# Patient Record
Sex: FEMALE | Race: WHITE | Employment: OTHER | URBAN - NONMETROPOLITAN AREA
[De-identification: names, ages, dates, MRNs, and addresses within clinical notes are randomized per-mention and may not be internally consistent; named-entity substitution may affect disease eponyms.]

---

## 2021-07-23 ENCOUNTER — HOSPITAL ENCOUNTER (INPATIENT)
Age: 71
LOS: 20 days | Discharge: NURSING, REHAB OR LONG-TERM CARE | DRG: 057 | End: 2021-08-12
Attending: PSYCHIATRY & NEUROLOGY | Admitting: PSYCHIATRY & NEUROLOGY
Payer: MEDICARE

## 2021-07-23 DIAGNOSIS — G24.01 TARDIVE DYSKINESIA: ICD-10-CM

## 2021-07-23 DIAGNOSIS — F41.1 GAD (GENERALIZED ANXIETY DISORDER): Primary | ICD-10-CM

## 2021-07-23 PROBLEM — G30.9 SEVERE MAJOR NEUROCOGNITIVE DISORDER DUE TO ALZHEIMER'S DISEASE WITH BEHAVIORAL DISTURBANCE (HCC): Status: ACTIVE | Noted: 2021-07-23

## 2021-07-23 PROBLEM — N32.81 OVERACTIVE BLADDER: Chronic | Status: ACTIVE | Noted: 2021-07-23

## 2021-07-23 PROBLEM — E87.6 HYPOKALEMIA: Status: ACTIVE | Noted: 2021-07-23

## 2021-07-23 PROBLEM — I10 ESSENTIAL HYPERTENSION: Chronic | Status: ACTIVE | Noted: 2021-07-23

## 2021-07-23 PROBLEM — E78.49 OTHER HYPERLIPIDEMIA: Chronic | Status: ACTIVE | Noted: 2021-07-23

## 2021-07-23 PROBLEM — F02.C18 SEVERE MAJOR NEUROCOGNITIVE DISORDER DUE TO ALZHEIMER'S DISEASE WITH BEHAVIORAL DISTURBANCE (HCC): Status: ACTIVE | Noted: 2021-07-23

## 2021-07-23 PROBLEM — E03.4 HYPOTHYROIDISM DUE TO ACQUIRED ATROPHY OF THYROID: Chronic | Status: ACTIVE | Noted: 2021-07-23

## 2021-07-23 PROCEDURE — 86592 SYPHILIS TEST NON-TREP QUAL: CPT

## 2021-07-23 PROCEDURE — 6370000000 HC RX 637 (ALT 250 FOR IP): Performed by: PHYSICIAN ASSISTANT

## 2021-07-23 PROCEDURE — 1240000000 HC EMOTIONAL WELLNESS R&B

## 2021-07-23 PROCEDURE — 84439 ASSAY OF FREE THYROXINE: CPT

## 2021-07-23 PROCEDURE — 6370000000 HC RX 637 (ALT 250 FOR IP): Performed by: PSYCHIATRY & NEUROLOGY

## 2021-07-23 PROCEDURE — 84443 ASSAY THYROID STIM HORMONE: CPT

## 2021-07-23 PROCEDURE — 36415 COLL VENOUS BLD VENIPUNCTURE: CPT

## 2021-07-23 RX ORDER — LORAZEPAM 2 MG/ML
1 INJECTION INTRAMUSCULAR
Status: ACTIVE | OUTPATIENT
Start: 2021-07-23 | End: 2021-07-23

## 2021-07-23 RX ORDER — OXYBUTYNIN CHLORIDE 5 MG/1
5 TABLET ORAL DAILY
Status: DISCONTINUED | OUTPATIENT
Start: 2021-07-24 | End: 2021-07-26 | Stop reason: ALTCHOICE

## 2021-07-23 RX ORDER — LEVOTHYROXINE SODIUM 0.03 MG/1
25 TABLET ORAL DAILY
Status: DISCONTINUED | OUTPATIENT
Start: 2021-07-24 | End: 2021-08-12 | Stop reason: HOSPADM

## 2021-07-23 RX ORDER — ACETAMINOPHEN 325 MG/1
650 TABLET ORAL EVERY 4 HOURS PRN
Status: DISCONTINUED | OUTPATIENT
Start: 2021-07-23 | End: 2021-08-12 | Stop reason: HOSPADM

## 2021-07-23 RX ORDER — AMLODIPINE BESYLATE 5 MG/1
5 TABLET ORAL DAILY
COMMUNITY
Start: 2021-07-23

## 2021-07-23 RX ORDER — SIMVASTATIN 40 MG
40 TABLET ORAL DAILY
COMMUNITY
Start: 2021-07-23

## 2021-07-23 RX ORDER — AMLODIPINE BESYLATE 5 MG/1
5 TABLET ORAL DAILY
Status: DISCONTINUED | OUTPATIENT
Start: 2021-07-24 | End: 2021-08-12 | Stop reason: HOSPADM

## 2021-07-23 RX ORDER — POTASSIUM CHLORIDE 750 MG/1
10 TABLET, FILM COATED, EXTENDED RELEASE ORAL DAILY
COMMUNITY
Start: 2021-07-23

## 2021-07-23 RX ORDER — LOSARTAN POTASSIUM 100 MG/1
100 TABLET ORAL ONCE
Status: COMPLETED | OUTPATIENT
Start: 2021-07-23 | End: 2021-07-23

## 2021-07-23 RX ORDER — ATORVASTATIN CALCIUM 20 MG/1
20 TABLET, FILM COATED ORAL DAILY
Status: DISCONTINUED | OUTPATIENT
Start: 2021-07-24 | End: 2021-08-12 | Stop reason: HOSPADM

## 2021-07-23 RX ORDER — VENLAFAXINE HYDROCHLORIDE 75 MG/1
75 CAPSULE, EXTENDED RELEASE ORAL DAILY
Status: ON HOLD | COMMUNITY
Start: 2021-07-23 | End: 2021-08-11 | Stop reason: HOSPADM

## 2021-07-23 RX ORDER — LOSARTAN POTASSIUM 100 MG/1
100 TABLET ORAL DAILY
Status: DISCONTINUED | OUTPATIENT
Start: 2021-07-24 | End: 2021-08-12 | Stop reason: HOSPADM

## 2021-07-23 RX ORDER — AMLODIPINE BESYLATE 5 MG/1
5 TABLET ORAL ONCE
Status: COMPLETED | OUTPATIENT
Start: 2021-07-23 | End: 2021-07-23

## 2021-07-23 RX ORDER — TRAZODONE HYDROCHLORIDE 50 MG/1
50 TABLET ORAL NIGHTLY PRN
Status: DISCONTINUED | OUTPATIENT
Start: 2021-07-23 | End: 2021-08-12 | Stop reason: HOSPADM

## 2021-07-23 RX ORDER — FENOFIBRATE 160 MG/1
160 TABLET ORAL DAILY
Status: ON HOLD | COMMUNITY
Start: 2021-07-23 | End: 2021-08-11 | Stop reason: HOSPADM

## 2021-07-23 RX ORDER — LORAZEPAM 1 MG/1
1 TABLET ORAL ONCE
Status: COMPLETED | OUTPATIENT
Start: 2021-07-23 | End: 2021-07-23

## 2021-07-23 RX ORDER — BENZTROPINE MESYLATE 1 MG/ML
2 INJECTION INTRAMUSCULAR; INTRAVENOUS 2 TIMES DAILY PRN
Status: DISCONTINUED | OUTPATIENT
Start: 2021-07-23 | End: 2021-08-12 | Stop reason: HOSPADM

## 2021-07-23 RX ORDER — HALOPERIDOL 5 MG/ML
5 INJECTION INTRAMUSCULAR EVERY 4 HOURS PRN
Status: DISCONTINUED | OUTPATIENT
Start: 2021-07-23 | End: 2021-08-12 | Stop reason: HOSPADM

## 2021-07-23 RX ORDER — OXYBUTYNIN CHLORIDE 5 MG/1
5 TABLET ORAL DAILY
COMMUNITY
Start: 2021-07-23

## 2021-07-23 RX ORDER — ACETAMINOPHEN 500 MG
500 TABLET ORAL EVERY 4 HOURS PRN
Status: DISCONTINUED | OUTPATIENT
Start: 2021-07-23 | End: 2021-08-12 | Stop reason: HOSPADM

## 2021-07-23 RX ORDER — HALOPERIDOL 5 MG
5 TABLET ORAL EVERY 4 HOURS PRN
Status: DISCONTINUED | OUTPATIENT
Start: 2021-07-23 | End: 2021-08-12 | Stop reason: HOSPADM

## 2021-07-23 RX ORDER — LEVOTHYROXINE SODIUM 0.03 MG/1
25 TABLET ORAL DAILY
COMMUNITY
Start: 2021-07-23

## 2021-07-23 RX ORDER — LORAZEPAM 2 MG/ML
1 INJECTION INTRAMUSCULAR EVERY 4 HOURS PRN
Status: DISCONTINUED | OUTPATIENT
Start: 2021-07-23 | End: 2021-08-12 | Stop reason: HOSPADM

## 2021-07-23 RX ORDER — CLONAZEPAM 1 MG/1
1 TABLET ORAL ONCE
Status: COMPLETED | OUTPATIENT
Start: 2021-07-23 | End: 2021-07-23

## 2021-07-23 RX ORDER — POTASSIUM CHLORIDE 750 MG/1
10 TABLET, EXTENDED RELEASE ORAL DAILY
Status: DISCONTINUED | OUTPATIENT
Start: 2021-07-24 | End: 2021-08-12 | Stop reason: HOSPADM

## 2021-07-23 RX ORDER — LOSARTAN POTASSIUM 100 MG/1
100 TABLET ORAL DAILY
COMMUNITY
Start: 2021-07-23

## 2021-07-23 RX ORDER — LORAZEPAM 1 MG/1
1 TABLET ORAL EVERY 4 HOURS PRN
Status: DISCONTINUED | OUTPATIENT
Start: 2021-07-23 | End: 2021-08-12 | Stop reason: HOSPADM

## 2021-07-23 RX ORDER — POLYETHYLENE GLYCOL 3350 17 G/17G
17 POWDER, FOR SOLUTION ORAL DAILY PRN
Status: DISCONTINUED | OUTPATIENT
Start: 2021-07-23 | End: 2021-08-12 | Stop reason: HOSPADM

## 2021-07-23 RX ADMIN — LORAZEPAM 1 MG: 1 TABLET ORAL at 22:14

## 2021-07-23 RX ADMIN — CLONAZEPAM 1 MG: 1 TABLET ORAL at 22:14

## 2021-07-23 RX ADMIN — LOSARTAN POTASSIUM 100 MG: 100 TABLET, FILM COATED ORAL at 22:14

## 2021-07-23 RX ADMIN — AMLODIPINE BESYLATE 5 MG: 5 TABLET ORAL at 22:14

## 2021-07-24 LAB
ALBUMIN SERPL-MCNC: 3.3 GM/DL (ref 3.4–5)
ALP BLD-CCNC: 65 IU/L (ref 40–129)
ALT SERPL-CCNC: 11 U/L (ref 10–40)
ANION GAP SERPL CALCULATED.3IONS-SCNC: 8 MMOL/L (ref 4–16)
AST SERPL-CCNC: 27 IU/L (ref 15–37)
BASOPHILS ABSOLUTE: 0 K/CU MM
BASOPHILS RELATIVE PERCENT: 0.3 % (ref 0–1)
BILIRUB SERPL-MCNC: 0.8 MG/DL (ref 0–1)
BUN BLDV-MCNC: 15 MG/DL (ref 6–23)
CALCIUM SERPL-MCNC: 9.6 MG/DL (ref 8.3–10.6)
CHLORIDE BLD-SCNC: 101 MMOL/L (ref 99–110)
CO2: 30 MMOL/L (ref 21–32)
CREAT SERPL-MCNC: 0.8 MG/DL (ref 0.6–1.1)
DIFFERENTIAL TYPE: ABNORMAL
EOSINOPHILS ABSOLUTE: 0.1 K/CU MM
EOSINOPHILS RELATIVE PERCENT: 1.6 % (ref 0–3)
ESTIMATED AVERAGE GLUCOSE: 103 MG/DL
GFR AFRICAN AMERICAN: >60 ML/MIN/1.73M2
GFR NON-AFRICAN AMERICAN: >60 ML/MIN/1.73M2
GLUCOSE BLD-MCNC: 79 MG/DL (ref 70–99)
HBA1C MFR BLD: 5.2 % (ref 4.2–6.3)
HCT VFR BLD CALC: 45.6 % (ref 37–47)
HEMOGLOBIN: 14.2 GM/DL (ref 12.5–16)
IMMATURE NEUTROPHIL %: 0.2 % (ref 0–0.43)
LYMPHOCYTES ABSOLUTE: 1.2 K/CU MM
LYMPHOCYTES RELATIVE PERCENT: 19.5 % (ref 24–44)
MCH RBC QN AUTO: 29 PG (ref 27–31)
MCHC RBC AUTO-ENTMCNC: 31.1 % (ref 32–36)
MCV RBC AUTO: 93.3 FL (ref 78–100)
MONOCYTES ABSOLUTE: 0.6 K/CU MM
MONOCYTES RELATIVE PERCENT: 8.9 % (ref 0–4)
PDW BLD-RTO: 13.1 % (ref 11.7–14.9)
PLATELET # BLD: 160 K/CU MM (ref 140–440)
PMV BLD AUTO: 12.6 FL (ref 7.5–11.1)
POTASSIUM SERPL-SCNC: 3.9 MMOL/L (ref 3.5–5.1)
RBC # BLD: 4.89 M/CU MM (ref 4.2–5.4)
SEGMENTED NEUTROPHILS ABSOLUTE COUNT: 4.4 K/CU MM
SEGMENTED NEUTROPHILS RELATIVE PERCENT: 69.5 % (ref 36–66)
SODIUM BLD-SCNC: 139 MMOL/L (ref 135–145)
T4 FREE: 1.2 NG/DL (ref 0.9–1.8)
TOTAL IMMATURE NEUTOROPHIL: 0.01 K/CU MM
TOTAL PROTEIN: 6.6 GM/DL (ref 6.4–8.2)
TSH HIGH SENSITIVITY: 0.91 UIU/ML (ref 0.27–4.2)
WBC # BLD: 6.4 K/CU MM (ref 4–10.5)

## 2021-07-24 PROCEDURE — 80053 COMPREHEN METABOLIC PANEL: CPT

## 2021-07-24 PROCEDURE — 83036 HEMOGLOBIN GLYCOSYLATED A1C: CPT

## 2021-07-24 PROCEDURE — 6370000000 HC RX 637 (ALT 250 FOR IP): Performed by: PHYSICIAN ASSISTANT

## 2021-07-24 PROCEDURE — 85025 COMPLETE CBC W/AUTO DIFF WBC: CPT

## 2021-07-24 PROCEDURE — 6370000000 HC RX 637 (ALT 250 FOR IP): Performed by: PSYCHIATRY & NEUROLOGY

## 2021-07-24 PROCEDURE — 99232 SBSQ HOSP IP/OBS MODERATE 35: CPT | Performed by: PSYCHIATRY & NEUROLOGY

## 2021-07-24 PROCEDURE — 6360000002 HC RX W HCPCS: Performed by: PSYCHIATRY & NEUROLOGY

## 2021-07-24 PROCEDURE — 93005 ELECTROCARDIOGRAM TRACING: CPT | Performed by: PSYCHIATRY & NEUROLOGY

## 2021-07-24 PROCEDURE — 36415 COLL VENOUS BLD VENIPUNCTURE: CPT

## 2021-07-24 PROCEDURE — 1240000000 HC EMOTIONAL WELLNESS R&B

## 2021-07-24 RX ORDER — DIVALPROEX SODIUM 500 MG/1
500 TABLET, EXTENDED RELEASE ORAL DAILY
Status: DISCONTINUED | OUTPATIENT
Start: 2021-07-24 | End: 2021-07-24

## 2021-07-24 RX ORDER — DIVALPROEX SODIUM 125 MG/1
500 CAPSULE, COATED PELLETS ORAL DAILY
Status: DISCONTINUED | OUTPATIENT
Start: 2021-07-25 | End: 2021-08-08

## 2021-07-24 RX ORDER — BENZTROPINE MESYLATE 1 MG/1
1 TABLET ORAL 2 TIMES DAILY
Status: DISCONTINUED | OUTPATIENT
Start: 2021-07-24 | End: 2021-08-12 | Stop reason: HOSPADM

## 2021-07-24 RX ADMIN — LOSARTAN POTASSIUM 100 MG: 100 TABLET, FILM COATED ORAL at 10:44

## 2021-07-24 RX ADMIN — TRAZODONE HYDROCHLORIDE 50 MG: 50 TABLET ORAL at 20:52

## 2021-07-24 RX ADMIN — CHOLECALCIFEROL TAB 125 MCG (5000 UNIT) 5000 UNITS: 125 TAB at 10:44

## 2021-07-24 RX ADMIN — ATORVASTATIN CALCIUM 20 MG: 20 TABLET, FILM COATED ORAL at 10:44

## 2021-07-24 RX ADMIN — DIVALPROEX SODIUM 500 MG: 500 TABLET, EXTENDED RELEASE ORAL at 16:07

## 2021-07-24 RX ADMIN — BENZTROPINE MESYLATE 1 MG: 1 TABLET ORAL at 20:52

## 2021-07-24 RX ADMIN — HALOPERIDOL 5 MG: 5 TABLET ORAL at 21:56

## 2021-07-24 RX ADMIN — OXYBUTYNIN CHLORIDE 5 MG: 5 TABLET ORAL at 10:44

## 2021-07-24 RX ADMIN — LEVOTHYROXINE SODIUM 25 MCG: 25 TABLET ORAL at 10:43

## 2021-07-24 RX ADMIN — AMLODIPINE BESYLATE 5 MG: 5 TABLET ORAL at 10:44

## 2021-07-24 RX ADMIN — BENZTROPINE MESYLATE 1 MG: 1 TABLET ORAL at 16:09

## 2021-07-24 RX ADMIN — POTASSIUM CHLORIDE 10 MEQ: 10 TABLET, EXTENDED RELEASE ORAL at 10:41

## 2021-07-24 NOTE — GROUP NOTE
Group Therapy Note    Date: 7/24/2021    Group Start Time: 3486  Group End Time: 1578    Number of Participants: 3/4    Type: Exercise/Recreation Group    Group Topic/Objective: Chair Exercises    Notes: Pt declined participation despite encouragement from nurse.     Discipline Responsible: Music Therapist-Board Certified    Electronically signed by PRETTY Delgado on 7/24/2021 at 4:06 PM

## 2021-07-24 NOTE — CONSULTS
Hospitalist Consult Note      Name:  Rufino Batista /Age/Sex: 1950  (80 y.o. female)   MRN & CSN:  [id number] & [id number] Admission Date/Time: 2021  6:11 PM   Location:  1048/1048-01 PCP: [facility] [place], operated by [facility] Day: 1    Assessment and Plan:   Rufino Batista is a 80 y.o.  female  who presents with hallucinations and aggressive behavior  Hospitalist Team consulted for: Medical Management of the patient's    Assessment  Principal Problem:    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 ([facility] [address]  Active Problems:    Hypokalemia    Hypothyroidism due to acquired atrophy of thyroid    Essential hypertension    Overactive bladder    Other hyperlipidemia  Resolved Problems:    * No resolved hospital problems. *        Plan:  1. Neurocognitive disorder/Alzheimer's disease: Managed by the psychiatrist  2. Hypokalemia: Given oral replacement in the hospital prior to transfer, will get periodic BMP and continue with oral replacement of 10 mEq daily  3. Hypothyroidism: Continue with medication  4. Essential hypertension: Patient had mild elevated hypertension upon admission, she was given her daily medication immediately, and we will continue with oral daily medication  5. Overactive bladder: Continue with daily medication  6. Hyperlipidemia: Continue with daily medication        Diet ADULT DIET;  Regular   DVT Prophylaxis [] Lovenox, []  Heparin, [] SCDs, [x] Ambulation   GI Prophylaxis [] PPI,  [] H2 Blocker,  [] Carafate,  [] Diet/Tube Feeds   Code Status Full Code   Disposition Patient requires continued admission due to hallucinations, aggressive behavior and neurocognitive decline/disturbance     History of Present Illness: 4 elements, Location/Symptom, Timing/Onset, Context/Setting, Quality, Duration, Modifying Factors, Severity     Chief Complaint: 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 [facility])  Maddie Macias is a 80 y.o.  female  who presents with acute behavior changes. The patient lives with a sister, they usually take care of her, according to the nurse who took report from the hospital for which she was transferred she has not taken any of her medicines for the past 2 weeks, and she has been hallucinating. She was admitted to [facility] as an altered mental status, she was aggressive at that time, she was eventually cleared and sent to our senior behavioral unit where she resides at this time. Review of Systems: (10 or more )   Review of Systems   Unable to perform ROS: Psychiatric disorder       Patient has altered mental status and psychiatric illness and there is no ability to obtain a review of systems or confirmed family history    No family history on file. Social History     Tobacco Use    Smoking status: Not on file   Substance Use Topics    Alcohol use: Not on file    Drug use: Not on file       (2-9 systems for level 4, 10 or more for level 5)  Objective:   No intake or output data in the 24 hours ending 07/23/21 2253     Vitals: There were no vitals filed for this visit. Physical Exam:    Physical Exam  Vitals and nursing note reviewed. Constitutional:       Appearance: Normal appearance. She is well-developed. She is not ill-appearing or diaphoretic. HENT:      Head: Normocephalic and atraumatic. Right Ear: External ear normal.      Left Ear: External ear normal.      Nose: Nose normal.   Eyes:      General:         Right eye: No discharge. Left eye: No discharge. Cardiovascular:      Rate and Rhythm: Normal rate and regular rhythm. Heart sounds: Normal heart sounds. No murmur heard. No friction rub. No gallop. Pulmonary:      Effort: Pulmonary effort is normal. No respiratory distress. Breath sounds: Normal breath sounds. No stridor. No wheezing or rales. Chest:      Chest wall: No tenderness.    Musculoskeletal: General: Normal range of motion. Cervical back: Normal range of motion and neck supple. Skin:     General: Skin is warm and dry. Coloration: Skin is not pale. Neurological:      General: No focal deficit present. Mental Status: She is alert and oriented to person, place, and time. GCS: GCS eye subscore is 4. GCS verbal subscore is 5. GCS motor subscore is 6. Motor: No weakness. Comments: Patient has tongue protrusions, and jerking movements consistent with tardive dyskinesia   Psychiatric:         Speech: She is noncommunicative. Behavior: Behavior is cooperative. Cognition and Memory: Cognition is impaired.       Comments: Patient responds yes or no to questions, occasionally says hello yes or hell no, is unable to provide any meaningful history             Medications:   Medications:    [START ON 7/24/2021] amLODIPine  5 mg Oral Daily    [START ON 7/24/2021] levothyroxine  25 mcg Oral Daily    [START ON 7/24/2021] losartan  100 mg Oral Daily    [START ON 7/24/2021] oxybutynin  5 mg Oral Daily    [START ON 7/24/2021] potassium chloride  10 mEq Oral Daily    [START ON 7/24/2021] vitamin D3  5,000 Units Oral Daily    [START ON 7/24/2021] atorvastatin  20 mg Oral Daily      Infusions:   PRN Meds: LORazepam, 1 mg, Once PRN  acetaminophen, 650 mg, Q4H PRN  acetaminophen, 500 mg, Q4H PRN  acetaminophen, 650 mg, Q4H PRN  polyethylene glycol, 17 g, Daily PRN  benztropine mesylate, 2 mg, BID PRN  LORazepam, 1 mg, Q4H PRN   Or  LORazepam, 1 mg, Q4H PRN  haloperidol, 5 mg, Q4H PRN   Or  haloperidol lactate, 5 mg, Q4H PRN  traZODone, 50 mg, Nightly PRN          Electronically signed by Tonia Odonnell PA-C on 7/23/2021 at 10:53 PM

## 2021-07-24 NOTE — H&P
Initial Psychiatric History and Physical    [facility]  [id number]  7/23/2021 07/24/21    ID: Patient is a 80 yrs y.o. female    CC: I am depressed      HPI: Pt is a 81 yo  female who presents for exacerbation of Neurocognitive D/O alzehimers type with behavioral disturbance. Pt noted recent exacarbation of mood and confusion with agitationf. Pt noted she currently feels safe and comfortable on the unit. Pt was in agreement with treatment team.  Pt was polite and cordial during the interview process. Pt noted she is doing [place] today. \"  Pt noted she is sleeping \"okay. [place] last night. \"  Pt noted her apptetite is reduced. Pt rated her depresssion a \"5,\" on a scale of zero to ten with ten being the worst and zero being none. Pt rated her anxiety a \"5,\" on the same scale. Pt noted passive thoughts to harm herself at this time. Pt denied any thoughts to harm anyone else. Pt denied any auditory or visiual hallucintations. 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      Alcohol: denies any current  Street drugs: denies any current  Tobacco: denies any current  Caffeine: 2-3 per day      Past Psychiatric History:   See note above    Family Psychiatric History:   See note above  Family Psychiatric History:   No family history on file. Allergies:   Allergies   Allergen Reactions    Penicillins Hives    Sulfa Antibiotics Other (See Comments)     Reaction unknown        OBJECTIVE  Vital Signs:  Vitals:    07/24/21 0727   BP: 123/79   Pulse: 76   Resp: 15   Temp: 98.1 °F (36.7 °C)   SpO2: 91%       Labs:  Recent Results (from the past 48 hour(s))   TSH without Reflex    Collection Time: 07/23/21 10:30 PM   Result Value Ref Range    TSH, High Sensitivity 0.910 0.270 - 4.20 uIu/ml   T4, free    Collection Time: 07/23/21 10:30 PM   Result Value Ref Range    T4 Free 1.20 0.9 - 1.8 NG/DL   EKG 12 lead    Collection Time: 07/24/21  5:14 AM   Result Value Ref Range    Ventricular Rate 65 BPM    Atrial Rate 65 BPM    P-R Interval 178 ms    QRS Duration 84 ms    Q-T Interval 452 ms    QTc Calculation (Bazett) 470 ms    P Axis 68 degrees    R Axis 23 degrees    T Axis 79 degrees    Diagnosis       Normal sinus rhythm  Normal ECG  No previous ECGs available     Comprehensive Metabolic Panel w/ Reflex to MG    Collection Time: 07/24/21  5:50 AM   Result Value Ref Range    Sodium 139 135 - 145 MMOL/L    Potassium 3.9 3.5 - 5.1 MMOL/L    Chloride 101 99 - 110 mMol/L    CO2 30 21 - 32 MMOL/L    BUN 15 6 - 23 MG/DL    CREATININE 0.8 0.6 - 1.1 MG/DL    Glucose 79 70 - 99 MG/DL    Calcium 9.6 8.3 - 10.6 MG/DL    Albumin 3.3 (L) 3.4 - 5.0 GM/DL    Total Protein 6.6 6.4 - 8.2 GM/DL    Total Bilirubin 0.8 0.0 - 1.0 MG/DL    ALT 11 10 - 40 U/L    AST 27 15 - 37 IU/L    Alkaline Phosphatase 65 40 - 129 IU/L    GFR Non-African American >60 >60 mL/min/1.73m2    GFR African American >60 >60 mL/min/1.73m2    Anion Gap 8 4 - 16   CBC auto differential    Collection Time: 07/24/21  5:50 AM   Result Value Ref Range    WBC 6.4 4.0 - 10.5 K/CU MM    RBC 4.89 4.2 - 5.4 M/CU MM    Hemoglobin 14.2 12.5 - 16.0 GM/DL    Hematocrit 45.6 37 - 47 %    MCV 93.3 78 - 100 FL    MCH 29.0 27 - 31 PG    MCHC 31.1 (L) 32.0 - 36.0 %    RDW 13.1 11.7 - 14.9 %    Platelets 120 800 - 439 K/CU MM    MPV 12.6 (H) 7.5 - 11.1 FL    Differential Type AUTOMATED DIFFERENTIAL     Segs Relative 69.5 (H) 36 - 66 %    Lymphocytes % 19.5 (L) 24 - 44 %    Monocytes % 8.9 (H) 0 - 4 %    Eosinophils % 1.6 0 - 3 %    Basophils % 0.3 0 - 1 %    Segs Absolute 4.4 K/CU MM    Lymphocytes Absolute 1.2 K/CU MM    Monocytes Absolute 0.6 K/CU MM    Eosinophils Absolute 0.1 K/CU MM    Basophils Absolute 0.0 K/CU MM    Immature Neutrophil % 0.2 0 - 0.43 %    Total Immature Neutrophil 0.01 K/CU MM   Hemoglobin A1c    Collection Time: 07/24/21  5:50 AM   Result Value Ref Range    Hemoglobin A1C 5.2 4.2 - 6.3 %    eAG 103 mg/dL       Review of Systems:  Reports of no current cardiovascular, respiratory, gastrointestinal, genitourinary, integumentary, neurological, muscuoskeletal, or immunological symptoms today. PSYCHIATRIC: See HPI above. Neurologic examination:  Mental status: The patient is alert, attentive, and oriented. Speech is clear and fluent with good repetition, comprehension, and naming. She recalls 3/3 objects at 5 minutes. ranial nerves:  CN II: Visual fields are full to confrontation. Fundoscopic exam is normal with sharp discs and no vascular changes. Venous pulsations are present bilaterally. Pupils are 4 mm and briskly reactive to light. Visual acuity is 20/20 bilaterally. CN III, IV, VI: At primary gaze, there is no eye deviation. CN V: Facial sensation is intact to pinprick in all 3 divisions bilaterally. Corneal responses are intact. CN VII: Face is symmetric with normal eye closure and smile. CN VII: Hearing is normal to rubbing fingers    CN IX, X: Palate elevates symmetrically. Phonation is normal.    CN XI: Head turning and shoulder shrug are intact    CN XII: Tongue is midline at times with TD based movements    Motor:  Chorea form type movements    Reflexes:  Reflexes are 2+ and symmetric at the biceps, triceps, knees, and ankles. Plantar responses are flexor. Sensory:  Light touch, pinprick, position sense, and vibration sense are intact in fingers and toes. Coordination:  Rapid alternating movements and fine finger movements are intact. There is no dysmetria on finger-to-nose and heel-knee-shin. There are no abnormal or extraneous movements.  Romberg is absent. Gait/Stance:  Posture is normal. Gait is steady with normal steps, base, arm swing, and turning. Heel and toe walking are normal. Tandem gait is normal when the patient closes one of her eyes. PSYCHIATRIC EXAMINATION / MENTAL STATUS EXAM    CONSTITUTIONAL:    Vitals:   Vitals:    07/24/21 0727   BP: 123/79   Pulse: 76   Resp: 15   Temp: 98.1 °F (36.7 °C)   SpO2: 91%      General appearance: [x] appears age, []  appears older than stated age,               [x]  adequately dressed and groomed, [] disheveled,               [x]  in no acute distress, [] appears mildly distressed, [] other           MUSCULOSKELETAL:   Gait:   [] normal, [] antalgic, [] unsteady, [] gait not evaluated   Station:             [] erect, [] sitting, [] recumbent, [] other        Strength/tone:  [x] muscle strength and tone appear consistent with age and                                        condition     [] atrophy      [] abnormal movements  PSYCHIATRIC:    Appearance: appears stated age. alert and oriented to person only. no acute distress. Adequate grooming and hygeine. Good eye contact. No prominent physical abnormalities. Attitude: Manner is cooperative and pleasant  Motor: Noted psychomotor agitation with chorea form movements and TD  Speech:limited due to TD; normal rate, volume, tone & amount. Language: intact understanding and production  Mood:nervious  Affect: mood congruent  Thought Production: Spontaneous. Thought Form: Noted tangentiality and circumstantiality with flight of ideas and loosening of associations. Thought Content/Perceptions: No NETTIE, no AVH, no delusion  Insight:questionable  Judgment: questonable  Memory: Immediate, recent, and remote appear impaired, though not formally tested.   Attention: maintained throughout interview  Fund of knowledge: Average  Gait/Balance: WNL/WNL           Impression:   Neurocognitive D/O Alzheimer type with behavioral disturbance  TD      Problem List:   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 ([facility] [address]   Tardive Dyskineasa   Jasper movements    Plan:  1. Admit to [facility]  2. Consult hospitalist to evaluate and treat medical conditions  3. Adjust psychotropic medications to target symptoms  4. Occupational Therapy, Physical Therapy, Group Psychotherapy as tolerated  5. Reviewed treatment plan with patient including medication risks, benefits, side effects. Obtained informed consent for treatment. 6. Anticipated length of stay 10-12 days  7. I certify that inpatient psychiatric hospital admission is medically necessary for treatment, which can be expected to improve the patient's condition, and/or for diagnostic study. 8. Psychiatric management:medication initiation and titration, group and individual therapy, safe and theraputic environment. 9. Status of problem/condition: ?Improving  10. Medical co-morbidities: [facility], appreciate assistance  11. Legal Status:Pending  12. The treatment team reviewed with the patient the diagnosis and treatment recommendations to include the risks, benefits, and side effects of chosen medications. 13. The patient verbalized understanding and agreed with the treatment regimen as outlined above. 14. The patient was encouraged to participate in groups. 15. Medical records, Labs, Diagnotic tests reviewed  16. q15 min safety checks for safety  17. Interval History. 18. Review current labs  19. Continue current medications- Start ingrezza for sever TD, [place] pharmacy \"refuse\" to help will coordinate when pharmacy opens tomorrow  20. Supportive Therapy Provided  21. Pt had an opportunity to ask questions and address concerns  22. Pt encouraged to continue therapy group or individual.  23. Pt was in agreement with treatment plan.   24. The risks benefits and side effects of medications were discussed with the patient, including alternatives and no treatment.           Electronically signed by Pranay Bob DO on 7/24/2021 at 1:49 PM

## 2021-07-24 NOTE — PROGRESS NOTES
Pt's sisters here to visit. Per sisters,  pt has never been , no children. Sisters would like for her to return home if possible. If not, [facility] in [facility] would be where they would like her placed. Pt's physician rounds there and she is comfortable with him.

## 2021-07-24 NOTE — PROGRESS NOTES
Pt's sisters came to visit today. Pt's sisters state Pt began crying intermittently in 1984, began tongue thrusting in January 2019, and began having hand movements within the last \"few months\". Pt's sisters state family practitioner was informed, but not concerned. Dr. Evan Fleming informed.

## 2021-07-24 NOTE — PROGRESS NOTES
Furman Schlatter arrived on the unit by stretcher at 2210 7/23/2021. She was making involuntary movements with her arms, face and tongue. She is not speaking very much but will answer questions with \"hell no\" or just nod her head. Her speech is hard to understand and she seems to be doing sign language with her hands. She has been refusing her medications at home so this RN crushed her pills and put them in pudding. Furman Schlatter did not want to eat the pudding but I told her it was the doctors orders and if she wanted to go home she needed to do what the doctor said. She ate all the pudding after that. She has been cooperative and not shown any aggressive behavior since she got to the unit. A clean gown and depends were put on her before bed and she slept through the night. Furman Schlatter woke up around 6am and had been incontinent of bowel and urine. She was crying in bed. We showered her, changed bed linens and shampooed her hair. Her hair had mats that could not be brushed out. This RN asked if she could cut them out and Furman Schlatter said \"cut them! \" She seemed to enjoy having her hair brushed and cut. She was extremely cooperative throughout the process and was not crying and upset anymore. Her speech was easier to understand this morning. This RN believes she oriented x1 or 2 is and is trying to communicate.

## 2021-07-24 NOTE — PROGRESS NOTES
Pt refusing to leave room to come down for dinner. Pt states that \"they\" are down there. This RN assured pt that the same pts that have been here all day are the ones eating but pt continues to refuse. Pt sitting in chair in room. Will continue to monitor.

## 2021-07-24 NOTE — PROGRESS NOTES
Unable to complete assessment d/t pt's disease progression. Pt has nonsensical and unintelligible speech.     Electronically signed by PRETTY Vega on 7/24/2021 at 11:32 AM

## 2021-07-24 NOTE — PROGRESS NOTES
Behavioral Services  Medicare Certification Upon Admission    I certify that this patient's inpatient psychiatric hospital admission is medically necessary for:    [x] (1) Treatment which could reasonably be expected to improve this patient's condition,       [x] (2) Or for diagnostic study;     AND     [x](2) The inpatient psychiatric services are provided while the individual is under the care of a physician and are included in the individualized plan of care.     Estimated length of stay/service 7 days    Plan for post-hospital care out pt follow up mental health    Electronically signed by Shantelle Brothers DO on 7/23/2021 at 10:25 PM

## 2021-07-24 NOTE — PROGRESS NOTES
Pt has constant tongue thrusting to the point where most of Pt's speech is unintelligible. This able to definitively deny SI/HI. Pt sometimes answers \"yes\" and shakes head up and down when asked about hallucinations and sometimes answers \"no\" while shaking head from side to side when asked about hallucinations. Pt noted throughout day to be talking loudly while sitting alone, though Pt's speech unintelligible. Pt would progressively get louder and would eventually start banging fists on the table. Pt very responsive to being asked to stop banging fist and would then go back to quietly talking to herself again. Pt often attempts to whisper and mouth words though it is impossible to ascertain what Pt is trying to convey - Pt continues to do so despite being asked to speak up as Pt cannot be understood. Pt ate 100% of breakfast and lunch. Pt refusing to eat dinner. Pt in room at times talking to self and yelling to self. Pt states, \"There are different people out there than before, \" and refuses to come out for dinner. Despite reassurance and encouragement, Pt remains in room. Pt threw empty cup across room one of the times Pt's room was visited encouraging Pt to come to dinner. Pt given space to self-calm. Pt currently in room talking quietly to self. Pt safety maintained. Continue to monitor.

## 2021-07-25 LAB
EKG ATRIAL RATE: 65 BPM
EKG DIAGNOSIS: NORMAL
EKG P AXIS: 68 DEGREES
EKG P-R INTERVAL: 178 MS
EKG Q-T INTERVAL: 452 MS
EKG QRS DURATION: 84 MS
EKG QTC CALCULATION (BAZETT): 470 MS
EKG R AXIS: 23 DEGREES
EKG T AXIS: 79 DEGREES
EKG VENTRICULAR RATE: 65 BPM

## 2021-07-25 PROCEDURE — 99232 SBSQ HOSP IP/OBS MODERATE 35: CPT | Performed by: PSYCHIATRY & NEUROLOGY

## 2021-07-25 PROCEDURE — 36415 COLL VENOUS BLD VENIPUNCTURE: CPT

## 2021-07-25 PROCEDURE — 6370000000 HC RX 637 (ALT 250 FOR IP): Performed by: PHYSICIAN ASSISTANT

## 2021-07-25 PROCEDURE — 86592 SYPHILIS TEST NON-TREP QUAL: CPT

## 2021-07-25 PROCEDURE — 6370000000 HC RX 637 (ALT 250 FOR IP): Performed by: PSYCHIATRY & NEUROLOGY

## 2021-07-25 PROCEDURE — 1240000000 HC EMOTIONAL WELLNESS R&B

## 2021-07-25 PROCEDURE — 93010 ELECTROCARDIOGRAM REPORT: CPT | Performed by: INTERNAL MEDICINE

## 2021-07-25 RX ORDER — OLANZAPINE 5 MG/1
5 TABLET ORAL NIGHTLY
Status: DISCONTINUED | OUTPATIENT
Start: 2021-07-25 | End: 2021-08-04

## 2021-07-25 RX ADMIN — LOSARTAN POTASSIUM 100 MG: 100 TABLET, FILM COATED ORAL at 07:47

## 2021-07-25 RX ADMIN — TRAZODONE HYDROCHLORIDE 50 MG: 50 TABLET ORAL at 20:43

## 2021-07-25 RX ADMIN — CHOLECALCIFEROL TAB 125 MCG (5000 UNIT) 5000 UNITS: 125 TAB at 07:45

## 2021-07-25 RX ADMIN — AMLODIPINE BESYLATE 5 MG: 5 TABLET ORAL at 07:47

## 2021-07-25 RX ADMIN — POTASSIUM CHLORIDE 10 MEQ: 10 TABLET, EXTENDED RELEASE ORAL at 07:45

## 2021-07-25 RX ADMIN — OXYBUTYNIN CHLORIDE 5 MG: 5 TABLET ORAL at 07:45

## 2021-07-25 RX ADMIN — BENZTROPINE MESYLATE 1 MG: 1 TABLET ORAL at 07:45

## 2021-07-25 RX ADMIN — LEVOTHYROXINE SODIUM 25 MCG: 25 TABLET ORAL at 07:44

## 2021-07-25 RX ADMIN — OLANZAPINE 5 MG: 5 TABLET, FILM COATED ORAL at 20:43

## 2021-07-25 RX ADMIN — ATORVASTATIN CALCIUM 20 MG: 20 TABLET, FILM COATED ORAL at 07:44

## 2021-07-25 RX ADMIN — DIVALPROEX SODIUM 500 MG: 125 CAPSULE ORAL at 07:44

## 2021-07-25 RX ADMIN — BENZTROPINE MESYLATE 1 MG: 1 TABLET ORAL at 20:42

## 2021-07-25 ASSESSMENT — PAIN SCALES - GENERAL
PAINLEVEL_OUTOF10: 0

## 2021-07-25 NOTE — GROUP NOTE
Group Therapy Note    Date: 7/25/2021    Group Start Time: 0830  Group End Time: 0900    Number of Participants: 3/5    Type: Morning Goals Group/ Community Meeting    Group Topic/Objective: Set Goal For The Day and to review Unit Rules and Regulations. Patient's Goal: Unintelligible    Notes:  Pt's vocalizations were largely unintelligible. Pt responded vocally to every question asked, however, only one vocalization was understood. When asked if pt was feeling depressed, pt vocalized \"oh hell no. \" Pt was unable to rate on a scale.      Depression (0-10): See above    Anxiety (0-10): Unintelligible     Irritability/Aggitation (0-10): Unintelligible     Status After Intervention:  Unchanged    Participation Level: Pt vocalized in response to questions    Participation Quality: Unintelligible    Speech:  Unintelligible     Thought Process/Content: Unable to report d/t unintelligible speech    Affective Functioning: Flat    Mood: Flat    Level of consciousness:  Alert    Response to Learning: Pt did respond vocally to all questions asked    Endings: None Reported    Modes of Intervention: Education, Support and Socialization    Discipline Responsible: Music Therapist-Board Certified    Electronically signed by PRETTY Lara on 7/25/2021 at 9:02 AM

## 2021-07-25 NOTE — PROGRESS NOTES
Pt appears calmer this shift as compared to yesterday's day shift. Pt noted to be intermittently talking quietly to self sitting in DR. Pt remains difficult to understand. Occasionally Pt will speak clearly. Pt eating 100% of meals. Pt denies SI/HI/AH/CH/VH. Pt receptive to encouragement to stay in milieu and participate. Pt had one brief episode of yelling out and and crying. Pt stated, \"Someone is trying to kill me. \" When asked who Pt believes is trying to kill Pt, Pt responded, \"My dad  on . \" Pt then began giving staff the middle finger. Pt currently chatting quietly to self in DR. Pt safety maintained. Continue to monitor.

## 2021-07-25 NOTE — PLAN OF CARE
Problem: Skin Integrity:  Goal: Will show no infection signs and symptoms  Description: Will show no infection signs and symptoms  7/24/2021 2212 by Meliza Aburto RN  Outcome: Ongoing  7/24/2021 1739 by Taisha Collazo RN  Outcome: Ongoing  Goal: Absence of new skin breakdown  Description: Absence of new skin breakdown  7/24/2021 2212 by Meliza Aburto RN  Outcome: Ongoing  7/24/2021 1739 by Taisha Collazo RN  Outcome: Ongoing     Problem: Altered Mood, Depressive Behavior:  Goal: Absence of self-harm  Description: Absence of self-harm  7/24/2021 2212 by Meliza Aburto RN  Outcome: Ongoing  7/24/2021 1739 by Taisha Collazo RN  Outcome: Ongoing  Goal: Patient specific goal  Description: Patient specific goal  7/24/2021 2212 by Meliza Aburto RN  Outcome: Ongoing  7/24/2021 1739 by Taisha Collazo RN  Outcome: Ongoing     Problem: Altered Mood, Deterioration in Function:  Goal: Ability to perform activities of daily living will improve  Description: Ability to perform activities of daily living will improve  7/24/2021 2212 by Meliza Aburto RN  Outcome: Ongoing  7/24/2021 1739 by Taisha Collazo RN  Outcome: Ongoing  Goal: Able to verbalize reality based thinking  Description: Able to verbalize reality based thinking  7/24/2021 2212 by Meliza Aburto RN  Outcome: Ongoing  7/24/2021 1739 by Taisha Collazo RN  Outcome: Ongoing  Goal: Skin appearance normal  Description: Skin appearance normal  7/24/2021 2212 by Meliza Aburto RN  Outcome: Ongoing  7/24/2021 1739 by Taisha Collaoz RN  Outcome: Ongoing  Goal: Maintenance of adequate nutrition will improve  Description: Maintenance of adequate nutrition will improve  7/24/2021 2212 by Meliza Aburto RN  Outcome: Ongoing  7/24/2021 1739 by Taisha Collazo RN  Outcome: Ongoing  Goal: Ability to tolerate increased activity will improve  Description: Ability to tolerate increased activity will improve  7/24/2021 2212 by Meliza Aburto RN  Outcome: Ongoing  7/24/2021 1739 by Antonio Méndez RN  Outcome: Ongoing  Goal: Participates in care planning  Description: Participates in care planning  7/24/2021 2212 by Soledad Eisenberg RN  Outcome: Ongoing  7/24/2021 1739 by Antonio Méndez RN  Outcome: Ongoing  Goal: Patient specific goal  Description: Patient specific goal  7/24/2021 2212 by Soledad Eisenberg RN  Outcome: Ongoing  7/24/2021 1739 by Antonio Méndez RN  Outcome: Ongoing     Problem: Falls - Risk of:  Goal: Will remain free from falls  Description: Will remain free from falls  7/24/2021 2212 by Soledad Eisenberg RN  Outcome: Ongoing  7/24/2021 1739 by Antonio Méndez RN  Outcome: Ongoing  Goal: Absence of physical injury  Description: Absence of physical injury  7/24/2021 2212 by Soledad Eisenberg RN  Outcome: Ongoing  7/24/2021 1739 by Antonio Méndez RN  Outcome: Ongoing     Problem: Confusion - Acute:  Goal: Absence of continued neurological deterioration signs and symptoms  Description: Absence of continued neurological deterioration signs and symptoms  7/24/2021 2212 by Soledad Eisenberg RN  Outcome: Ongoing  7/24/2021 1739 by Antonio Méndez RN  Outcome: Ongoing  Goal: Mental status will be restored to baseline  Description: Mental status will be restored to baseline  7/24/2021 2212 by Soledad Eisenberg RN  Outcome: Ongoing  7/24/2021 1739 by Antonio Méndez RN  Outcome: Ongoing     Problem: Discharge Planning:  Goal: Ability to perform activities of daily living will improve  Description: Ability to perform activities of daily living will improve  7/24/2021 2212 by Soledad Eisenberg RN  Outcome: Ongoing  7/24/2021 1739 by Antonio Méndez RN  Outcome: Ongoing  Goal: Participates in care planning  Description: Participates in care planning  7/24/2021 2212 by Soledad Eisenberg RN  Outcome: Ongoing  7/24/2021 1739 by Antonio Méndez RN  Outcome: Ongoing     Problem: Injury - Risk of, Physical Injury:  Goal: Will remain free from falls  Description: Will remain free from falls  7/24/2021 2212 by Soledad Eisenberg RN  Outcome: Ongoing  7/24/2021 1739 by Taisha Collazo RN  Outcome: Ongoing  Goal: Absence of physical injury  Description: Absence of physical injury  7/24/2021 2212 by Meliza Aburto RN  Outcome: Ongoing  7/24/2021 1739 by Taisha Collazo RN  Outcome: Ongoing     Problem: Mood - Altered:  Goal: Mood stable  Description: Mood stable  7/24/2021 2212 by Meliza Aburto RN  Outcome: Ongoing  7/24/2021 1739 by Taisha Collazo RN  Outcome: Ongoing  Goal: Absence of abusive behavior  Description: Absence of abusive behavior  7/24/2021 2212 by Meliza Aburto RN  Outcome: Ongoing  7/24/2021 1739 by Taisha Collazo RN  Outcome: Ongoing  Goal: Verbalizations of feeling emotionally comfortable while being cared for will increase  Description: Verbalizations of feeling emotionally comfortable while being cared for will increase  7/24/2021 2212 by Meliza Aburto RN  Outcome: Ongoing  7/24/2021 1739 by Taisha Collazo RN  Outcome: Ongoing     Problem: Psychomotor Activity - Altered:  Goal: Absence of psychomotor disturbance signs and symptoms  Description: Absence of psychomotor disturbance signs and symptoms  7/24/2021 2212 by Meliza Aburto RN  Outcome: Ongoing  7/24/2021 1739 by Taisha Collazo RN  Outcome: Ongoing     Problem: Sensory Perception - Impaired:  Goal: Demonstrations of improved sensory functioning will increase  Description: Demonstrations of improved sensory functioning will increase  7/24/2021 2212 by Meliza Aburto RN  Outcome: Ongoing  7/24/2021 1739 by Taisha Collazo RN  Outcome: Ongoing  Goal: Decrease in sensory misperception frequency  Description: Decrease in sensory misperception frequency  7/24/2021 2212 by Meliza Aburto RN  Outcome: Ongoing  7/24/2021 1739 by Taisha Collazo RN  Outcome: Ongoing  Goal: Able to refrain from responding to false sensory perceptions  Description: Able to refrain from responding to false sensory perceptions  7/24/2021 2212 by Meliza Aburto RN  Outcome: Ongoing  7/24/2021 1739

## 2021-07-25 NOTE — GROUP NOTE
Group Therapy Note    Date: 2021    Group Start Time:   Group End Time:     Number of Participants:     Notes: Pt was present during group. When pt was asked a question, pt vocalized unintelligibly. Pt ceased vocalizations during ~50% of live music provided by the [facility]. In between songs, pt began to shout. MT-BC asked her what was wrong. Pt vocalized \"someone is trying to kill me,\" and pt became tearful. MT-BC asked her who was trying to hurt her, and pt shouted \"my dad  on !!!\" and stuck her middle finger on both hands up and waved her arms around for ~30 seconds. Then pt was quiet in response to next song. Music Genre(s) Used: [place] [employer]    Status After Intervention:  Unchanged    Participation Level:  Active Listener    Participation Quality: Vocalizations were all unintelligible     Speech:  Unintelligible    Thought Process/Content: Pt's vocalizations were all unintelligible     Affective Functioning: Labile    Mood: Labile    Level of consciousness:  Alert    Response to Learning: unintelligible vocalizations    Endings: None Reported    Modes of Intervention: Support, Socialization and Music    Discipline Responsible: Music Therapist-Board Certified    Electronically signed by PRETTY Valero on 2021 at 3:53 PM

## 2021-07-25 NOTE — PROGRESS NOTES
Psychiatric Progress Note    Osman Mena  [id number]  07/25/21    CHIEF COMPLAINT: I get upset    HPI: Osman Mena is a 81 yo  female who presents for exacerbation of Neurocognitive D/O alzehimers type with behavioral disturbance. Pt noted recent exacarbation of mood and confusion with agitationf. Pt noted she currently feels safe and comfortable on the unit. Pt was in agreement with treatment team.  Pt was polite and cordial during the interview process. 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 \"  Pt noted she is sleeping \"okay. Fawn Krystyna last night. \"  Pt noted her apptetite is reduced. Pt rated her depresssion a \"5,\" on a scale of zero to ten with ten being the worst and zero being none. Pt rated her anxiety a \"5,\" on the same scale. Pt noted passive thoughts to harm herself at this time. Pt denied any thoughts to harm anyone else.   Pt denied any auditory or visiual hallucintations.           Allergies   Allergen Reactions    Penicillins Hives    Sulfa Antibiotics Other (See Comments)     Reaction unknown       Medications Prior to Admission: amLODIPine (NORVASC) 5 MG tablet, Take 5 mg by mouth daily  vitamin D3 (CHOLECALCIFEROL) 125 MCG (5000 UT) TABS tablet, Take 50,000 Units by mouth daily  fenofibrate (TRIGLIDE) 160 MG tablet, Take 160 mg by mouth daily  levothyroxine (SYNTHROID) 25 MCG tablet, Take 25 mcg by mouth daily  losartan (COZAAR) 100 MG tablet, Take 100 mg by mouth daily  oxybutynin (DITROPAN) 5 MG tablet, Take 5 mg by mouth daily  potassium chloride (KLOR-CON) 10 MEQ extended release tablet, Take 10 mEq by mouth daily  simvastatin (ZOCOR) 40 MG tablet, Take 40 mg by mouth daily  venlafaxine (EFFEXOR XR) 75 MG extended release capsule, Take 75 mg by mouth daily    Past Medical History:   Diagnosis Date    Essential hypertension     Hyperlipidemia         Patient Active Problem List Diagnosis    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 ([address]    Hypothyroidism due to acquired atrophy of thyroid    Essential hypertension    Overactive bladder    Hypokalemia    Other hyperlipidemia       Review of Systems    OBJECTIVE  Vital Signs:  Vitals:    07/25/21 0740   BP: 130/78   Pulse: 60   Resp: 20   Temp: 97.7 °F (36.5 °C)   SpO2: 98%       Labs:  Recent Results (from the past 48 hour(s))   TSH without Reflex    Collection Time: 07/23/21 10:30 PM   Result Value Ref Range    TSH, High Sensitivity 0.910 0.270 - 4.20 uIu/ml   T4, free    Collection Time: 07/23/21 10:30 PM   Result Value Ref Range    T4 Free 1.20 0.9 - 1.8 NG/DL   EKG 12 lead    Collection Time: 07/24/21  5:14 AM   Result Value Ref Range    Ventricular Rate 65 BPM    Atrial Rate 65 BPM    P-R Interval 178 ms    QRS Duration 84 ms    Q-T Interval 452 ms    QTc Calculation (Bazett) 470 ms    P Axis 68 degrees    R Axis 23 degrees    T Axis 79 degrees    Diagnosis       Normal sinus rhythm  Normal ECG  No previous ECGs available     Comprehensive Metabolic Panel w/ Reflex to MG    Collection Time: 07/24/21  5:50 AM   Result Value Ref Range    Sodium 139 135 - 145 MMOL/L    Potassium 3.9 3.5 - 5.1 MMOL/L    Chloride 101 99 - 110 mMol/L    CO2 30 21 - 32 MMOL/L    BUN 15 6 - 23 MG/DL    CREATININE 0.8 0.6 - 1.1 MG/DL    Glucose 79 70 - 99 MG/DL    Calcium 9.6 8.3 - 10.6 MG/DL    Albumin 3.3 (L) 3.4 - 5.0 GM/DL    Total Protein 6.6 6.4 - 8.2 GM/DL    Total Bilirubin 0.8 0.0 - 1.0 MG/DL    ALT 11 10 - 40 U/L    AST 27 15 - 37 IU/L    Alkaline Phosphatase 65 40 - 129 IU/L    GFR Non-African American >60 >60 mL/min/1.73m2    GFR African American >60 >60 mL/min/1.73m2    Anion Gap 8 4 - 16   CBC auto differential    Collection Time: 07/24/21  5:50 AM   Result Value Ref Range    WBC 6.4 4.0 - 10.5 K/CU MM    RBC 4.89 4.2 - 5.4 M/CU MM    Hemoglobin 14.2 12.5 - 16.0 GM/DL    Hematocrit 45.6 37 - 47 %    MCV 93.3 78 - 100 FL    MCH 29.0 27 - 31 PG    MCHC 31.1 (L) 32.0 - 36.0 %    RDW 13.1 11.7 - 14.9 %    Platelets 572 390 - 746 K/CU MM    MPV 12.6 (H) 7.5 - 11.1 FL    Differential Type AUTOMATED DIFFERENTIAL     Segs Relative 69.5 (H) 36 - 66 %    Lymphocytes % 19.5 (L) 24 - 44 %    Monocytes % 8.9 (H) 0 - 4 %    Eosinophils % 1.6 0 - 3 %    Basophils % 0.3 0 - 1 %    Segs Absolute 4.4 K/CU MM    Lymphocytes Absolute 1.2 K/CU MM    Monocytes Absolute 0.6 K/CU MM    Eosinophils Absolute 0.1 K/CU MM    Basophils Absolute 0.0 K/CU MM    Immature Neutrophil % 0.2 0 - 0.43 %    Total Immature Neutrophil 0.01 K/CU MM   Hemoglobin A1c    Collection Time: 07/24/21  5:50 AM   Result Value Ref Range    Hemoglobin A1C 5.2 4.2 - 6.3 %    eAG 103 mg/dL       PSYCHIATRIC ASSESSMENT / MENTAL STATUS EXAM:   Vitals: Blood pressure 130/78, pulse 60, temperature 97.7 °F (36.5 °C), temperature source Temporal, resp. rate 20, height 5' 2\" (1.575 m), weight 142 lb (64.4 kg), SpO2 98 %. CONSTITUTIONAL:    Appearance: appears stated age. alert and oriented to person, place. no acute distress. Adequate grooming and hygeine. Good eye contact. No prominent physical abnormalities. Attitude: Manner is cooperative and pleasant  Motor: No psychomotor agitation, retardation or abnormal movements noted  Speech: Clearly articulated; normal rate, volume, tone & amount. Language: intact understanding and production  Mood: depressed  Affect: euthymic, full range, non-labile, congruent with mood and content of speech  Thought Production: Spontaneous. Thought Form: linear at times yet Noted tangentiality and circumstantiality with loosening of associations. Thought Content/Perceptions: No NETTIE, noted both AVH, noted delusion  Insight: poor  Judgment: poor  Memory: Immediate, recent, and remote appear impaired, though not formally tested.   Attention: maintained throughout interview  Fund of knowledge: Average  Gait/Balance: WNL/WNL

## 2021-07-25 NOTE — PROGRESS NOTES
Obdulio Sol isolated to her room all evening. She denied hallucinations but seemed to be having a conversation in her room by herself. She is constantly talking but is very hard to understand, however once in a while she says something very clearly. She is steadier on her feet this evening than she was yesterday. She gets up to go to the bathroom by herself, but is incontinent during the night. She got trazadone and haldol PRN and slept most of the night. She was cooperative and took her medications before bed.

## 2021-07-26 LAB — RPR: NON REACTIVE

## 2021-07-26 PROCEDURE — 1240000000 HC EMOTIONAL WELLNESS R&B

## 2021-07-26 PROCEDURE — 6370000000 HC RX 637 (ALT 250 FOR IP): Performed by: PHYSICIAN ASSISTANT

## 2021-07-26 PROCEDURE — 99232 SBSQ HOSP IP/OBS MODERATE 35: CPT | Performed by: NURSE PRACTITIONER

## 2021-07-26 PROCEDURE — 6370000000 HC RX 637 (ALT 250 FOR IP): Performed by: PSYCHIATRY & NEUROLOGY

## 2021-07-26 RX ORDER — OXYBUTYNIN CHLORIDE 5 MG/1
5 TABLET, EXTENDED RELEASE ORAL DAILY
Status: DISCONTINUED | OUTPATIENT
Start: 2021-07-26 | End: 2021-08-12 | Stop reason: HOSPADM

## 2021-07-26 RX ADMIN — OXYBUTYNIN CHLORIDE 5 MG: 5 TABLET, EXTENDED RELEASE ORAL at 07:49

## 2021-07-26 RX ADMIN — DIVALPROEX SODIUM 500 MG: 125 CAPSULE ORAL at 07:48

## 2021-07-26 RX ADMIN — LEVOTHYROXINE SODIUM 25 MCG: 25 TABLET ORAL at 06:28

## 2021-07-26 RX ADMIN — AMLODIPINE BESYLATE 5 MG: 5 TABLET ORAL at 08:50

## 2021-07-26 RX ADMIN — BENZTROPINE MESYLATE 1 MG: 1 TABLET ORAL at 07:49

## 2021-07-26 RX ADMIN — BENZTROPINE MESYLATE 1 MG: 1 TABLET ORAL at 20:59

## 2021-07-26 RX ADMIN — ATORVASTATIN CALCIUM 20 MG: 20 TABLET, FILM COATED ORAL at 07:49

## 2021-07-26 RX ADMIN — CHOLECALCIFEROL TAB 125 MCG (5000 UNIT) 5000 UNITS: 125 TAB at 07:49

## 2021-07-26 RX ADMIN — TRAZODONE HYDROCHLORIDE 50 MG: 50 TABLET ORAL at 20:59

## 2021-07-26 RX ADMIN — POTASSIUM CHLORIDE 10 MEQ: 10 TABLET, EXTENDED RELEASE ORAL at 07:49

## 2021-07-26 RX ADMIN — OLANZAPINE 5 MG: 5 TABLET, FILM COATED ORAL at 20:59

## 2021-07-26 RX ADMIN — LOSARTAN POTASSIUM 100 MG: 100 TABLET, FILM COATED ORAL at 07:48

## 2021-07-26 ASSESSMENT — PAIN SCALES - GENERAL: PAINLEVEL_OUTOF10: 0

## 2021-07-26 NOTE — PROGRESS NOTES
Psychiatric Progress Note    Sheridan Currie  [id number]  07/26/21    CHIEF COMPLAINT: I get upset    HPI: Sheridan Currie is a 81 yo  female who presents for exacerbation of Neurocognitive D/O alzehimers type with behavioral disturbance. Pt noted recent exacarbation of mood and confusion with agitationf. Pt noted she currently feels safe and comfortable on the unit. Pt was in agreement with treatment team.  Pt was polite and cordial during the interview process. 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 \"  Pt noted she is sleeping \"okay. Benedetta Ou \" Per staff she slept 9.5 hours. Pt noted her apptetite is reduced but she was enthusiastically eating today enjoying the meal.  Pt rated her depresssion a \"5,\" on a scale of zero to ten with ten being the worst and zero being none. Pt rated her anxiety a \"5,\" on the same scale. Pt noted passive thoughts to harm herself at this time. Pt denied any thoughts to harm anyone else. Pt denied any auditory or visiual hallucintations.   She was more communicative today but it is hard to understand due to forceful and numerous tongue thrusting from TD.         Allergies   Allergen Reactions    Penicillins Hives    Sulfa Antibiotics Other (See Comments)     Reaction unknown       Medications Prior to Admission: amLODIPine (NORVASC) 5 MG tablet, Take 5 mg by mouth daily  vitamin D3 (CHOLECALCIFEROL) 125 MCG (5000 UT) TABS tablet, Take 50,000 Units by mouth daily  fenofibrate (TRIGLIDE) 160 MG tablet, Take 160 mg by mouth daily  levothyroxine (SYNTHROID) 25 MCG tablet, Take 25 mcg by mouth daily  losartan (COZAAR) 100 MG tablet, Take 100 mg by mouth daily  oxybutynin (DITROPAN) 5 MG tablet, Take 5 mg by mouth daily  potassium chloride (KLOR-CON) 10 MEQ extended release tablet, Take 10 mEq by mouth daily  simvastatin (ZOCOR) 40 MG tablet, Take 40 mg by mouth daily  venlafaxine (EFFEXOR XR) 75 MG extended release capsule, Take 75 mg by mouth daily    Past Medical History:   Diagnosis Date    Essential hypertension     Hyperlipidemia         Patient Active Problem List   Diagnosis    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 PSYCHIATRIC ASSESSMENT / MENTAL STATUS EXAM:   Vitals: Blood pressure (!) 142/67, pulse 77, temperature 98.7 °F (37.1 °C), temperature source Temporal, resp. rate 20, height 5' 2\" (1.575 m), weight 142 lb (64.4 kg), SpO2 99 %. CONSTITUTIONAL:    Appearance: appears stated age. alert and oriented to person, place. no acute distress. Adequate grooming and hygeine. Good eye contact. No prominent physical abnormalities. Attitude: Manner is cooperative and pleasant  Motor: No psychomotor agitation, retardation or abnormal movements noted  Speech: Clearly articulated; normal rate, volume, tone & amount. Language: intact understanding and production  Mood: depressed  Affect: euthymic, full range, non-labile, congruent with mood and content of speech  Thought Production: Spontaneous. Thought Form: linear at times yet Noted tangentiality and circumstantiality with loosening of associations. Thought Content/Perceptions: No NETTIE, noted both AVH, noted delusion  Insight: poor  Judgment: poor  Memory: Immediate, recent, and remote appear impaired, though not formally tested.   Attention: maintained throughout interview  Fund of knowledge: Average  Gait/Balance: WNL/WNL         IMPRESSION:   Neurocognitive D/O Alzheimer type with behavioral disturba    [place]

## 2021-07-26 NOTE — PROGRESS NOTES
Pt composed and socially withdrawn with labile affect. Pt receptive to sitting in milieu. Pt does not socialize with peers. When Pt asked if wants to participate in activites, Pt often answers loudly and clearly, \"Hell no! \" Pt remains difficult to understand. Pt able to enunciate some words clearly. Pt denies SI/HI/VH/thoughts of self-harm in conversation with this RN. Pt alternately endorses and then denies hallucinations in conversation with this RN. Pt sitting in milieu talking to self all day. Pt noted to start to yell and bang on table while talking to self. This RN asked Pt to stop and Pt stopped immediately. Pt one episode of tearfulness when assisted with washing hands. Pt refuses to go to BR and doesn't get up to go to BR independently. When Pt told, instead of asked to go to the BR, Pt got up and went to BR and urinated. No episodes of incontinence. Pt currently appears to be on phone with family. Pt was handed phone when family called to talk to Pt. Pt safety maintained. Continue to monitor.

## 2021-07-26 NOTE — PLAN OF CARE
[id number] Brooke Foreman  Initial Interdisciplinary Treatment Plan NOTE    Review Date & Time: 0900 7/26/2021    Admission Type:   Admission Type:  Involuntary    Reason for admission:  Reason for Admission: altered mental status    Patient Admission Diagnosis: Alzheimer's disease      PATIENT STRENGTHS:  Patient Strengths Strengths:  (DURAN)  Patient Strengths and Limitations:Limitations:  (DURAN)  Addictive Behavior:Addictive Behavior  In the past 3 months, have you felt or has someone told you that you have a problem with:  :  (DURAN)  Medical Problems:  Past Medical History:   Diagnosis Date    Essential hypertension     Hyperlipidemia        EDUCATION:   Learner Progress Toward Treatment Goals: Reviewed group plan and strategies and Reviewed goals and plan of care    Method: Individual    Outcome: Demonstrated Understanding    PATIENT GOALS: Discharge    PLAN/TREATMENT RECOMMENDATIONS UPDATE: Medication Adjustment    Estimated Length of Stay Update:  7 days  Estimated Discharge Date Update: 7/30/2021  Discharge Criteria: Meds Asjustment    SHORT-TERM GOALS UPDATE:   Time frame for Short-Term Goals: 7 day    LONG-TERM GOALS UPDATE:   Time frame for Long-Term Goals: 7 days  Members Present in Team Meeting: See Signature Sheet      RUBIN Joyner

## 2021-07-26 NOTE — PLAN OF CARE
Problem: Skin Integrity:  Goal: Will show no infection signs and symptoms  Description: Will show no infection signs and symptoms  7/25/2021 2232 by Ely Restrepo LPN  Outcome: Ongoing  7/25/2021 1536 by Bernardo Broderick RN  Outcome: Ongoing  7/25/2021 0939 by Tyshawn Baca RN  Outcome: Ongoing  Goal: Absence of new skin breakdown  Description: Absence of new skin breakdown  7/25/2021 2232 by Ely Restrepo LPN  Outcome: Ongoing  7/25/2021 1536 by Bernardo Broderick RN  Outcome: Ongoing  7/25/2021 0939 by Tyshawn Baca RN  Outcome: Ongoing     Problem: Altered Mood, Depressive Behavior:  Goal: Absence of self-harm  Description: Absence of self-harm  7/25/2021 2232 by Ely Restrepo LPN  Outcome: Ongoing  7/25/2021 1536 by Bernardo Broderick RN  Outcome: Ongoing  7/25/2021 0939 by Tyshawn Baca RN  Outcome: Ongoing  Goal: Patient specific goal  Description: Patient specific goal  7/25/2021 2232 by Ely Restrepo LPN  Outcome: Ongoing  7/25/2021 1536 by Bernardo Broderick RN  Outcome: Ongoing  7/25/2021 0939 by Tyshawn Baca RN  Outcome: Ongoing     Problem: Altered Mood, Deterioration in Function:  Goal: Ability to perform activities of daily living will improve  Description: Ability to perform activities of daily living will improve  7/25/2021 2232 by Ely Restrepo LPN  Outcome: Ongoing  7/25/2021 1536 by Bernardo Broderick RN  Outcome: Ongoing  7/25/2021 0939 by Tyshawn Baca RN  Outcome: Ongoing  Goal: Able to verbalize reality based thinking  Description: Able to verbalize reality based thinking  7/25/2021 2232 by Ely Restrepo LPN  Outcome: Ongoing  7/25/2021 1536 by Bernardo Broderick RN  Outcome: Ongoing  7/25/2021 0939 by Tyshawn Baca RN  Outcome: Ongoing  Goal: Skin appearance normal  Description: Skin appearance normal  7/25/2021 2232 by Ely Restrepo LPN  Outcome: Ongoing  7/25/2021 1536 by Bernardo Broderick RN  Outcome: Ongoing  7/25/2021 0939 by Tyshawn Baca RN  Outcome: Ongoing  Goal: Maintenance of adequate nutrition will improve  Description: Maintenance of adequate nutrition will improve  7/25/2021 2232 by Zenaida Jade LPN  Outcome: Ongoing  7/25/2021 1536 by Yasmin Rowe RN  Outcome: Ongoing  7/25/2021 0939 by Noah Eli RN  Outcome: Ongoing  Goal: Ability to tolerate increased activity will improve  Description: Ability to tolerate increased activity will improve  7/25/2021 2232 by Zenaida Jade LPN  Outcome: Ongoing  7/25/2021 1536 by Yasmin Rowe RN  Outcome: Ongoing  7/25/2021 0939 by Noah Eli RN  Outcome: Ongoing  Goal: Participates in care planning  Description: Participates in care planning  7/25/2021 2232 by Zenaida Jade LPN  Outcome: Ongoing  7/25/2021 1536 by Yasmin Rowe RN  Outcome: Ongoing  7/25/2021 0939 by Noah Eli RN  Outcome: Ongoing  Goal: Patient specific goal  Description: Patient specific goal  7/25/2021 2232 by Zenaida Jade LPN  Outcome: Ongoing  7/25/2021 1536 by Yasmin Rowe RN  Outcome: Ongoing  7/25/2021 0939 by Noah Eli RN  Outcome: Ongoing     Problem: Falls - Risk of:  Goal: Will remain free from falls  Description: Will remain free from falls  7/25/2021 1536 by Yasmin Rowe RN  Outcome: Ongoing  7/25/2021 0939 by Noah Eli RN  Outcome: Ongoing  Goal: Absence of physical injury  Description: Absence of physical injury  7/25/2021 1536 by Yasmin Rowe RN  Outcome: Ongoing  7/25/2021 0939 by Noah Eli RN  Outcome: Ongoing     Problem: Confusion - Acute:  Goal: Absence of continued neurological deterioration signs and symptoms  Description: Absence of continued neurological deterioration signs and symptoms  7/25/2021 1536 by Yasmin Rowe RN  Outcome: Ongoing  7/25/2021 0939 by Noah Eli RN  Outcome: Ongoing  Goal: Mental status will be restored to baseline  Description: Mental status will be restored to baseline  7/25/2021 1536 by Yasmin Rowe RN  Outcome: Ongoing  7/25/2021 0939 by Brunilda Stevenson RN  Outcome: Ongoing     Problem: Discharge Planning:  Goal: Ability to perform activities of daily living will improve  Description: Ability to perform activities of daily living will improve  7/25/2021 2232 by Kyra Carballo LPN  Outcome: Ongoing  7/25/2021 1536 by Antonio Méndez RN  Outcome: Ongoing  7/25/2021 0939 by Brunilda Stevenson RN  Outcome: Ongoing  Goal: Participates in care planning  Description: Participates in care planning  7/25/2021 1536 by Antonio Méndez RN  Outcome: Ongoing  7/25/2021 0939 by Brunilda Stevenson RN  Outcome: Ongoing     Problem: Injury - Risk of, Physical Injury:  Goal: Will remain free from falls  Description: Will remain free from falls  7/25/2021 1536 by Antonio Méndez RN  Outcome: Ongoing  7/25/2021 0939 by Brunilda Stevenson RN  Outcome: Ongoing  Goal: Absence of physical injury  Description: Absence of physical injury  7/25/2021 1536 by Antonio Méndez RN  Outcome: Ongoing  7/25/2021 0939 by Brunilda Stevenson RN  Outcome: Ongoing     Problem: Mood - Altered:  Goal: Mood stable  Description: Mood stable  7/25/2021 1536 by Antonio Méndez RN  Outcome: Ongoing  7/25/2021 0939 by Brunilda Stevenson RN  Outcome: Ongoing  Goal: Absence of abusive behavior  Description: Absence of abusive behavior  7/25/2021 1536 by Antonio Méndez RN  Outcome: Ongoing  7/25/2021 0939 by Brunilda Stevenson RN  Outcome: Ongoing  Goal: Verbalizations of feeling emotionally comfortable while being cared for will increase  Description: Verbalizations of feeling emotionally comfortable while being cared for will increase  7/25/2021 1536 by Antonio Méndez RN  Outcome: Ongoing  7/25/2021 0939 by Brunilda Stevenson RN  Outcome: Ongoing     Problem: Psychomotor Activity - Altered:  Goal: Absence of psychomotor disturbance signs and symptoms  Description: Absence of psychomotor disturbance signs and symptoms  7/25/2021 1536 by Antonio Méndez RN  Outcome: Ongoing  7/25/2021 0939 by Alejandra Rios RN  Outcome: Ongoing     Problem: Sensory Perception - Impaired:  Goal: Demonstrations of improved sensory functioning will increase  Description: Demonstrations of improved sensory functioning will increase  7/25/2021 1536 by Rodríguez Macdonald RN  Outcome: Ongoing  7/25/2021 0939 by Alejandra Rios RN  Outcome: Ongoing  Goal: Decrease in sensory misperception frequency  Description: Decrease in sensory misperception frequency  7/25/2021 1536 by Rodríguez Macdonald RN  Outcome: Ongoing  7/25/2021 0939 by Alejandra Rios RN  Outcome: Ongoing  Goal: Able to refrain from responding to false sensory perceptions  Description: Able to refrain from responding to false sensory perceptions  7/25/2021 1536 by Rodríguez Macdonald RN  Outcome: Ongoing  7/25/2021 0939 by Alejandra Rios RN  Outcome: Ongoing  Goal: Demonstrates accurate environmental perceptions  Description: Demonstrates accurate environmental perceptions  7/25/2021 1536 by Rodríguez Macdonald RN  Outcome: Ongoing  7/25/2021 0939 by Alejandra Rios RN  Outcome: Ongoing  Goal: Able to distinguish between reality-based and nonreality-based thinking  Description: Able to distinguish between reality-based and nonreality-based thinking  7/25/2021 1536 by Rodríguez Macdonald RN  Outcome: Ongoing  7/25/2021 0939 by Alejandra Rios RN  Outcome: Ongoing  Goal: Able to interrupt nonreality-based thinking  Description: Able to interrupt nonreality-based thinking  7/25/2021 1536 by Rodríguez Macdonald RN  Outcome: Ongoing  7/25/2021 0939 by Alejandra Rios RN  Outcome: Ongoing     Problem: Sleep Pattern Disturbance:  Goal: Appears well-rested  Description: Appears well-rested  7/25/2021 1536 by Rodríguez Macdonald RN  Outcome: Ongoing  7/25/2021 0939 by Alejandra Rios RN  Outcome: Ongoing

## 2021-07-26 NOTE — PLAN OF CARE
[address] [place] [address]  Day 3 Interdisciplinary Treatment Plan NOTE    Review Date & Time: 0900 7/26/2021    Admission Type:   Admission Type: Involuntary    Reason for admission:  Reason for Admission: altered mental status    Patient Diagnosis:Alzheimer's disease    PATIENT STRENGTHS:  Patient Strengths Strengths:  (DURAN)  Patient Strengths and Limitations:Limitations:  (DURAN)  Addictive Behavior:Addictive Behavior  In the past 3 months, have you felt or has someone told you that you have a problem with:  :  (DURAN)  Medical Problems:  Past Medical History:   Diagnosis Date    Essential hypertension     Hyperlipidemia        Risk:  Fall Risk Total: 76  Ermias Scale Ermias Scale Score: 20  BVC Total: 1      Status EXAM:   Status and Exam  Normal: No  Facial Expression: Sad  Affect: Incongruent  Level of Consciousness: Alert  Mood:Normal: No  Mood: Sad, Suspicious  Motor Activity:Normal: No  Motor Activity: Tics, Repetitive Acts  Interview Behavior: Cooperative  Preception: [place] to Person  Attention:Normal: No  Attention: Distractible  Thought Processes: Circumstantial  Thought Content:Normal: No  Thought Content: Poverty of Content  Hallucinations: None  Delusions: No  Delusions:  (DURAN)  Memory:Normal: No  Memory:  (DURAN)  Insight and Judgment: No  Insight and Judgment: Poor Judgment, Poor Insight  Present Suicidal Ideation: No  Present Homicidal Ideation: No    Daily Assessment Last Entry:   Daily Sleep (WDL): Exceptions to WDL  Patient Currently in Pain: No  Daily Nutrition (WDL): Within Defined Limits    Patient Monitoring:  Frequency of Checks: 4 times per hour, close    Psychiatric Symptoms:   Depression Symptoms  Depression Symptoms: Isolative  Anxiety Symptoms  Anxiety Symptoms: Generalized  Jane Symptoms  Jane Symptoms: Poor judgment     Psychosis Symptoms  Delusion Type: No problems reported or observed.     Suicide Risk CSSR-S:  1) Within the past month, have you wished you were dead or wished you Problem: Occupational Therapy Goal  Goal: Occupational Therapy Goal  Goals to be met by 4/30/19    Mod A UBD' (MET 5/6/19)  Instruct in sling wear (MET 5/6/19)  Instruct in RUE exercises (MET 5/6/19)  Min A LBD (MET 5/6/19)  Assess toilet use (MET 5/6/19)  Outcome: Outcome(s) achieved Date Met: 04/06/19  The patient reviewed RUE exercises and completed bed mobility SBA, sit><stand, ambulation without AD, and toilet transfer Supervision assist, UBD Mod A, doff/don sling Max A, LBD Min A, toilet hygiene SBA, with home discharge recommendations addressed. CAROLINA Damon 4/6/2019

## 2021-07-26 NOTE — GROUP NOTE
Group Therapy Note    Date: 7/26/2021    Group Start Time: 1030  Group End Time: 2166     Number of Participants: 3/5    Type: Exercise/Recreation Group    Group Topic/Objective: Chair Exercises    Notes:  Pt was present in group but did not participate. Pt was eating at the time. Pt did intermittently make eye contact with PRETTY. Status After Intervention:  Unchanged    Participation Level: None    Participation Quality: Pt did not participate in chair exercises    Speech:  Pt was feeding self and did not vocalize    Thought Process/Content: Unable to report; pt did not vocalize    Affective Functioning: Flat    Mood: Flat    Level of consciousness:  Alert    Response to Learning: Unable to report; pt was present but did not participate.     Endings: None Reported    Modes of Intervention: Support, Socialization and Movement    Discipline Responsible: Music Therapist-Board Certified    Electronically signed by PRETTY Lemons on 7/26/2021 at 10:53 AM

## 2021-07-26 NOTE — GROUP NOTE
Group Therapy Note    Date: 7/26/2021    Group Start Time: 8244  Group End Time: 1600  Group Topic: [address]    PRETTY Hernandez      Group Therapy Note    Attendees: 3/5    Notes: Pt was encouraged to engage in painting to promote leisure activity and socialization. Pt was present in day room and when asked to attend group, pt vocalized \"hell no! \"    Discipline Responsible: Music Therapist-Board Certified    Signature: Electronically signed by PRETTY Hernandez on 7/26/2021 at 4:08 PM

## 2021-07-26 NOTE — GROUP NOTE
Group Therapy Note    Date: 7/26/2021    Group Start Time: 0830  Group End Time: 0900    Number of Participants: 3/5    Type: Morning Goals Group/ Community Meeting    Group Topic/Objective: Set Goal For The Day and to review Unit Rules and Regulations. Notes: Pt was receiving pt care at time of group and did not participate.     Discipline Responsible: Music Therapist-Board Certified    Electronically signed by PRETTY Kaur on 7/26/2021 at 10:05 AM

## 2021-07-27 PROCEDURE — 6370000000 HC RX 637 (ALT 250 FOR IP): Performed by: PSYCHIATRY & NEUROLOGY

## 2021-07-27 PROCEDURE — 99232 SBSQ HOSP IP/OBS MODERATE 35: CPT | Performed by: PSYCHIATRY & NEUROLOGY

## 2021-07-27 PROCEDURE — 1240000000 HC EMOTIONAL WELLNESS R&B

## 2021-07-27 PROCEDURE — 6370000000 HC RX 637 (ALT 250 FOR IP): Performed by: PHYSICIAN ASSISTANT

## 2021-07-27 RX ADMIN — OLANZAPINE 5 MG: 5 TABLET, FILM COATED ORAL at 20:58

## 2021-07-27 RX ADMIN — AMLODIPINE BESYLATE 5 MG: 5 TABLET ORAL at 08:03

## 2021-07-27 RX ADMIN — BENZTROPINE MESYLATE 1 MG: 1 TABLET ORAL at 20:58

## 2021-07-27 RX ADMIN — LOSARTAN POTASSIUM 100 MG: 100 TABLET, FILM COATED ORAL at 08:02

## 2021-07-27 RX ADMIN — OXYBUTYNIN CHLORIDE 5 MG: 5 TABLET, EXTENDED RELEASE ORAL at 08:02

## 2021-07-27 RX ADMIN — DIVALPROEX SODIUM 500 MG: 125 CAPSULE ORAL at 08:04

## 2021-07-27 RX ADMIN — TRAZODONE HYDROCHLORIDE 50 MG: 50 TABLET ORAL at 20:58

## 2021-07-27 RX ADMIN — POTASSIUM CHLORIDE 10 MEQ: 10 TABLET, EXTENDED RELEASE ORAL at 08:03

## 2021-07-27 RX ADMIN — LEVOTHYROXINE SODIUM 25 MCG: 25 TABLET ORAL at 05:44

## 2021-07-27 RX ADMIN — BENZTROPINE MESYLATE 1 MG: 1 TABLET ORAL at 08:04

## 2021-07-27 RX ADMIN — ATORVASTATIN CALCIUM 20 MG: 20 TABLET, FILM COATED ORAL at 08:04

## 2021-07-27 RX ADMIN — CHOLECALCIFEROL TAB 125 MCG (5000 UNIT) 5000 UNITS: 125 TAB at 08:03

## 2021-07-27 ASSESSMENT — PAIN SCALES - GENERAL: PAINLEVEL_OUTOF10: 0

## 2021-07-27 NOTE — PLAN OF CARE
Problem: Skin Integrity:  Goal: Will show no infection signs and symptoms  Description: Will show no infection signs and symptoms  7/26/2021 2307 by Cristy Bland LPN  Outcome: Ongoing  7/26/2021 1631 by Peggi Heimlich, RN  Outcome: Ongoing  Goal: Absence of new skin breakdown  Description: Absence of new skin breakdown  7/26/2021 2307 by Cristy Bland LPN  Outcome: Ongoing  7/26/2021 1631 by Peggi Heimlich, RN  Outcome: Ongoing     Problem: Altered Mood, Depressive Behavior:  Goal: Absence of self-harm  Description: Absence of self-harm  7/26/2021 2307 by Cristy Bland LPN  Outcome: Ongoing  7/26/2021 1631 by Peggi Heimlich, RN  Outcome: Ongoing  Goal: Patient specific goal  Description: Patient specific goal  7/26/2021 2307 by Cristy Bland LPN  Outcome: Ongoing  7/26/2021 1631 by Peggi Heimlich, RN  Outcome: Ongoing     Problem: Altered Mood, Deterioration in Function:  Goal: Ability to perform activities of daily living will improve  Description: Ability to perform activities of daily living will improve  7/26/2021 2307 by Cristy Bland LPN  Outcome: Ongoing  7/26/2021 1631 by Peggi Heimlich, RN  Outcome: Ongoing  Goal: Able to verbalize reality based thinking  Description: Able to verbalize reality based thinking  7/26/2021 2307 by Cristy Bland LPN  Outcome: Ongoing  7/26/2021 1631 by Peggi Heimlich, RN  Outcome: Ongoing  Goal: Skin appearance normal  Description: Skin appearance normal  7/26/2021 2307 by Cristy Bland LPN  Outcome: Ongoing  7/26/2021 1631 by Peggi Heimlich, RN  Outcome: Ongoing  Goal: Maintenance of adequate nutrition will improve  Description: Maintenance of adequate nutrition will improve  7/26/2021 2307 by Cristy Bland LPN  Outcome: Ongoing  7/26/2021 1631 by Peggi Heimlich, RN  Outcome: Ongoing  Goal: Ability to tolerate increased activity will improve  Description: Ability to tolerate increased activity will improve  7/26/2021 2307 by Dulce Apodaca LPN  Outcome: Ongoing  7/26/2021 1631 by Taisha Collazo RN  Outcome: Ongoing  Goal: Participates in care planning  Description: Participates in care planning  7/26/2021 2307 by Dulce Apodaca LPN  Outcome: Ongoing  7/26/2021 1631 by Taisha Collazo RN  Outcome: Ongoing  Goal: Patient specific goal  Description: Patient specific goal  7/26/2021 2307 by Dulce Apodaca LPN  Outcome: Ongoing  7/26/2021 1631 by Taisha Collazo RN  Outcome: Ongoing     Problem: Falls - Risk of:  Goal: Will remain free from falls  Description: Will remain free from falls  7/26/2021 2307 by Dulce Apodaca LPN  Outcome: Ongoing  7/26/2021 1631 by Taisha Collazo RN  Outcome: Ongoing  Goal: Absence of physical injury  Description: Absence of physical injury  7/26/2021 2307 by Dulce Apodaca LPN  Outcome: Ongoing  7/26/2021 1631 by Taisha Collazo RN  Outcome: Ongoing     Problem: Confusion - Acute:  Goal: Absence of continued neurological deterioration signs and symptoms  Description: Absence of continued neurological deterioration signs and symptoms  7/26/2021 2307 by Dulce Apodaca LPN  Outcome: Ongoing  7/26/2021 1631 by Taisha Collazo RN  Outcome: Ongoing  Goal: Mental status will be restored to baseline  Description: Mental status will be restored to baseline  7/26/2021 2307 by Dulce Apodaca LPN  Outcome: Ongoing  7/26/2021 1631 by Taisha Collazo RN  Outcome: Ongoing     Problem: Discharge Planning:  Goal: Ability to perform activities of daily living will improve  Description: Ability to perform activities of daily living will improve  7/26/2021 2307 by Dulce Apodaca LPN  Outcome: Ongoing  7/26/2021 1631 by Taisha Collazo RN  Outcome: Ongoing  Goal: Participates in care planning  Description: Participates in care planning  7/26/2021 2307 by Dulce Apodaca LPN  Outcome: Ongoing  7/26/2021 1631 by Taisha Collazo RN  Outcome: Ongoing     Problem: Injury - Risk of, Physical Injury:  Goal: Will remain free from falls  Description: Will remain free from falls  7/26/2021 2307 by Norma Lomax LPN  Outcome: Ongoing  7/26/2021 1631 by Reid Bob RN  Outcome: Ongoing  Goal: Absence of physical injury  Description: Absence of physical injury  7/26/2021 2307 by Norma Lomax LPN  Outcome: Ongoing  7/26/2021 1631 by Reid Bob RN  Outcome: Ongoing     Problem: Mood - Altered:  Goal: Mood stable  Description: Mood stable  7/26/2021 2307 by Norma Lomax LPN  Outcome: Ongoing  7/26/2021 1631 by Reid Bob RN  Outcome: Ongoing  Goal: Absence of abusive behavior  Description: Absence of abusive behavior  7/26/2021 2307 by Norma Lomax LPN  Outcome: Ongoing  7/26/2021 1631 by Reid Bob RN  Outcome: Ongoing  Goal: Verbalizations of feeling emotionally comfortable while being cared for will increase  Description: Verbalizations of feeling emotionally comfortable while being cared for will increase  7/26/2021 2307 by Norma Lomax LPN  Outcome: Ongoing  7/26/2021 1631 by Reid Bob RN  Outcome: Ongoing     Problem: Psychomotor Activity - Altered:  Goal: Absence of psychomotor disturbance signs and symptoms  Description: Absence of psychomotor disturbance signs and symptoms  7/26/2021 2307 by Norma Lomax LPN  Outcome: Ongoing  7/26/2021 1631 by Reid Bob RN  Outcome: Ongoing     Problem: Sensory Perception - Impaired:  Goal: Demonstrations of improved sensory functioning will increase  Description: Demonstrations of improved sensory functioning will increase  7/26/2021 2307 by Norma Lomax LPN  Outcome: Ongoing  7/26/2021 1631 by Reid Bob RN  Outcome: Ongoing  Goal: Decrease in sensory misperception frequency  Description: Decrease in sensory misperception frequency  7/26/2021 2307 by Norma Lomax LPN  Outcome: Ongoing  7/26/2021 1631 by Reid Bob RN  Outcome: Ongoing  Goal: Able to refrain from responding to false sensory perceptions  Description: Able to refrain from responding to false sensory perceptions  7/26/2021 2307 by Milla Chandra LPN  Outcome: Ongoing  7/26/2021 1631 by Tahmina Dalton RN  Outcome: Ongoing  Goal: Demonstrates accurate environmental perceptions  Description: Demonstrates accurate environmental perceptions  7/26/2021 2307 by Milla Chandra LPN  Outcome: Ongoing  7/26/2021 1631 by Tahmina Dalton RN  Outcome: Ongoing  Goal: Able to distinguish between reality-based and nonreality-based thinking  Description: Able to distinguish between reality-based and nonreality-based thinking  7/26/2021 2307 by Milla Chandra LPN  Outcome: Ongoing  7/26/2021 1631 by Tahmina Dalton RN  Outcome: Ongoing  Goal: Able to interrupt nonreality-based thinking  Description: Able to interrupt nonreality-based thinking  7/26/2021 2307 by Milla Chandra LPN  Outcome: Ongoing  7/26/2021 1631 by Tahmina Dalton RN  Outcome: Ongoing     Problem: Sleep Pattern Disturbance:  Goal: Appears well-rested  Description: Appears well-rested  7/26/2021 2307 by Milla Chandra LPN  Outcome: Ongoing  7/26/2021 1631 by Tahmina Dalton RN  Outcome: Ongoing
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
## 2021-07-27 NOTE — PROGRESS NOTES
Psychiatric Progress Note    Rufino Batista  [id number]  07/27/21    CHIEF COMPLAINT: I get upset    HPI: Rufino Batista is a 79 yo  female who presents for exacerbation of Neurocognitive D/O alzehimers type with behavioral disturbance. Pt noted recent exacarbation of mood and confusion with agitationf. Pt noted she currently feels safe and comfortable on the unit. Pt was in agreement with treatment team.  Pt was polite and cordial during the interview process. 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 \"  Pt noted she is sleeping \"okay. Bobby Hilt last night. \"  Pt noted her apptetite is reduced. Pt rated her depresssion a \"5,\" on a scale of zero to ten with ten being the worst and zero being none. Pt rated her anxiety a \"5,\" on the same scale. Pt noted passive thoughts to harm herself at this time. Pt denied any thoughts to harm anyone else.   Pt denied any auditory or visiual hallucintations.           Allergies   Allergen Reactions    Penicillins Hives    Sulfa Antibiotics Other (See Comments)     Reaction unknown       Medications Prior to Admission: amLODIPine (NORVASC) 5 MG tablet, Take 5 mg by mouth daily  vitamin D3 (CHOLECALCIFEROL) 125 MCG (5000 UT) TABS tablet, Take 50,000 Units by mouth daily  fenofibrate (TRIGLIDE) 160 MG tablet, Take 160 mg by mouth daily  levothyroxine (SYNTHROID) 25 MCG tablet, Take 25 mcg by mouth daily  losartan (COZAAR) 100 MG tablet, Take 100 mg by mouth daily  oxybutynin (DITROPAN) 5 MG tablet, Take 5 mg by mouth daily  potassium chloride (KLOR-CON) 10 MEQ extended release tablet, Take 10 mEq by mouth daily  simvastatin (ZOCOR) 40 MG tablet, Take 40 mg by mouth daily  venlafaxine (EFFEXOR XR) 75 MG extended release capsule, Take 75 mg by mouth daily    Past Medical History:   Diagnosis Date    Essential hypertension     Hyperlipidemia         Patient Active Problem List Diagnosis    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 PSYCHIATRIC ASSESSMENT / MENTAL STATUS EXAM:   Vitals: Blood pressure (!) 111/55, pulse 71, temperature 98.3 °F (36.8 °C), temperature source Temporal, resp. rate 14, height 5' 2\" (1.575 m), weight 142 lb (64.4 kg), SpO2 93 %. CONSTITUTIONAL:    Appearance: appears stated age. alert and oriented to person, place. no acute distress. Adequate grooming and hygeine. Good eye contact. No prominent physical abnormalities. Attitude: Manner is cooperative and pleasant  Motor: No psychomotor agitation, retardation or abnormal movements noted  Speech: Clearly articulated; normal rate, volume, tone & amount. Language: intact understanding and production  Mood: depressed  Affect: euthymic, full range, non-labile, congruent with mood and content of speech  Thought Production: Spontaneous. Thought Form: linear at times yet Noted tangentiality and circumstantiality with loosening of associations. Thought Content/Perceptions: No NETTIE, noted both AVH, noted delusion  Insight: poor  Judgment: poor  Memory: Immediate, recent, and remote appear impaired, though not formally tested.   Attention: maintained throughout interview  Fund of knowledge: Average  Gait/Balance: WNL/WNL         IMPRESSION:   Neurocognitive D/O Alzheimer type with behavioral disturba    Tardive Dyskineasa   [place]ry movements    PLAN:  Psychiatric management:medication initiation and titration, group and individual therapy, safe and theraputic environment. Status of problem/condition: ?Improving  Medical co-morbidities: Management per [facility] group, appreciate assistance  Legal Status:Pending  Disposition:estimated LOS: Pending. The treatment team reviewed with the patient the diagnosis and treatment recommendations to include the risks, benefits, and side effects of chosen medications. The patient verbalized understanding and agreed with the treatment regimen as outlined above. The patient was encouraged to participate in groups. Medical records, Labs, Diagnotic tests reviewed  q15 min safety checks for safety  Interval History. Review current labs  Continue current medications Pending ingrezza  Supportive Therapy Provided  Pt had an opportunity to ask questions and address concerns  Pt encouraged to continue therapy group or individual.  Pt was in agreement with treatment plan. The risks benefits and side effects of medications were discussed with the patient, including alternatives and no treatment.     Electronically signed by Yasmin Gaspar DO on 7/27/2021 at 10:03 AM

## 2021-07-27 NOTE — GROUP NOTE
Group Therapy Note    Date: 7/27/2021    Group Start Time: 2668  Group End Time: 1600    Number of Participants: 3/6    Type: Exercise/Recreation Group    Group Topic/Objective: Chair Exercises    Notes:  Pt encouraged to attend, pt refused.      Discipline Responsible: Certified Therapeutic Recreation Specialist     Electronically signed by Violet Canes, [place], [address] [place] on 7/27/2021 at 4:01 PM

## 2021-07-27 NOTE — GROUP NOTE
Group Therapy Note    Date: 7/27/2021    Group Start Time: 1030  Group End Time: 1100  Group Topic: Psychoeducation    [address]    SWATI Bonilla, MA        Group Therapy Note    Attendees: 5/6       Notes:  Pts participated in recreational therapy group; Pet Therapy. Volunteer brought pet therapy trained dog on the unit and allowed pts to interact with pts. Purpose was to encourage socialization, improve mood, and introduce coping skill. Pt encouraged to attend, pt refused.        Discipline Responsible: Certified Therapeutic Recreation Specialist       Signature:  Leandro Wray [place], [place]

## 2021-07-27 NOTE — GROUP NOTE
Group Therapy Note    Date: 7/27/2021    Group Start Time: 0830  Group End Time: 0900    Number of Participants: 3/6    Type: Morning Goals Group/ Community Meeting    Group Topic/Objective: Set Goal For The Day and to review Unit Rules and Regulations. Patient's Goal:  Pt states her goal is \"Nothing. \"    Notes:  Pt participated more effectively in group when given questions that only required 1-2 word responses. Pt states she is feeling \"tired\" and is grateful for \"my life. \"  Pt reports restless sleep and asked about how long she slept stated \"none. \"    Depression (0-10): 0    Anxiety (0-10): 0    Irritability/Aggitation (0-10): 0    Status After Intervention:  Improved    Participation Level:  Active Listener and Interactive    Participation Quality: Appropriate, Attentive and Sharing    Speech:  normal    Thought Process/Content: Logical    Affective Functioning: Blunted    Mood: Blunted    Level of consciousness:  Alert and Attentive    Response to Learning: Able to verbalize current knowledge/experience    Endings: None Reported    Modes of Intervention: Education, Support and Socialization    Discipline Responsible: Certified Therapeutic Recreation Specialist     Electronically signed by Chinmay Rodarte MA on 7/27/2021 at 9:46 AM

## 2021-07-27 NOTE — PROGRESS NOTES
Pt talking to self quietly so far this shift. No outbursts noted so far this shift. Pt sits in DR alone chatting to self. Pt became upset during tx dog visit and went back to room. Pt assisted back out to DR for lunch. Pt's speech remains difficult to understand. Pt directable and cooperative. Pt currently showering with assistance. Pt safety maintained. Continue to monitor.

## 2021-07-28 PROCEDURE — 6370000000 HC RX 637 (ALT 250 FOR IP): Performed by: PSYCHIATRY & NEUROLOGY

## 2021-07-28 PROCEDURE — 1240000000 HC EMOTIONAL WELLNESS R&B

## 2021-07-28 PROCEDURE — 6370000000 HC RX 637 (ALT 250 FOR IP): Performed by: PHYSICIAN ASSISTANT

## 2021-07-28 PROCEDURE — 99232 SBSQ HOSP IP/OBS MODERATE 35: CPT | Performed by: PSYCHIATRY & NEUROLOGY

## 2021-07-28 RX ADMIN — ATORVASTATIN CALCIUM 20 MG: 20 TABLET, FILM COATED ORAL at 08:46

## 2021-07-28 RX ADMIN — OXYBUTYNIN CHLORIDE 5 MG: 5 TABLET, EXTENDED RELEASE ORAL at 08:46

## 2021-07-28 RX ADMIN — CHOLECALCIFEROL TAB 125 MCG (5000 UNIT) 5000 UNITS: 125 TAB at 08:46

## 2021-07-28 RX ADMIN — BENZTROPINE MESYLATE 1 MG: 1 TABLET ORAL at 20:28

## 2021-07-28 RX ADMIN — BENZTROPINE MESYLATE 1 MG: 1 TABLET ORAL at 08:46

## 2021-07-28 RX ADMIN — OLANZAPINE 5 MG: 5 TABLET, FILM COATED ORAL at 20:28

## 2021-07-28 RX ADMIN — AMLODIPINE BESYLATE 5 MG: 5 TABLET ORAL at 08:46

## 2021-07-28 RX ADMIN — POTASSIUM CHLORIDE 10 MEQ: 10 TABLET, EXTENDED RELEASE ORAL at 08:46

## 2021-07-28 RX ADMIN — TRAZODONE HYDROCHLORIDE 50 MG: 50 TABLET ORAL at 20:28

## 2021-07-28 RX ADMIN — LEVOTHYROXINE SODIUM 25 MCG: 25 TABLET ORAL at 06:13

## 2021-07-28 RX ADMIN — DIVALPROEX SODIUM 500 MG: 125 CAPSULE ORAL at 08:46

## 2021-07-28 RX ADMIN — LOSARTAN POTASSIUM 100 MG: 100 TABLET, FILM COATED ORAL at 08:46

## 2021-07-28 ASSESSMENT — PAIN SCALES - GENERAL
PAINLEVEL_OUTOF10: 0
PAINLEVEL_OUTOF10: 0

## 2021-07-28 NOTE — PLAN OF CARE
Problem: Skin Integrity:  Goal: Will show no infection signs and symptoms  Description: Will show no infection signs and symptoms  7/27/2021 2225 by Carmen Chapman LPN  Outcome: Ongoing  7/27/2021 1415 by Polina Paz RN  Outcome: Ongoing  Goal: Absence of new skin breakdown  Description: Absence of new skin breakdown  7/27/2021 2225 by Carmen Chapman LPN  Outcome: Ongoing  7/27/2021 1415 by Polina Paz RN  Outcome: Ongoing     Problem: Altered Mood, Depressive Behavior:  Goal: Absence of self-harm  Description: Absence of self-harm  7/27/2021 2225 by Carmen Chapman LPN  Outcome: Ongoing  7/27/2021 1415 by Polina Paz RN  Outcome: Ongoing  Goal: Patient specific goal  Description: Patient specific goal  7/27/2021 2225 by Carmen Chapman LPN  Outcome: Ongoing  7/27/2021 1415 by Polina Paz RN  Outcome: Ongoing     Problem: Altered Mood, Deterioration in Function:  Goal: Ability to perform activities of daily living will improve  Description: Ability to perform activities of daily living will improve  7/27/2021 2225 by Carmen Chapman LPN  Outcome: Ongoing  7/27/2021 1415 by Polina Paz RN  Outcome: Ongoing  Goal: Able to verbalize reality based thinking  Description: Able to verbalize reality based thinking  7/27/2021 2225 by Carmen Chapman LPN  Outcome: Ongoing  7/27/2021 1415 by Polina Paz RN  Outcome: Ongoing  Goal: Skin appearance normal  Description: Skin appearance normal  7/27/2021 2225 by Carmen Chapman LPN  Outcome: Ongoing  7/27/2021 1415 by Polina Paz RN  Outcome: Ongoing  Goal: Maintenance of adequate nutrition will improve  Description: Maintenance of adequate nutrition will improve  7/27/2021 2225 by Carmen Chapman LPN  Outcome: Ongoing  7/27/2021 1415 by Polina Paz RN  Outcome: Ongoing  Goal: Ability to tolerate increased activity will improve  Description: Ability to tolerate increased activity will improve  7/27/2021 2225 by Gracia Tyson LPN  Outcome: Ongoing  7/27/2021 1415 by Herbie Hamlin RN  Outcome: Ongoing  Goal: Participates in care planning  Description: Participates in care planning  7/27/2021 2225 by Gracia Tyson LPN  Outcome: Ongoing  7/27/2021 1415 by Herbie Hamlin RN  Outcome: Ongoing  Goal: Patient specific goal  Description: Patient specific goal  7/27/2021 2225 by Gracia Tyson LPN  Outcome: Ongoing  7/27/2021 1415 by Herbie Hamlin RN  Outcome: Ongoing     Problem: Falls - Risk of:  Goal: Will remain free from falls  Description: Will remain free from falls  7/27/2021 2225 by Gracia Tyson LPN  Outcome: Ongoing  7/27/2021 1415 by Herbie Hamlin RN  Outcome: Ongoing  Goal: Absence of physical injury  Description: Absence of physical injury  7/27/2021 2225 by Gracia Tyson LPN  Outcome: Ongoing  7/27/2021 1415 by Herbie Hamlin RN  Outcome: Ongoing     Problem: Confusion - Acute:  Goal: Absence of continued neurological deterioration signs and symptoms  Description: Absence of continued neurological deterioration signs and symptoms  7/27/2021 2225 by Gracia Tyson LPN  Outcome: Ongoing  7/27/2021 1415 by Herbie Hamlin RN  Outcome: Ongoing  Goal: Mental status will be restored to baseline  Description: Mental status will be restored to baseline  7/27/2021 2225 by Gracia Tyson LPN  Outcome: Ongoing  7/27/2021 1415 by Herbie Hamlin RN  Outcome: Ongoing     Problem: Discharge Planning:  Goal: Ability to perform activities of daily living will improve  Description: Ability to perform activities of daily living will improve  7/27/2021 2225 by Gracia Tyson LPN  Outcome: Ongoing  7/27/2021 1415 by Herbie Hamlin RN  Outcome: Ongoing  Goal: Participates in care planning  Description: Participates in care planning  7/27/2021 2225 by Gracia Tyson LPN  Outcome: Ongoing  7/27/2021 1415 by Herbie Hamlin RN  Outcome: Ongoing     Problem: Injury - Risk of, Physical Injury:  Goal: Will remain free from falls  Description: Will remain free from falls  7/27/2021 2225 by [place] Yue LPN  Outcome: Ongoing  7/27/2021 1415 by Katja Patel RN  Outcome: Ongoing  Goal: Absence of physical injury  Description: Absence of physical injury  7/27/2021 2225 by [place] Patch LPN  Outcome: Ongoing  7/27/2021 1415 by Katja Patel RN  Outcome: Ongoing     Problem: Mood - Altered:  Goal: Mood stable  Description: Mood stable  7/27/2021 2225 by [place] Patch LPN  Outcome: Ongoing  7/27/2021 1415 by Katja Patel RN  Outcome: Ongoing  Goal: Absence of abusive behavior  Description: Absence of abusive behavior  7/27/2021 2225 by Nga Patch LPN  Outcome: Ongoing  7/27/2021 1415 by Katja Patel RN  Outcome: Ongoing  Goal: Verbalizations of feeling emotionally comfortable while being cared for will increase  Description: Verbalizations of feeling emotionally comfortable while being cared for will increase  7/27/2021 2225 by Harbor Isletarik Turner LPN  Outcome: Ongoing  7/27/2021 1415 by Katja Patel RN  Outcome: Ongoing     Problem: Psychomotor Activity - Altered:  Goal: Absence of psychomotor disturbance signs and symptoms  Description: Absence of psychomotor disturbance signs and symptoms  7/27/2021 2225 by Ngatarik Turner LPN  Outcome: Ongoing  7/27/2021 1415 by Katja Patel RN  Outcome: Ongoing     Problem: Sensory Perception - Impaired:  Goal: Demonstrations of improved sensory functioning will increase  Description: Demonstrations of improved sensory functioning will increase  7/27/2021 2225 by Harbor Isletarik Turner LPN  Outcome: Ongoing  7/27/2021 1415 by Katja Patel RN  Outcome: Ongoing  Goal: Decrease in sensory misperception frequency  Description: Decrease in sensory misperception frequency  7/27/2021 2225 by Ngatarik Turner LPN  Outcome: Ongoing  7/27/2021 1415 by Katja Patel RN  Outcome: Ongoing  Goal: Able to refrain from responding to false sensory perceptions  Description: Able to refrain from responding to false sensory perceptions  7/27/2021 2225 by Kaiser Davison LPN  Outcome: Ongoing  7/27/2021 1415 by Mary Paz RN  Outcome: Ongoing  Goal: Demonstrates accurate environmental perceptions  Description: Demonstrates accurate environmental perceptions  7/27/2021 2225 by Kaiser Davison LPN  Outcome: Ongoing  7/27/2021 1415 by Mary Paz RN  Outcome: Ongoing  Goal: Able to distinguish between reality-based and nonreality-based thinking  Description: Able to distinguish between reality-based and nonreality-based thinking  7/27/2021 2225 by Kaiser Davison LPN  Outcome: Ongoing  7/27/2021 1415 by Mary Paz RN  Outcome: Ongoing  Goal: Able to interrupt nonreality-based thinking  Description: Able to interrupt nonreality-based thinking  7/27/2021 2225 by Kaiser Davison LPN  Outcome: Ongoing  7/27/2021 1415 by Mary Paz RN  Outcome: Ongoing     Problem: Sleep Pattern Disturbance:  Goal: Appears well-rested  Description: Appears well-rested  7/27/2021 2225 by Kaiser Davison LPN  Outcome: Ongoing  7/27/2021 1415 by Mary Paz RN  Outcome: Ongoing

## 2021-07-28 NOTE — GROUP NOTE
Group Therapy Note    Date: 7/28/2021    Group Start Time: 0830  Group End Time: 0900    Number of Participants: 5/6    Type: Morning Goals Group/ Community Meeting    Group Topic/Objective: Set Goal For The Day and to review Unit Rules and Regulations. Patient's Goal:  Pt refused to state a goal.     Notes:  Pt states she is feeling \"ok\" and is grateful for \"being alive. \"  Pt unintelligible when asked about sleep. Depression (0-10): Pt refused to state. Anxiety (0-10): Pt refused to state. Irritability/Aggitation (0-10): Pt refused to state.      Status After Intervention:  Unchanged    Participation Level: Minimal    Participation Quality: Resistant    Speech:  pressured    Thought Process/Content: Logical    Affective Functioning: Blunted    Mood: Blunted    Level of consciousness:  Inattentive    Response to Learning: Resistant    Endings: None Reported    Modes of Intervention: Education, Support and Socialization    Discipline Responsible: Certified Therapeutic Recreation Specialist     Electronically signed by SWATI Quiros MA on 7/29/2021 at 10:07 AM

## 2021-07-28 NOTE — PLAN OF CARE
Problem: Skin Integrity:  Goal: Will show no infection signs and symptoms  Description: Will show no infection signs and symptoms  7/28/2021 1049 by Maite Joyce RN  Outcome: Ongoing  7/27/2021 2225 by Burgess Ivis LPN  Outcome: Ongoing  Goal: Absence of new skin breakdown  Description: Absence of new skin breakdown  7/28/2021 1049 by Maite Joyce RN  Outcome: Ongoing  7/27/2021 2225 by Burgess Ivis LPN  Outcome: Ongoing

## 2021-07-28 NOTE — PROGRESS NOTES
Patient alert to self and has awareness of season. Speech difficult to understand. Constant tongue thrusting. Talking to herself when sitting alone. Does not engage with other patients. She is pleasant and cooperative. Fall precautions continued. No outburst of yelling this morning. Labile emotions. Seen crying at table. Attempts made to console patient and determine reason for tears. She pointed to items on the counter and stated \"that's my mess\"  A  short time later, patient sitting at table and laughing to herself.

## 2021-07-28 NOTE — PROGRESS NOTES
Psychiatric Progress Note    Zelpha Habermann  [id number]  07/28/21    CHIEF COMPLAINT: I get upset    HPI: Zelpha Habermann is a 81 yo  female who presents for exacerbation of Neurocognitive D/O alzehimers type with behavioral disturbance. Pt noted recent exacarbation of mood and confusion with agitationf. Pt noted she currently feels safe and comfortable on the unit. Pt was in agreement with treatment team.  Pt was polite and cordial during the interview process. 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 \"  Pt noted she is sleeping \"okay. Becki Graven last night. \"  Pt noted her apptetite is reduced. Pt rated her depresssion a \"2,\" on a scale of zero to ten with ten being the worst and zero being none. Pt rated her anxiety a \"3,\" on the same scale. Pt noted passive thoughts to harm herself at this time. Pt denied any thoughts to harm anyone else.   Pt denied any auditory or visiual hallucintations.           Allergies   Allergen Reactions    Penicillins Hives    Sulfa Antibiotics Other (See Comments)     Reaction unknown       Medications Prior to Admission: amLODIPine (NORVASC) 5 MG tablet, Take 5 mg by mouth daily  vitamin D3 (CHOLECALCIFEROL) 125 MCG (5000 UT) TABS tablet, Take 50,000 Units by mouth daily  fenofibrate (TRIGLIDE) 160 MG tablet, Take 160 mg by mouth daily  levothyroxine (SYNTHROID) 25 MCG tablet, Take 25 mcg by mouth daily  losartan (COZAAR) 100 MG tablet, Take 100 mg by mouth daily  oxybutynin (DITROPAN) 5 MG tablet, Take 5 mg by mouth daily  potassium chloride (KLOR-CON) 10 MEQ extended release tablet, Take 10 mEq by mouth daily  simvastatin (ZOCOR) 40 MG tablet, Take 40 mg by mouth daily  venlafaxine (EFFEXOR XR) 75 MG extended release capsule, Take 75 mg by mouth daily    Past Medical History:   Diagnosis Date    Essential hypertension     Hyperlipidemia         Patient Active Problem List Diagnosis    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 PSYCHIATRIC ASSESSMENT / MENTAL STATUS EXAM:   Vitals: Blood pressure (!) 147/86, pulse 72, temperature 98.1 °F (36.7 °C), temperature source Temporal, resp. rate 16, height 5' 2\" (1.575 m), weight 142 lb (64.4 kg), SpO2 98 %. CONSTITUTIONAL:    Appearance: appears stated age. alert and oriented to person, place. no acute distress. Adequate grooming and hygeine. Good eye contact. No prominent physical abnormalities. Attitude: Manner is cooperative and pleasant  Motor: No psychomotor agitation, retardation or abnormal movements noted  Speech: Clearly articulated; normal rate, volume, tone & amount. Language: intact understanding and production  Mood: depressed  Affect: euthymic, full range, non-labile, congruent with mood and content of speech  Thought Production: Spontaneous. Thought Form: linear at times yet Noted tangentiality and circumstantiality with loosening of associations. Thought Content/Perceptions: No NETTIE, noted both AVH, noted delusion  Insight: poor  Judgment: poor  Memory: Immediate, recent, and remote appear impaired, though not formally tested.   Attention: maintained throughout interview  Fund of knowledge: Average  Gait/Balance: WNL/WNL         IMPRESSION:   Neurocognitive D/O Alzheimer type with behavioral disturba    Tardive Dyskineasa   [place] movements    PLAN:  Psychiatric management:medication initiation and titration, group and individual therapy, safe and theraputic environment. Status of problem/condition: ?Improving  Medical co-morbidities: Management per [facility] group, appreciate assistance  Legal Status:Pending  Disposition:estimated LOS: Pending. The treatment team reviewed with the patient the diagnosis and treatment recommendations to include the risks, benefits, and side effects of chosen medications. The patient verbalized understanding and agreed with the treatment regimen as outlined above. The patient was encouraged to participate in groups. Medical records, Labs, Diagnotic tests reviewed  q15 min safety checks for safety  Interval History. Review current labs  Continue current medications Pending ingrezza  Supportive Therapy Provided  Pt had an opportunity to ask questions and address concerns  Pt encouraged to continue therapy group or individual.  Pt was in agreement with treatment plan. The risks benefits and side effects of medications were discussed with the patient, including alternatives and no treatment.     Electronically signed by Clint Joyce DO on 7/28/2021 at 11:04 AM

## 2021-07-28 NOTE — GROUP NOTE
Group Therapy Note    Date: 7/28/2021    Group Start Time: 9084  Group End Time: 1600  Group Topic: Healthy Living/Wellness    [address]    SWATI Dugan        Group Therapy Note    Attendees: 2/5       Notes:  Pt attended group, but refused to participate. Pt did not respond to encouragement/prompting.     Status After Intervention:  Unchanged    Participation Level: None    Participation Quality: Resistant      Speech:  pressured      Thought Process/Content: Logical      Affective Functioning: Blunted      Mood: Blunted      Level of consciousness:  Inattentive      Response to Learning: Resistant      Endings: None Reported    Modes of Intervention: Activity and Movement      Discipline Responsible: Certified Therapeutic Recreation Specialist       Signature:  Shannan Dugan [place]

## 2021-07-28 NOTE — GROUP NOTE
Group Therapy Note    Date: 7/28/2021    Group Start Time: 1371  Group End Time: 1500  Group Topic: Psychotherapy    [address], [place]        Group Therapy Note    Attendees: 1/5         Notes:  Pt was invited to attend group. Pt was in bed and denied invite.       Signature:  RUBIN Pelaez

## 2021-07-29 PROCEDURE — 6370000000 HC RX 637 (ALT 250 FOR IP): Performed by: PHYSICIAN ASSISTANT

## 2021-07-29 PROCEDURE — 1240000000 HC EMOTIONAL WELLNESS R&B

## 2021-07-29 PROCEDURE — 6370000000 HC RX 637 (ALT 250 FOR IP): Performed by: PSYCHIATRY & NEUROLOGY

## 2021-07-29 PROCEDURE — 99232 SBSQ HOSP IP/OBS MODERATE 35: CPT | Performed by: PSYCHIATRY & NEUROLOGY

## 2021-07-29 RX ADMIN — DEUTETRABENAZINE 6 MG: 6 TABLET, COATED ORAL at 16:50

## 2021-07-29 RX ADMIN — ATORVASTATIN CALCIUM 20 MG: 20 TABLET, FILM COATED ORAL at 08:24

## 2021-07-29 RX ADMIN — DIVALPROEX SODIUM 500 MG: 125 CAPSULE ORAL at 08:23

## 2021-07-29 RX ADMIN — AMLODIPINE BESYLATE 5 MG: 5 TABLET ORAL at 08:23

## 2021-07-29 RX ADMIN — TRAZODONE HYDROCHLORIDE 50 MG: 50 TABLET ORAL at 20:50

## 2021-07-29 RX ADMIN — BENZTROPINE MESYLATE 1 MG: 1 TABLET ORAL at 20:49

## 2021-07-29 RX ADMIN — CHOLECALCIFEROL TAB 125 MCG (5000 UNIT) 5000 UNITS: 125 TAB at 08:23

## 2021-07-29 RX ADMIN — LEVOTHYROXINE SODIUM 25 MCG: 25 TABLET ORAL at 05:53

## 2021-07-29 RX ADMIN — OXYBUTYNIN CHLORIDE 5 MG: 5 TABLET, EXTENDED RELEASE ORAL at 08:24

## 2021-07-29 RX ADMIN — BENZTROPINE MESYLATE 1 MG: 1 TABLET ORAL at 08:24

## 2021-07-29 RX ADMIN — OLANZAPINE 5 MG: 5 TABLET, FILM COATED ORAL at 20:49

## 2021-07-29 RX ADMIN — POTASSIUM CHLORIDE 10 MEQ: 10 TABLET, EXTENDED RELEASE ORAL at 08:23

## 2021-07-29 RX ADMIN — LOSARTAN POTASSIUM 100 MG: 100 TABLET, FILM COATED ORAL at 08:24

## 2021-07-29 ASSESSMENT — PAIN SCALES - GENERAL
PAINLEVEL_OUTOF10: 0
PAINLEVEL_OUTOF10: 0

## 2021-07-29 NOTE — PROGRESS NOTES
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 PSYCHIATRIC ASSESSMENT / MENTAL STATUS EXAM:   Vitals: Blood pressure (!) 146/82, pulse 67, temperature 97.8 °F (36.6 °C), temperature source Temporal, resp. rate 16, height 5' 2\" (1.575 m), weight 142 lb (64.4 kg), SpO2 95 %. CONSTITUTIONAL:    Appearance: appears stated age. alert and oriented to person, place. no acute distress. Adequate grooming and hygeine. Good eye contact. No prominent physical abnormalities. Attitude: Manner is cooperative and pleasant  Motor: No psychomotor agitation, retardation or abnormal movements noted  Speech: Clearly articulated; normal rate, volume, tone & amount. Language: intact understanding and production  Mood: depressed  Affect: euthymic, full range, non-labile, congruent with mood and content of speech  Thought Production: Spontaneous. Thought Form: linear at times yet Noted tangentiality and circumstantiality with loosening of associations. Thought Content/Perceptions: No NETTIE, noted both AVH, noted delusion  Insight: poor  Judgment: poor  Memory: Immediate, recent, and remote appear impaired, though not formally tested.   Attention: maintained throughout interview  Fund of knowledge: Average  Gait/Balance: WNL/WNL         IMPRESSION:   Neurocognitive D/O Alzheimer type with behavioral disturba    Tardive Dyskineasa   Fannin movements    PLAN:  Psychiatric management:medication initiation and titration, group and individual therapy, safe and theraputic environment. Status of problem/condition: ?Improving  Medical co-morbidities: Management per [facility] group, appreciate assistance  Legal Status:Pending  Disposition:estimated LOS: Pending. The treatment team reviewed with the patient the diagnosis and treatment recommendations to include the risks, benefits, and side effects of chosen medications. The patient verbalized understanding and agreed with the treatment regimen as outlined above. The patient was encouraged to participate in groups. Medical records, Labs, Diagnotic tests reviewed  q15 min safety checks for safety  Interval History. Review current labs  Continue current medications Pending ingrezza  Supportive Therapy Provided  Pt had an opportunity to ask questions and address concerns  Pt encouraged to continue therapy group or individual.  Pt was in agreement with treatment plan. The risks benefits and side effects of medications were discussed with the patient, including alternatives and no treatment.     Electronically signed by Denilson Mcgill DO on 7/29/2021 at 12:17 PM

## 2021-07-29 NOTE — GROUP NOTE
Group Therapy Note    Date: 7/29/2021    Group Start Time: 0083  Group End Time: 1600    Number of Participants: 4/5    Type: Exercise/Recreation Group    Group Topic/Objective: Chair Exercises    Notes:  Pt completed ~50% of the exercises offered. Pt noted to laugh spontaneously. Status After Intervention:  Improved    Participation Level:  Active Listener and Interactive    Participation Quality: Appropriate, Attentive and Sharing    Speech:  pressured    Thought Process/Content: Logical    Affective Functioning: Congruent    Mood: Bright    Level of consciousness:  Alert and Attentive    Response to Learning: Able to verbalize current knowledge/experience    Endings: None Reported    Modes of Intervention: Education, Support and Socialization    Discipline Responsible: Certified Therapeutic Recreation Specialist     Electronically signed by Saurav Shahid MA on 7/29/2021 at 4:09 PM

## 2021-07-29 NOTE — GROUP NOTE
Group Therapy Note    Date: 7/29/2021    Group Start Time: 0830  Group End Time: 0900    Number of Participants: 5/5    Type: Morning Goals Group/ Community Meeting    Group Topic/Objective: Set Goal For The Day and to review Unit Rules and Regulations. Patient's Goal:  Pt states \"Drink this shit. \"    Notes:  Pt participated in group with Mod prompting. Pt's speech less intelligible than in previous sessions and required prompting to repeat. Pt states she is feeling \"good\" and refused to state anything she is grateful for  Pt reports restless sleep and stated \"not many. \"    Depression (0-10): Pt refused to state. Anxiety (0-10): Pt refused to state. Irritability/Aggitation (0-10): Pt refused to state.      Status After Intervention:  Improved    Participation Level: Interactive    Participation Quality: Sharing    Speech:  pressured    Thought Process/Content: Logical    Affective Functioning: Blunted    Mood: Blunted    Level of consciousness:  Alert and Attentive    Response to Learning: Able to verbalize current knowledge/experience    Endings: None Reported    Modes of Intervention: Education, Support and Socialization    Discipline Responsible: Certified Therapeutic Recreation Specialist     Electronically signed by SWATI Crowell MA on 7/29/2021 at 9:11 AM

## 2021-07-29 NOTE — BH NOTE
Pt in dayroom watching TV at the beginning of this shift. Medication compliant. Calm, cooperative, and pleasant during assessment. Speech difficult to understand. Constant tongue thrusting. No episodes of crying during this shift. Pt has been resting/sleeping well this shift. Call light within reach.

## 2021-07-29 NOTE — PLAN OF CARE
Problem: Skin Integrity:  Goal: Will show no infection signs and symptoms  Description: Will show no infection signs and symptoms  7/28/2021 2016 by Helena Yap RN  Outcome: Ongoing  7/28/2021 1049 by Camie Cooks, RN  Outcome: Ongoing  Goal: Absence of new skin breakdown  Description: Absence of new skin breakdown  7/28/2021 2016 by Helena Yap RN  Outcome: Ongoing  7/28/2021 1049 by Camie Cooks, RN  Outcome: Ongoing     Problem: Altered Mood, Depressive Behavior:  Goal: Absence of self-harm  Description: Absence of self-harm  7/28/2021 2016 by Helena Yap RN  Outcome: Ongoing  7/28/2021 1049 by Camie Cooks, RN  Outcome: Ongoing  Goal: Patient specific goal  Description: Patient specific goal  7/28/2021 2016 by Heelna Yap RN  Outcome: Ongoing  7/28/2021 1049 by Camie Cooks, RN  Outcome: Ongoing     Problem: Altered Mood, Deterioration in Function:  Goal: Ability to perform activities of daily living will improve  Description: Ability to perform activities of daily living will improve  7/28/2021 2016 by Helena Yap RN  Outcome: Ongoing  7/28/2021 1049 by Camie Cooks, RN  Outcome: Ongoing  Goal: Able to verbalize reality based thinking  Description: Able to verbalize reality based thinking  7/28/2021 2016 by Helena Yap RN  Outcome: Ongoing  7/28/2021 1049 by Camie Cooks, RN  Outcome: Ongoing  Goal: Skin appearance normal  Description: Skin appearance normal  7/28/2021 2016 by Helena Yap RN  Outcome: Ongoing  7/28/2021 1049 by Camie Cooks, RN  Outcome: Ongoing  Goal: Maintenance of adequate nutrition will improve  Description: Maintenance of adequate nutrition will improve  7/28/2021 2016 by Helena Yap RN  Outcome: Ongoing  7/28/2021 1049 by Camie Cooks, RN  Outcome: Ongoing  Goal: Ability to tolerate increased activity will improve  Description: Ability to tolerate increased activity will improve  7/28/2021 2016 by Helena Yap RN  Outcome: Ongoing  7/28/2021 1049 by Ridge Pichardo RN  Outcome: Ongoing  Goal: Participates in care planning  Description: Participates in care planning  7/28/2021 2016 by Roosevelt Casillas RN  Outcome: Ongoing  7/28/2021 1049 by Ridge Pichardo RN  Outcome: Ongoing  Goal: Patient specific goal  Description: Patient specific goal  7/28/2021 2016 by Roosevelt Casillas RN  Outcome: Ongoing  7/28/2021 1049 by Ridge Pichardo RN  Outcome: Ongoing     Problem: Falls - Risk of:  Goal: Will remain free from falls  Description: Will remain free from falls  7/28/2021 2016 by Roosevelt Casillas RN  Outcome: Ongoing  7/28/2021 1049 by Ridge Pichardo RN  Outcome: Ongoing  Goal: Absence of physical injury  Description: Absence of physical injury  7/28/2021 2016 by Roosevelt Casillas RN  Outcome: Ongoing  7/28/2021 1049 by Ridge Pichardo RN  Outcome: Ongoing     Problem: Confusion - Acute:  Goal: Absence of continued neurological deterioration signs and symptoms  Description: Absence of continued neurological deterioration signs and symptoms  7/28/2021 2016 by Roosevelt Casillas RN  Outcome: Ongoing  7/28/2021 1049 by Ridge Pichardo RN  Outcome: Ongoing  Goal: Mental status will be restored to baseline  Description: Mental status will be restored to baseline  7/28/2021 2016 by Roosevelt Casillas RN  Outcome: Ongoing  7/28/2021 1049 by Ridge Pichardo RN  Outcome: Ongoing     Problem: Discharge Planning:  Goal: Ability to perform activities of daily living will improve  Description: Ability to perform activities of daily living will improve  7/28/2021 2016 by Roosevelt Casillas RN  Outcome: Ongoing  7/28/2021 1049 by Ridge Pichardo RN  Outcome: Ongoing  Goal: Participates in care planning  Description: Participates in care planning  7/28/2021 2016 by Roosevelt Casillas RN  Outcome: Ongoing  7/28/2021 1049 by Ridge Pichardo RN  Outcome: Ongoing     Problem: Injury - Risk of, Physical Injury:  Goal: Will remain free from falls  Description: Will remain free from falls  7/28/2021 2016 by Gómez Morales RN  Outcome: Ongoing  7/28/2021 1049 by Anushka Llanos RN  Outcome: Ongoing  Goal: Absence of physical injury  Description: Absence of physical injury  7/28/2021 2016 by Gómez Morales RN  Outcome: Ongoing  7/28/2021 1049 by Anushka Llanos RN  Outcome: Ongoing     Problem: Mood - Altered:  Goal: Mood stable  Description: Mood stable  7/28/2021 2016 by Gómez Morales RN  Outcome: Ongoing  7/28/2021 1049 by Anushka Llanos RN  Outcome: Ongoing  Goal: Absence of abusive behavior  Description: Absence of abusive behavior  7/28/2021 2016 by Gómez Morales RN  Outcome: Ongoing  7/28/2021 1049 by Anushka Llanos RN  Outcome: Ongoing  Goal: Verbalizations of feeling emotionally comfortable while being cared for will increase  Description: Verbalizations of feeling emotionally comfortable while being cared for will increase  7/28/2021 2016 by Gómez Morales RN  Outcome: Ongoing  7/28/2021 1049 by Anushka Llanos RN  Outcome: Ongoing     Problem: Psychomotor Activity - Altered:  Goal: Absence of psychomotor disturbance signs and symptoms  Description: Absence of psychomotor disturbance signs and symptoms  7/28/2021 2016 by Gómez Morales RN  Outcome: Ongoing  7/28/2021 1049 by Anushka Llanos RN  Outcome: Ongoing     Problem: Sensory Perception - Impaired:  Goal: Demonstrations of improved sensory functioning will increase  Description: Demonstrations of improved sensory functioning will increase  7/28/2021 2016 by Gómez Morales RN  Outcome: Ongoing  7/28/2021 1049 by Anushka Llanos RN  Outcome: Ongoing  Goal: Decrease in sensory misperception frequency  Description: Decrease in sensory misperception frequency  7/28/2021 2016 by Gómez Morales RN  Outcome: Ongoing  7/28/2021 1049 by Anushka Llanos RN  Outcome: Ongoing  Goal: Able to refrain from responding to false sensory perceptions  Description: Able to refrain from responding to false sensory perceptions  7/28/2021 2016 by Sourav Ocasio RN  Outcome: Ongoing  7/28/2021 1049 by Haley Bailey RN  Outcome: Ongoing  Goal: Demonstrates accurate environmental perceptions  Description: Demonstrates accurate environmental perceptions  7/28/2021 2016 by Sourav Ocasio RN  Outcome: Ongoing  7/28/2021 1049 by Haley Bailey RN  Outcome: Ongoing  Goal: Able to distinguish between reality-based and nonreality-based thinking  Description: Able to distinguish between reality-based and nonreality-based thinking  7/28/2021 2016 by Sourav Ocasio RN  Outcome: Ongoing  7/28/2021 1049 by Haley Bailey RN  Outcome: Ongoing  Goal: Able to interrupt nonreality-based thinking  Description: Able to interrupt nonreality-based thinking  7/28/2021 2016 by Sourav Ocasio RN  Outcome: Ongoing  7/28/2021 1049 by Haley Bailey RN  Outcome: Ongoing     Problem: Sleep Pattern Disturbance:  Goal: Appears well-rested  Description: Appears well-rested  7/28/2021 2016 by Sourav Ocasio RN  Outcome: Ongoing  7/28/2021 1049 by Haley Bailey RN  Outcome: Ongoing

## 2021-07-29 NOTE — GROUP NOTE
Group Therapy Note    Date: 7/29/2021    Group Start Time: 1000  Group End Time: 7853    Number of Participants: 5/5    Type: Spirituality    Group Topic/Objective: Religous discussion with [employer]. Notes:  Pt participated well in the group. Pt engaged in discussion and completed meditation provided. Status After Intervention:  Improved    Participation Level:  Active Listener and Interactive    Participation Quality: Appropriate, Attentive and Sharing    Speech:  normal    Thought Process/Content: Logical    Affective Functioning: Blunted    Mood: Blunted    Level of consciousness:  Alert and Attentive    Response to Learning: Able to verbalize current knowledge/experience and Able to verbalize/acknowledge new learning    Endings: None Reported    Modes of Intervention: Education, Support and Socialization    Discipline Responsible: Certified Therapeutic Recreation Specialist     Electronically signed by Duglas Sawant MA on 7/30/2021 at 10:09 AM

## 2021-07-29 NOTE — GROUP NOTE
Group Therapy Note    Date: 7/29/2021    Group Start Time: 0230  Group End Time: 0300  Group Topic: Psychotherapy    [address], [place]        Group Therapy Note    Attendees: 3/5         Patient's Goal:  Socialization    Notes:  SWs met with pt for one-on-one meetings. Pt is still having tongue thrusting due to TD and is difficult to understand. Pt was engaged and attentive to the conversation as evident by facial expressions and eye contact. SW walked away for a moment to show another pt where the bathroom was. Upon returning, pt was hunched over with her arms wrapped around her head. SW asked pt. If she was okay and when she lifted her head her face appeared distraught and she was crying. It sounded like pt was asking for water therfore sw got her a cup of water. Once given water pt substantially calmed down and drank her water. Status After Intervention:  Improved    Participation Level:  Active Listener and Interactive (to her ability)    Participation Quality: Appropriate and Supportive (to her ability)      Speech:  normal and difficult to understand      Thought Process/Content: unkown      Affective Functioning: Incongruent      Mood: was happy and calm then distraught      Level of consciousness:  Alert and Attentive      Response to Learning: unkown      Endings: None Reported    Modes of Intervention: Support and Socialization      Discipline Responsible: /Counselor      Signature:  RUBIN Ratliff

## 2021-07-30 PROCEDURE — 6370000000 HC RX 637 (ALT 250 FOR IP): Performed by: PSYCHIATRY & NEUROLOGY

## 2021-07-30 PROCEDURE — 97166 OT EVAL MOD COMPLEX 45 MIN: CPT

## 2021-07-30 PROCEDURE — 97116 GAIT TRAINING THERAPY: CPT

## 2021-07-30 PROCEDURE — 1240000000 HC EMOTIONAL WELLNESS R&B

## 2021-07-30 PROCEDURE — 99233 SBSQ HOSP IP/OBS HIGH 50: CPT | Performed by: NURSE PRACTITIONER

## 2021-07-30 PROCEDURE — 97162 PT EVAL MOD COMPLEX 30 MIN: CPT

## 2021-07-30 PROCEDURE — 97535 SELF CARE MNGMENT TRAINING: CPT

## 2021-07-30 PROCEDURE — 6370000000 HC RX 637 (ALT 250 FOR IP): Performed by: PHYSICIAN ASSISTANT

## 2021-07-30 RX ADMIN — CHOLECALCIFEROL TAB 125 MCG (5000 UNIT) 5000 UNITS: 125 TAB at 08:29

## 2021-07-30 RX ADMIN — ATORVASTATIN CALCIUM 20 MG: 20 TABLET, FILM COATED ORAL at 08:29

## 2021-07-30 RX ADMIN — DEUTETRABENAZINE 6 MG: 6 TABLET, COATED ORAL at 16:56

## 2021-07-30 RX ADMIN — LEVOTHYROXINE SODIUM 25 MCG: 25 TABLET ORAL at 08:29

## 2021-07-30 RX ADMIN — DEUTETRABENAZINE 6 MG: 6 TABLET, COATED ORAL at 08:29

## 2021-07-30 RX ADMIN — BENZTROPINE MESYLATE 1 MG: 1 TABLET ORAL at 20:36

## 2021-07-30 RX ADMIN — OXYBUTYNIN CHLORIDE 5 MG: 5 TABLET, EXTENDED RELEASE ORAL at 08:29

## 2021-07-30 RX ADMIN — OLANZAPINE 5 MG: 5 TABLET, FILM COATED ORAL at 20:36

## 2021-07-30 RX ADMIN — TRAZODONE HYDROCHLORIDE 50 MG: 50 TABLET ORAL at 20:36

## 2021-07-30 RX ADMIN — DIVALPROEX SODIUM 500 MG: 125 CAPSULE ORAL at 08:28

## 2021-07-30 RX ADMIN — LOSARTAN POTASSIUM 100 MG: 100 TABLET, FILM COATED ORAL at 08:29

## 2021-07-30 RX ADMIN — AMLODIPINE BESYLATE 5 MG: 5 TABLET ORAL at 08:29

## 2021-07-30 RX ADMIN — POTASSIUM CHLORIDE 10 MEQ: 10 TABLET, EXTENDED RELEASE ORAL at 08:29

## 2021-07-30 RX ADMIN — BENZTROPINE MESYLATE 1 MG: 1 TABLET ORAL at 08:29

## 2021-07-30 ASSESSMENT — PAIN SCALES - GENERAL
PAINLEVEL_OUTOF10: 0
PAINLEVEL_OUTOF10: 0

## 2021-07-30 NOTE — PLAN OF CARE
[facility] Interdisciplinary Treatment Plan Note     Review Date & Time: 0900 07/303/2021    Admission Type:   Admission Type: Involuntary    Reason for admission:  Reason for Admission: altered mental status    Patient Diagnosis: Alzheimer's diease      PATIENT STRENGTHS:  Patient Strengths:Strengths:  (DURAN)  Patient Strengths and Limitations:Limitations:  (DURAN)  Addictive Behavior:Addictive Behavior  In the past 3 months, have you felt or has someone told you that you have a problem with:  :  (DURAN)  Medical Problems:   Past Medical History:   Diagnosis Date    Essential hypertension     Hyperlipidemia        Risk:  Fall RiskTotal: 71  Ermias Scale Ermias Scale Score: 21  BVC Total: 0      Status EXAM:   Status and Exam  Normal: No  Facial Expression: Exaggerated, Brightened  Affect: Appropriate  Level of Consciousness: Alert  Mood:Normal: No  Mood: Labile  Motor Activity:Normal: No  Motor Activity: Repetitive Acts  Interview Behavior: Cooperative  Preception: [place] to Person  Attention:Normal: Yes  Attention: Distractible  Thought Processes: Circumstantial  Thought Content:Normal: No  Thought Content: Other(See Comment) (DURAN)  Hallucinations: None  Delusions: No  Delusions: Other(See Comment) (DURAN)  Memory:Normal:  (DURAN)  Memory:  (Pt remembers this nurse, otherwise hard to assess memory)  Insight and Judgment: No  Insight and Judgment: Other(See comment) (DURAN)  Present Suicidal Ideation: No  Present Homicidal Ideation: No    Daily Assessment Last Entry:   Daily Sleep (WDL): Within Defined Limits  Patient Currently in Pain: Denies  Daily Nutrition (WDL): Within Defined Limits    Patient Monitoring:  Frequency of Checks: 4 times per hour, close    Psychiatric Symptoms:   Depression Symptoms  Depression Symptoms: Crying  Anxiety Symptoms  Anxiety Symptoms: Generalized  Jane Symptoms  Jane Symptoms: No problems reported or observed.      Psychosis Symptoms  Delusion Type: No problems reported or observed.     Suicide Risk CSSR-S:  1) Within the past month, have you wished you were dead or wished you could go to sleep and not wake up? : No  2) Have you actually had any thoughts of killing yourself? : No  6) Have you ever done anything, started to do anything, or prepared to do anything to end your life?: No        EDUCATION:   Learner Progress Toward Treatment Goals: Reviewed results and recommendations of this team and Reviewed group plan and strategies    Method: Individual    Outcome: Verbalized understanding    PATIENT GOALS: dischage    PLAN/TREATMENT RECOMMENDATIONS UPDATE: medication adjustment    Estimated Length of Stay Update:  12 days    Estimated Discharge Date Update: 08/04/2021  Discharge Criteria: medication adjustment      SHORT-TERM GOALS UPDATE:  Time frame for Short-Term Goals: 12 days     LONG-TERM GOALS UPDATE:  Time frame for Long-Term Goals: 12 days   Members Present in Team Meeting: See Signature Sheet    RUBIN Morgan

## 2021-07-30 NOTE — PLAN OF CARE
Problem: Skin Integrity:  Goal: Will show no infection signs and symptoms  Description: Will show no infection signs and symptoms  7/30/2021 1034 by Thelma Harper RN  Outcome: Ongoing  7/29/2021 2305 by Monica Alvarenga RN  Outcome: Ongoing  Goal: Absence of new skin breakdown  Description: Absence of new skin breakdown  7/30/2021 1034 by Thelma Harper RN  Outcome: Ongoing  7/29/2021 2305 by Monica Alvarenga RN  Outcome: Ongoing     Problem: Altered Mood, Depressive Behavior:  Goal: Absence of self-harm  Description: Absence of self-harm  7/30/2021 1034 by Thelma Harper RN  Outcome: Ongoing  7/29/2021 2305 by Monica Alvarenga RN  Outcome: Ongoing  Goal: Patient specific goal  Description: Patient specific goal  7/30/2021 1034 by Thelma Harper RN  Outcome: Ongoing  7/29/2021 2305 by Monica Alvarenga RN  Outcome: Ongoing     Problem: Altered Mood, Deterioration in Function:  Goal: Ability to perform activities of daily living will improve  Description: Ability to perform activities of daily living will improve  7/30/2021 1034 by Thelma Harper RN  Outcome: Ongoing  7/29/2021 2305 by Monica Alvarenga RN  Outcome: Ongoing  Goal: Able to verbalize reality based thinking  Description: Able to verbalize reality based thinking  7/30/2021 1034 by Thelma Harper RN  Outcome: Ongoing  7/29/2021 2305 by Monica Alvarenga RN  Outcome: Ongoing  Goal: Skin appearance normal  Description: Skin appearance normal  7/30/2021 1034 by Thelma Harper RN  Outcome: Ongoing  7/29/2021 2305 by Monica Alvarenga RN  Outcome: Ongoing  Goal: Maintenance of adequate nutrition will improve  Description: Maintenance of adequate nutrition will improve  7/30/2021 1034 by Thelma Harper RN  Outcome: Ongoing  7/29/2021 2305 by Monica Alvarenga RN  Outcome: Ongoing  Goal: Ability to tolerate increased activity will improve  Description: Ability to tolerate increased activity will improve  7/30/2021 1034 by Thelma Harper RN  Outcome: Ongoing  7/29/2021 2305 by [facility] Pratima Bob RN  Outcome: Ongoing  Goal: Participates in care planning  Description: Participates in care planning  7/30/2021 1034 by Nathalia Savage RN  Outcome: Ongoing  7/29/2021 2305 by Bc Terrell RN  Outcome: Ongoing  Goal: Patient specific goal  Description: Patient specific goal  7/30/2021 1034 by Nathalia Savage RN  Outcome: Ongoing  7/29/2021 2305 by Bc Terrell RN  Outcome: Ongoing     Problem: Falls - Risk of:  Goal: Will remain free from falls  Description: Will remain free from falls  7/30/2021 1034 by Nathalia Savage RN  Outcome: Ongoing  7/29/2021 2305 by Bc Terrell RN  Outcome: Ongoing  Goal: Absence of physical injury  Description: Absence of physical injury  7/30/2021 1034 by Nathalia Savage RN  Outcome: Ongoing  7/29/2021 2305 by Bc Terrell RN  Outcome: Ongoing     Problem: Confusion - Acute:  Goal: Absence of continued neurological deterioration signs and symptoms  Description: Absence of continued neurological deterioration signs and symptoms  7/30/2021 1034 by Nathalia Savage RN  Outcome: Ongoing  7/29/2021 2305 by Bc Terrell RN  Outcome: Ongoing  Goal: Mental status will be restored to baseline  Description: Mental status will be restored to baseline  7/30/2021 1034 by Nathalia Savage RN  Outcome: Ongoing  7/29/2021 2305 by Bc Terrell RN  Outcome: Ongoing     Problem: Discharge Planning:  Goal: Ability to perform activities of daily living will improve  Description: Ability to perform activities of daily living will improve  7/30/2021 1034 by Nathalia Savage RN  Outcome: Ongoing  7/29/2021 2305 by Bc Terrell RN  Outcome: Ongoing  Goal: Participates in care planning  Description: Participates in care planning  7/30/2021 1034 by Nathalia Savage RN  Outcome: Ongoing  7/29/2021 2305 by Bc Terrell RN  Outcome: Ongoing     Problem: Mood - Altered:  Goal: Mood stable  Description: Mood stable  7/30/2021 1034 by Nathalia Savage RN  Outcome: Ongoing  7/29/2021 2305 by Bc Terrell RN  Outcome: Ongoing  Goal: Absence of abusive behavior  Description: Absence of abusive behavior  7/30/2021 1034 by Eligio Rosario RN  Outcome: Ongoing  7/29/2021 2305 by Sunny Nuñez RN  Outcome: Ongoing  Goal: Verbalizations of feeling emotionally comfortable while being cared for will increase  Description: Verbalizations of feeling emotionally comfortable while being cared for will increase  7/30/2021 1034 by Eligio Rosario RN  Outcome: Ongoing  7/29/2021 2305 by Sunny Nuñez RN  Outcome: Ongoing     Problem: Psychomotor Activity - Altered:  Goal: Absence of psychomotor disturbance signs and symptoms  Description: Absence of psychomotor disturbance signs and symptoms  7/30/2021 1034 by Eligio Rosario RN  Outcome: Ongoing  7/29/2021 2305 by Sunny Nuñez RN  Outcome: Ongoing     Problem: Sensory Perception - Impaired:  Goal: Demonstrations of improved sensory functioning will increase  Description: Demonstrations of improved sensory functioning will increase  7/30/2021 1034 by Eligio Rosario RN  Outcome: Ongoing  7/29/2021 2305 by Sunny Nuñez RN  Outcome: Ongoing  Goal: Decrease in sensory misperception frequency  Description: Decrease in sensory misperception frequency  7/30/2021 1034 by Eligio Rosario RN  Outcome: Ongoing  7/29/2021 2305 by Sunny Nuñez RN  Outcome: Ongoing  Goal: Able to refrain from responding to false sensory perceptions  Description: Able to refrain from responding to false sensory perceptions  7/30/2021 1034 by Eligio Rosario RN  Outcome: Ongoing  7/29/2021 2305 by Sunny Nuñez RN  Outcome: Ongoing  Goal: Demonstrates accurate environmental perceptions  Description: Demonstrates accurate environmental perceptions  7/30/2021 1034 by Eligio Rosario RN  Outcome: Ongoing  7/29/2021 2305 by Sunny Nuñez RN  Outcome: Ongoing  Goal: Able to distinguish between reality-based and nonreality-based thinking  Description: Able to distinguish between reality-based and nonreality-based thinking  7/30/2021 1034 by Ramón Haq RN  Outcome: Ongoing  7/29/2021 2305 by David Jeffers RN  Outcome: Ongoing  Goal: Able to interrupt nonreality-based thinking  Description: Able to interrupt nonreality-based thinking  7/30/2021 1034 by Ramón Haq RN  Outcome: Ongoing  7/29/2021 2305 by David Jeffers RN  Outcome: Ongoing     Problem: Sensory Perception - Impaired:  Goal: Demonstrations of improved sensory functioning will increase  Description: Demonstrations of improved sensory functioning will increase  7/30/2021 1034 by Ramón Haq RN  Outcome: Ongoing  7/29/2021 2305 by David Jeffers RN  Outcome: Ongoing  Goal: Decrease in sensory misperception frequency  Description: Decrease in sensory misperception frequency  7/30/2021 1034 by Ramón Haq RN  Outcome: Ongoing  7/29/2021 2305 by David Jeffers RN  Outcome: Ongoing  Goal: Able to refrain from responding to false sensory perceptions  Description: Able to refrain from responding to false sensory perceptions  7/30/2021 1034 by Ramón Haq RN  Outcome: Ongoing  7/29/2021 2305 by David Jeffers RN  Outcome: Ongoing  Goal: Demonstrates accurate environmental perceptions  Description: Demonstrates accurate environmental perceptions  7/30/2021 1034 by Ramón Haq RN  Outcome: Ongoing  7/29/2021 2305 by David Jeffers RN  Outcome: Ongoing  Goal: Able to distinguish between reality-based and nonreality-based thinking  Description: Able to distinguish between reality-based and nonreality-based thinking  7/30/2021 1034 by Ramón Haq RN  Outcome: Ongoing  7/29/2021 2305 by David Jeffers RN  Outcome: Ongoing  Goal: Able to interrupt nonreality-based thinking  Description: Able to interrupt nonreality-based thinking  7/30/2021 1034 by Ramón Haq RN  Outcome: Ongoing  7/29/2021 2305 by David Jeffers RN  Outcome: Ongoing     Problem: Sleep Pattern Disturbance:  Goal: Appears well-rested  Description: Appears well-rested  7/30/2021 1034 by Lisa Locus Marilou Webb RN  Outcome: Ongoing  7/29/2021 2305 by Marika Addison RN  Outcome: Ongoing

## 2021-07-30 NOTE — PLAN OF CARE
Ongoing  Goal: Maintenance of adequate nutrition will improve  Description: Maintenance of adequate nutrition will improve  7/29/2021 2305 by Nasim Trejo RN  Outcome: Ongoing  7/29/2021 1854 by Leonel Aviles RN  Outcome: Ongoing  7/29/2021 1202 by Leonel Aviles RN  Outcome: Ongoing  Goal: Ability to tolerate increased activity will improve  Description: Ability to tolerate increased activity will improve  7/29/2021 2305 by Nasim Trejo RN  Outcome: Ongoing  7/29/2021 1854 by Leonel Aviles RN  Outcome: Ongoing  7/29/2021 1202 by Leonel Aviles RN  Outcome: Ongoing  Goal: Participates in care planning  Description: Participates in care planning  7/29/2021 2305 by Nasim Trejo RN  Outcome: Ongoing  7/29/2021 1854 by Leonel Aviles RN  Outcome: Ongoing  7/29/2021 1202 by Leonel Aviles RN  Outcome: Ongoing  Goal: Patient specific goal  Description: Patient specific goal  7/29/2021 2305 by Nasim Trejo RN  Outcome: Ongoing  7/29/2021 1854 by Leonel Aviles RN  Outcome: Ongoing  7/29/2021 1202 by Leonel Aviles RN  Outcome: Ongoing     Problem: Falls - Risk of:  Goal: Will remain free from falls  Description: Will remain free from falls  7/29/2021 2305 by Nasim Trejo RN  Outcome: Ongoing  7/29/2021 1854 by Leonel Aviles RN  Outcome: Ongoing  7/29/2021 1202 by Leonel Aviles RN  Outcome: Ongoing  Goal: Absence of physical injury  Description: Absence of physical injury  7/29/2021 2305 by Nasim Trejo RN  Outcome: Ongoing  7/29/2021 1854 by Leonel Aviles RN  Outcome: Ongoing  7/29/2021 1202 by Leonel Aviles RN  Outcome: Ongoing     Problem: Confusion - Acute:  Goal: Absence of continued neurological deterioration signs and symptoms  Description: Absence of continued neurological deterioration signs and symptoms  7/29/2021 2305 by Nasim Trejo RN  Outcome: Ongoing  7/29/2021 1854 by Leonel Aviles RN  Outcome: Ongoing  7/29/2021 1202 by Leonel Aviles, Ongoing  7/29/2021 1854 by J Luis Antoine RN  Outcome: Ongoing  7/29/2021 1202 by J Luis Antoine RN  Outcome: Ongoing     Problem: Mood - Altered:  Goal: Mood stable  Description: Mood stable  7/29/2021 2305 by Bc Terrell RN  Outcome: Ongoing  7/29/2021 1854 by J Luis Antoine RN  Outcome: Ongoing  7/29/2021 1202 by J Luis Antoine RN  Outcome: Ongoing  Goal: Absence of abusive behavior  Description: Absence of abusive behavior  7/29/2021 2305 by Bc Terrell RN  Outcome: Ongoing  7/29/2021 1854 by J Luis Antoine RN  Outcome: Ongoing  7/29/2021 1202 by J Luis Antoine RN  Outcome: Ongoing  Goal: Verbalizations of feeling emotionally comfortable while being cared for will increase  Description: Verbalizations of feeling emotionally comfortable while being cared for will increase  7/29/2021 2305 by Bc Terrell RN  Outcome: Ongoing  7/29/2021 1854 by J Luis Antoine RN  Outcome: Ongoing  7/29/2021 1202 by J Luis Antoine RN  Outcome: Ongoing     Problem: Psychomotor Activity - Altered:  Goal: Absence of psychomotor disturbance signs and symptoms  Description: Absence of psychomotor disturbance signs and symptoms  7/29/2021 2305 by Bc Terrell RN  Outcome: Ongoing  7/29/2021 1854 by J Luis Antoine RN  Outcome: Ongoing  7/29/2021 1202 by J Luis Antoine RN  Outcome: Ongoing     Problem: Sensory Perception - Impaired:  Goal: Demonstrations of improved sensory functioning will increase  Description: Demonstrations of improved sensory functioning will increase  7/29/2021 2305 by Bc Terrell RN  Outcome: Ongoing  7/29/2021 1854 by J Luis Antoine RN  Outcome: Ongoing  7/29/2021 1202 by J Luis Antoine RN  Outcome: Ongoing  Goal: Decrease in sensory misperception frequency  Description: Decrease in sensory misperception frequency  7/29/2021 2305 by Bc Terrell RN  Outcome: Ongoing  7/29/2021 1854 by J Luis Antoine RN  Outcome: Ongoing  7/29/2021 1202 by J Luis Antoine RN  Outcome: Ongoing  Goal: Able to refrain from responding to false sensory perceptions  Description: Able to refrain from responding to false sensory perceptions  7/29/2021 2305 by Teddy Davis RN  Outcome: Ongoing  7/29/2021 1854 by Annie Perez RN  Outcome: Ongoing  7/29/2021 1202 by Annie Perez RN  Outcome: Ongoing  Goal: Demonstrates accurate environmental perceptions  Description: Demonstrates accurate environmental perceptions  7/29/2021 2305 by Teddy Davis RN  Outcome: Ongoing  7/29/2021 1854 by Annie Perez RN  Outcome: Ongoing  7/29/2021 1202 by Annie Perez RN  Outcome: Ongoing  Goal: Able to distinguish between reality-based and nonreality-based thinking  Description: Able to distinguish between reality-based and nonreality-based thinking  7/29/2021 2305 by Teddy Davis RN  Outcome: Ongoing  7/29/2021 1854 by Annie Perez RN  Outcome: Ongoing  7/29/2021 1202 by Annie Perez RN  Outcome: Ongoing  Goal: Able to interrupt nonreality-based thinking  Description: Able to interrupt nonreality-based thinking  7/29/2021 2305 by Teddy Davis RN  Outcome: Ongoing  7/29/2021 1854 by Annie Perez RN  Outcome: Ongoing  7/29/2021 1202 by Annie Perez RN  Outcome: Ongoing     Problem: Sleep Pattern Disturbance:  Goal: Appears well-rested  Description: Appears well-rested  7/29/2021 2305 by Teddy Davis RN  Outcome: Ongoing  7/29/2021 1854 by Annie Perez RN  Outcome: Ongoing  7/29/2021 1202 by Annie Perez RN  Outcome: Ongoing

## 2021-07-30 NOTE — PROGRESS NOTES
Physical Therapy    Facility/Department: [facility]  Initial Assessment    NAME: Lane Simpson  : 1950  MRN: [id number]    Date of Service: 2021    Discharge Recommendations:  Subacute/Skilled Nursing Facility        Assessment   Body structures, Functions, Activity limitations: Decreased high-level IADLs;Decreased functional mobility   Assessment: Patient is a 80 female admitted to [facility] for behavioral disturbance; patient would benefit from skilled PT intervention to address mobility  impairments with walking and standing balance - patient @ risk for falling when walking as she tends to reach for support while walking - patient demonstrated good potential for walking safely while using rolling walker and would benefit from further PT intervention for gait training with walker and balance training for ADL completion  Treatment Diagnosis: impaired mobility  Prognosis: Good  Decision Making: Medium Complexity  History: PF  Exam: leg strrength/ gait  Clinical Presentation: changing characteristics of function - patient reaches for support when walking  Barriers to Learning: communication- patient has diffiiculty speaking  REQUIRES PT FOLLOW UP: Yes       Patient Diagnosis(es): There were no encounter diagnoses. has a past medical history of Essential hypertension and Hyperlipidemia. has no past surgical history on file.     Restrictions  Restrictions/Precautions  Restrictions/Precautions: Fall Risk, General Precautions  Vision/Hearing        Subjective  General  Chart Reviewed: Yes  Patient assessed for rehabilitation services?: Yes  Follows Commands: Within Functional Limits  Pain Screening  Patient Currently in Pain: Denies     Pre Treatment Pain Screening  Pain at present: 0    Orientation  Orientation  Overall Orientation Status: Within Normal Limits  Social/Functional History  Social/Functional History  Lives With: Family  Home Layout: Two level  Home Access: Ramped entrance  ADL Assistance: Needs assistance  Homemaking Assistance: Needs assistance  Homemaking Responsibilities: Yes  Ambulation Assistance: Independent  Transfer Assistance: Independent  Active : No  Occupation: Unemployed  Additional Comments: History obtained from chart, Pt unable to provide  Cognition    patient appears to be oriented    Objective     Observation/Palpation  Observation: Pt awake sitting in chair in dining area    AROM RLE (degrees)  RLE AROM: WNL  AROM LLE (degrees)  LLE AROM : WNL  Strength RLE  Strength RLE: WFL  Strength LLE  Strength LLE: WFL        Bed mobility  Rolling to Left: Independent  Rolling to Right: Independent  Supine to Sit: Independent  Sit to Supine: Independent  Transfers  Sit to Stand: Modified independent  Stand to sit: Modified independent  Ambulation  Ambulation?: Yes  Ambulation 1  Surface: level tile  Device: Rolling Walker  Assistance: Contact guard assistance  Distance: 25 ft     Balance  Standing - Static: Fair;+  Standing - Dynamic: Fair;-        Plan   Plan  Times per week: 1+  Safety Devices  Type of devices: Gait belt, Left in chair, Nurse notified    G-Code       OutComes Score                                                  AM-PAC Score        Basic Mobility Six Clicks Form [facility] AM-PAC Score Conversion Table   How much difficulty does the patient currently have Unable   (pt is unable to do activity) A Lot   (activity is a struggle, requires great effort/time) A Little   (pt can manage, but takes more effort/time than should) None   (pt has no difficulty) Raw Score Standardized Score CMS -100% Score CMS Modifier        6 23.55 100% CN   Turning over in bed (including adjusting bedclothes, sheets, and blankets)? []1 []2  []3  [x]4  7 26.42 92.36% CM        8 28.58 86.62% CM   Sitting down on and standing up from a chair with arms (e.g. wheelchair, bedside commode, etc.)?  []1 []2 []3   [x]4   9 30.55 81.38% CM        10 32.29 76.75% CL Moving from lying on back to sitting on the side of the bed? []1 []2  []3   [x]4   11 33.86 72.57% CL        12 35.33 68.66% CL   How much help from another person does the patient currently need Total   (Total/Dependent Assist) A Lot   (Max/Mod Assist) A Little   (Min/CGA/Supervision) None   (No human assistance) 13 36.74 64.91% CL        14 38.1 61.29% CL   Moving to and from a bed to a chair (including a wheelchair)? []1  []2   [x]3  []4   15 39.45 57.70% CK        16 40.78 54.16% CK   To walk in a hospital room? []1 []2   [x]3    []4  17 42.13 50.57% CK        18 43.63 46.58% CK   Climbing 3-5 steps with a railing?  [x]1  []2   []3    []4  19 45.44 41.77% CK        20 47.67 35.83% CJ   Raw Score 19  21 50.25 28.97% CJ   Standardized Score   22 53.28 20.91% CJ   CMS 0-100% Score   23 56.93 11.20% CI   CMS Modifier  24 61.14 0.00% CH     CH = 0% impaired  CI = 1-20% impaired  CJ = 20-40% impaired  CK = 40-60% impaired  CL = 60-80% impaired  CM = % impaired  CN = 100% impaired          Goals  Short term goals  Time Frame for Short term goals: 1 week  Short term goal 1: patient will safely ambulate 150 ft using walkier with CGA  Short term goal 2: patient will stand safely x5' with UE support as needed while perfoming reaching and other staic balance actvities       Therapy Time   Individual Concurrent Group Co-treatment   Time In   1000       Time Out   1025       Minutes   25       Timed Code Treatment Minutes: 10 Minutes       ALMA Hampton, PT

## 2021-07-30 NOTE — PROGRESS NOTES
[facility] was watching TV during the evening, and socializing with another patient. She took all her bedtime medications whole in ice cream. She took trazadone PRN and has slept well throughout the night. She got emotional right before bed. This nurse heard yelling, and when I went to her room she was crying. I asked what was wrong and she pointed to the sink making hand gestures and saying things this nurse could not understand. I asked if she would lay down and rest and she said \"no\". I asked if she would like her hair brushed and she said yes. She is easily redirected and very pleasant based on this nurses's experience.

## 2021-07-30 NOTE — GROUP NOTE
Group Therapy Note    Date: 7/30/2021    Group Start Time: 1030  Group End Time: 1100  Group Topic: Recreational    [address] [facility]    Luis Alberto Crowley, CTRS, MA        Group Therapy Note    Attendees: 2/5       Notes:  Pts participated in recreational therapy group; Music Listening Activity. Pts were encouraged to engage in a leisure activity to promote socialization, positive mood, and coping with negative emotions. Pt encouraged to attend, pt refused.        Discipline Responsible: Certified Therapeutic Recreation Specialist       Signature:  Luis Alberto Crowley, [address] [place] [address]

## 2021-07-30 NOTE — PROGRESS NOTES
General Observations: Pt appears to be Cooperative and Pleasant during the shift today Pt has been compliant with medication and denies side effects     Amount of sleep: 9 hours    Appetite: normal    Suicidal Ideations: No    Homicidal Ideations: No    Hallucinations:  absent -      Delusions:  absent -      Patient denied depression or anxiety when speaking to me, when questions were again asked by Charlene miranda therapist she states depression and anxiety are bad, denies SSI and hallucinations, she is sleeping well and has excellent appetite.         Liset Rowe RN

## 2021-07-30 NOTE — GROUP NOTE
Group Therapy Note    Date: 7/30/2021    Group Start Time: 3483  Group End Time: 1600  Group Topic: Healthy Living/Wellness    [address], CTRS        Group Therapy Note    Attendees: 4/4       Notes:  Pt attended group, but refused to participate. Pt did not respond to prompting/encouragment to participate.      Status After Intervention:  Unchanged    Participation Level: None    Participation Quality: Resistant      Speech:  pressured      Thought Process/Content: Logical      Affective Functioning: Blunted      Mood: Blunted      Level of consciousness:  Inattentive      Response to Learning: Resistant      Endings: None Reported    Modes of Intervention: Activity and Movement      Discipline Responsible: Certified Therapeutic Recreation Specialist       Signature:  Sukhdev Arriaza, [address] [place] [address] Unknown

## 2021-07-30 NOTE — PROGRESS NOTES
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 PSYCHIATRIC ASSESSMENT / MENTAL STATUS EXAM:   Vitals: Blood pressure (!) 148/47, pulse 72, temperature 97.8 °F (36.6 °C), temperature source Temporal, resp. rate 18, height 5' 2\" (1.575 m), weight 142 lb (64.4 kg), SpO2 92 %. CONSTITUTIONAL:    Appearance: appears stated age. alert and oriented to person, place. no acute distress. Adequate grooming and hygeine. Good eye contact. No prominent physical abnormalities. Attitude: Manner is cooperative and pleasant  Motor: No psychomotor agitation, retardation or abnormal movements noted  Speech: Clearly articulated; normal rate, volume, tone & amount. Language: intact understanding and production  Mood: depressed  Affect: euthymic, full range, non-labile, congruent with mood and content of speech  Thought Production: Spontaneous. Thought Form: linear at times yet Noted tangentiality and circumstantiality with loosening of associations. Thought Content/Perceptions: No NETTIE, noted both AVH, noted delusion  Insight: poor  Judgment: poor  Memory: Immediate, recent, and remote appear impaired, though not formally tested.   Attention: maintained throughout interview  Fund of knowledge: Average  Gait/Balance: WNL/WNL         IMPRESSION:   Neurocognitive D/O Alzheimer type with behavioral disturba    Tardive Dyskineasa   Al movements    PLAN:  Psychiatric management:medication initiation and titration, group and individual therapy, safe and theraputic environment. Status of problem/condition: ?Improving  Medical co-morbidities: [facility], appreciate assistance  Legal Status:Pending  Disposition:estimated LOS: Pending. The treatment team reviewed with the patient the diagnosis and treatment recommendations to include the risks, benefits, and side effects of chosen medications. The patient verbalized understanding and agreed with the treatment regimen as outlined above. The patient was encouraged to participate in groups. Medical records, Labs, Diagnotic tests reviewed  q15 min safety checks for safety  Interval History. Review current labs  Continue current medications Pending ingrezza  Supportive Therapy Provided  Pt had an opportunity to ask questions and address concerns  Pt encouraged to continue therapy group or individual.  Pt was in agreement with treatment plan. The risks benefits and side effects of medications were discussed with the patient, including alternatives and no treatment.     Electronically signed by GURDEEP Gomez CNP on 7/30/2021 at 10:57 AM

## 2021-07-30 NOTE — GROUP NOTE
Group Therapy Note    Date: 7/30/2021    Group Start Time: 0200  Group End Time: 0230  Group Topic: Psychotherapy    [address], MSW        Group Therapy Note    Attendees: 2/4         Patient's Goal:  Socialization    Notes:  SW sat for one-on-ones with pts. Pt is still struggling to communicate and is often more difficult to understand. SW sat with pt, asked questions, intentionally listened, and make eye contact even though it is near impossible to understand pt. SW asked and got pt a cup of water since she was struggling to talk so much. SW is concerned that pt is missing out on socializing opportunities due to limited ability to communicate with others. SW will continue to intentionally sit with pt and invite pt to groups in order to help foster some level of socializing.      Signature:  RUBIN Cruz

## 2021-07-30 NOTE — PROGRESS NOTES
Occupational Therapy   Occupational Therapy Initial Assessment  Date: 2021   Patient Name: Zelpha Habermann  MRN: [id number]     : 1950    Date of Service: 2021    Discharge Recommendations:  [address]  OT Equipment Recommendations  Equipment Needed: No    Assessment   Performance deficits / Impairments: Decreased functional mobility ; Decreased ADL status; Decreased safe awareness;Decreased balance;Decreased cognition;Decreased high-level IADLs  Assessment: Pt is a plesant 79 yo female with hx of Alzheimer's and presents with the following deficits. She would benefit from skilled OT to address safety to perform functional mobility and maximize indep with ADLs  Treatment Diagnosis: Decreased ADL status  Prognosis: Good  Decision Making: Medium Complexity  OT Education: OT Role;Transfer Training;Plan of Care  Barriers to Learning: Hx of Alzheimers  REQUIRES OT FOLLOW UP: Yes  Activity Tolerance  Activity Tolerance: Patient Tolerated treatment well  Safety Devices  Safety Devices in place: Yes  Type of devices: All fall risk precautions in place;Gait belt;Patient at risk for falls; Left in chair;Nurse notified  Restraints  Initially in place: No           Patient Diagnosis(es): There were no encounter diagnoses. has a past medical history of Essential hypertension and Hyperlipidemia. has no past surgical history on file.     Treatment Diagnosis: Decreased ADL status      Restrictions  Restrictions/Precautions  Restrictions/Precautions: Fall Risk, General Precautions    Subjective   General  Patient assessed for rehabilitation services?: Yes  Family / Caregiver Present: No  Subjective  Subjective: \"I am feeling good, better today then yesterday\"  General Comment  Comments: Pt presents awake sitting in chair in dining area  Patient Currently in Pain: Denies  Vital Signs  Patient Currently in Pain: Denies  Social/Functional History  Social/Functional History  Lives With: met  Short term goal 1: Pt will demo safe trfs/functional mobility mod I  Short term goal 2: Pt will complete UE bathing/dressing indep with min prompts to initiate  Short term goal 3: Pt will complete LE bathing/dressing indep with min prompts to initiate  Short term goal 4: Pt will demo 5+ min of fuctional mobility for ADLs/therax w/o LOB  Short term goal 5: Pt will complete toileting mod I       Therapy Time   Individual Concurrent Group Co-treatment   Time In       1000   Time Out       1023   Minutes       23   Timed Code Treatment Minutes: 15 Minutes       Eve Lang, OTR/L [id number]

## 2021-07-30 NOTE — GROUP NOTE
Group Therapy Note    Date: 7/30/2021    Group Start Time: 0830  Group End Time: 0900    Number of Participants: 4/5    Type: Morning Goals Group/ Community Meeting    Group Topic/Objective: Set Goal For The Day and to review Unit Rules and Regulations. Patient's Goal:  Pt refused to state a goal.     Notes:  Pt minimally engaged in group. Pt states she is feeling \"terrible\" and refused to state anything she is grateful for. Pt reports restless sleep of ~2 hours. Depression (0-10): Pt states \"a lot\"    Anxiety (0-10): Pt states \"a little. \"    Irritability/Aggitation (0-10): 0    Status After Intervention:  Unchanged    Participation Level: Minimal    Participation Quality: Resistant    Speech:  pressured    Thought Process/Content: Confused    Affective Functioning: Blunted    Mood: Blunted    Level of consciousness:  Preoccupied    Response to Learning: Able to verbalize current knowledge/experience    Endings: None Reported    Modes of Intervention: Education, Support and Socialization    Discipline Responsible: Certified Therapeutic Recreation Specialist     Electronically signed by Magdalena Urrutia MA on 7/30/2021 at 9:40 AM

## 2021-07-31 LAB
AMMONIA: 13 UMOL/L (ref 11–51)
ANION GAP SERPL CALCULATED.3IONS-SCNC: 6 MMOL/L (ref 4–16)
BUN BLDV-MCNC: 28 MG/DL (ref 6–23)
CALCIUM SERPL-MCNC: 9.8 MG/DL (ref 8.3–10.6)
CHLORIDE BLD-SCNC: 101 MMOL/L (ref 99–110)
CO2: 32 MMOL/L (ref 21–32)
CREAT SERPL-MCNC: 1.1 MG/DL (ref 0.6–1.1)
FOLATE: 3.8 NG/ML (ref 3.1–17.5)
GFR AFRICAN AMERICAN: 59 ML/MIN/1.73M2
GFR NON-AFRICAN AMERICAN: 49 ML/MIN/1.73M2
GLUCOSE BLD-MCNC: 89 MG/DL (ref 70–99)
POTASSIUM SERPL-SCNC: 4.3 MMOL/L (ref 3.5–5.1)
SODIUM BLD-SCNC: 139 MMOL/L (ref 135–145)
VITAMIN B-12: 664.3 PG/ML (ref 211–911)
VITAMIN D 25-HYDROXY: 32.1 NG/ML

## 2021-07-31 PROCEDURE — 82607 VITAMIN B-12: CPT

## 2021-07-31 PROCEDURE — 36415 COLL VENOUS BLD VENIPUNCTURE: CPT

## 2021-07-31 PROCEDURE — 82746 ASSAY OF FOLIC ACID SERUM: CPT

## 2021-07-31 PROCEDURE — 6370000000 HC RX 637 (ALT 250 FOR IP): Performed by: NURSE PRACTITIONER

## 2021-07-31 PROCEDURE — 99233 SBSQ HOSP IP/OBS HIGH 50: CPT | Performed by: NURSE PRACTITIONER

## 2021-07-31 PROCEDURE — 6370000000 HC RX 637 (ALT 250 FOR IP): Performed by: PHYSICIAN ASSISTANT

## 2021-07-31 PROCEDURE — 80048 BASIC METABOLIC PNL TOTAL CA: CPT

## 2021-07-31 PROCEDURE — 1240000000 HC EMOTIONAL WELLNESS R&B

## 2021-07-31 PROCEDURE — 82306 VITAMIN D 25 HYDROXY: CPT

## 2021-07-31 PROCEDURE — 6370000000 HC RX 637 (ALT 250 FOR IP): Performed by: PSYCHIATRY & NEUROLOGY

## 2021-07-31 PROCEDURE — 82140 ASSAY OF AMMONIA: CPT

## 2021-07-31 RX ADMIN — OXYBUTYNIN CHLORIDE 5 MG: 5 TABLET, EXTENDED RELEASE ORAL at 08:25

## 2021-07-31 RX ADMIN — CHOLECALCIFEROL TAB 125 MCG (5000 UNIT) 5000 UNITS: 125 TAB at 08:25

## 2021-07-31 RX ADMIN — AMLODIPINE BESYLATE 5 MG: 5 TABLET ORAL at 08:25

## 2021-07-31 RX ADMIN — DEUTETRABENAZINE 6 MG: 6 TABLET, COATED ORAL at 16:48

## 2021-07-31 RX ADMIN — BENZTROPINE MESYLATE 1 MG: 1 TABLET ORAL at 08:25

## 2021-07-31 RX ADMIN — OLANZAPINE 5 MG: 5 TABLET, FILM COATED ORAL at 20:46

## 2021-07-31 RX ADMIN — LOSARTAN POTASSIUM 100 MG: 100 TABLET, FILM COATED ORAL at 08:26

## 2021-07-31 RX ADMIN — POTASSIUM CHLORIDE 10 MEQ: 10 TABLET, EXTENDED RELEASE ORAL at 08:27

## 2021-07-31 RX ADMIN — BENZTROPINE MESYLATE 1 MG: 1 TABLET ORAL at 20:46

## 2021-07-31 RX ADMIN — DEUTETRABENAZINE 6 MG: 6 TABLET, COATED ORAL at 08:26

## 2021-07-31 RX ADMIN — TRAZODONE HYDROCHLORIDE 50 MG: 50 TABLET ORAL at 23:47

## 2021-07-31 RX ADMIN — ATORVASTATIN CALCIUM 20 MG: 20 TABLET, FILM COATED ORAL at 08:25

## 2021-07-31 RX ADMIN — DIVALPROEX SODIUM 500 MG: 125 CAPSULE ORAL at 08:25

## 2021-07-31 RX ADMIN — LEVOTHYROXINE SODIUM 25 MCG: 25 TABLET ORAL at 06:19

## 2021-07-31 ASSESSMENT — PAIN SCALES - GENERAL
PAINLEVEL_OUTOF10: 0
PAINLEVEL_OUTOF10: 0

## 2021-07-31 NOTE — PLAN OF CARE
Problem: Skin Integrity:  Goal: Will show no infection signs and symptoms  Description: Will show no infection signs and symptoms  7/30/2021 2143 by Bismark Wynn RN  Outcome: Ongoing  7/30/2021 1034 by Juan Tucker RN  Outcome: Ongoing  Goal: Absence of new skin breakdown  Description: Absence of new skin breakdown  7/30/2021 2143 by Bismark Wynn RN  Outcome: Ongoing  7/30/2021 1034 by Juan Tucker RN  Outcome: Ongoing     Problem: Altered Mood, Depressive Behavior:  Goal: Absence of self-harm  Description: Absence of self-harm  7/30/2021 2143 by Bismark Wynn RN  Outcome: Ongoing  7/30/2021 1034 by Juan Tucker RN  Outcome: Ongoing  Goal: Patient specific goal  Description: Patient specific goal  7/30/2021 2143 by Bismark Wynn RN  Outcome: Ongoing  7/30/2021 1034 by Juan Tucker RN  Outcome: Ongoing     Problem: Altered Mood, Deterioration in Function:  Goal: Ability to perform activities of daily living will improve  Description: Ability to perform activities of daily living will improve  7/30/2021 2143 by Bismark Wynn RN  Outcome: Ongoing  7/30/2021 1034 by Juan Tucker RN  Outcome: Ongoing  Goal: Able to verbalize reality based thinking  Description: Able to verbalize reality based thinking  7/30/2021 2143 by Bismark Wynn RN  Outcome: Ongoing  7/30/2021 1034 by Juan Tucker RN  Outcome: Ongoing  Goal: Skin appearance normal  Description: Skin appearance normal  7/30/2021 2143 by Bismark Wynn RN  Outcome: Ongoing  7/30/2021 1034 by Juan Tucker RN  Outcome: Ongoing  Goal: Maintenance of adequate nutrition will improve  Description: Maintenance of adequate nutrition will improve  7/30/2021 2143 by Bismark Wynn RN  Outcome: Ongoing  7/30/2021 1034 by Juan Tucker RN  Outcome: Ongoing  Goal: Ability to tolerate increased activity will improve  Description: Ability to tolerate increased activity will improve  7/30/2021 2143 by Bismark Wynn RN  Outcome: Ongoing  7/30/2021 9850 1142 by Demetrius Gloria RN  Outcome: Ongoing  Goal: Participates in care planning  Description: Participates in care planning  7/30/2021 2143 by Mariluz Aguilar RN  Outcome: Ongoing  7/30/2021 1034 by Demetrius Gloria RN  Outcome: Ongoing  Goal: Patient specific goal  Description: Patient specific goal  7/30/2021 2143 by Mariluz Aguilar RN  Outcome: Ongoing  7/30/2021 1034 by Demetrius Gloria RN  Outcome: Ongoing     Problem: Falls - Risk of:  Goal: Will remain free from falls  Description: Will remain free from falls  7/30/2021 2143 by Mariluz Aguilar RN  Outcome: Ongoing  7/30/2021 1034 by Demetrius Gloria RN  Outcome: Ongoing  Goal: Absence of physical injury  Description: Absence of physical injury  7/30/2021 2143 by Mariluz Aguilar RN  Outcome: Ongoing  7/30/2021 1034 by Demetrius Gloria RN  Outcome: Ongoing     Problem: Confusion - Acute:  Goal: Absence of continued neurological deterioration signs and symptoms  Description: Absence of continued neurological deterioration signs and symptoms  7/30/2021 2143 by Mariluz Aguilar RN  Outcome: Ongoing  7/30/2021 1034 by Demetrius Gloria RN  Outcome: Ongoing  Goal: Mental status will be restored to baseline  Description: Mental status will be restored to baseline  7/30/2021 2143 by Mariluz Aguilar RN  Outcome: Ongoing  7/30/2021 1034 by Demetrius Gloria RN  Outcome: Ongoing     Problem: Discharge Planning:  Goal: Ability to perform activities of daily living will improve  Description: Ability to perform activities of daily living will improve  7/30/2021 2143 by Mariluz Aguilar RN  Outcome: Ongoing  7/30/2021 1034 by Demetrius Gloria RN  Outcome: Ongoing  Goal: Participates in care planning  Description: Participates in care planning  7/30/2021 2143 by Mariluz Aguilar RN  Outcome: Ongoing  7/30/2021 1034 by Demetrius Gloria RN  Outcome: Ongoing     Problem: Injury - Risk of, Physical Injury:  Goal: Will remain free from falls  Description: Will remain free from falls  7/30/2021 2143 by Shawn Gonzales RN  Outcome: Ongoing  7/30/2021 1034 by Darnell Fung RN  Outcome: Ongoing  Goal: Absence of physical injury  Description: Absence of physical injury  7/30/2021 2143 by Shawn Gonzales RN  Outcome: Ongoing  7/30/2021 1034 by Darnell Fung RN  Outcome: Ongoing     Problem: Mood - Altered:  Goal: Mood stable  Description: Mood stable  7/30/2021 2143 by Shawn Gonzales RN  Outcome: Ongoing  7/30/2021 1034 by Darnell Fung RN  Outcome: Ongoing  Goal: Absence of abusive behavior  Description: Absence of abusive behavior  7/30/2021 2143 by Shawn Gonzales RN  Outcome: Ongoing  7/30/2021 1034 by Darnell Fung RN  Outcome: Ongoing  Goal: Verbalizations of feeling emotionally comfortable while being cared for will increase  Description: Verbalizations of feeling emotionally comfortable while being cared for will increase  7/30/2021 2143 by Shawn Gonzales RN  Outcome: Ongoing  7/30/2021 1034 by Darnell Fung RN  Outcome: Ongoing     Problem: Psychomotor Activity - Altered:  Goal: Absence of psychomotor disturbance signs and symptoms  Description: Absence of psychomotor disturbance signs and symptoms  7/30/2021 2143 by Shawn Gonzales RN  Outcome: Ongoing  7/30/2021 1034 by Darnell Fung RN  Outcome: Ongoing     Problem: Sensory Perception - Impaired:  Goal: Demonstrations of improved sensory functioning will increase  Description: Demonstrations of improved sensory functioning will increase  7/30/2021 2143 by Shawn Gonzales RN  Outcome: Ongoing  7/30/2021 1034 by Darnell Fung RN  Outcome: Ongoing  Goal: Decrease in sensory misperception frequency  Description: Decrease in sensory misperception frequency  7/30/2021 2143 by Shawn Gonzales RN  Outcome: Ongoing  7/30/2021 1034 by Darnell Fung RN  Outcome: Ongoing  Goal: Able to refrain from responding to false sensory perceptions  Description: Able to refrain from responding to false sensory perceptions  7/30/2021 2143 by Shawn Gonzales RN  Outcome: Ongoing  7/30/2021 1034 by Agnes Goldberg RN  Outcome: Ongoing  Goal: Demonstrates accurate environmental perceptions  Description: Demonstrates accurate environmental perceptions  7/30/2021 2143 by Abraham Cottrell RN  Outcome: Ongoing  7/30/2021 1034 by Agnes Goldberg RN  Outcome: Ongoing  Goal: Able to distinguish between reality-based and nonreality-based thinking  Description: Able to distinguish between reality-based and nonreality-based thinking  7/30/2021 2143 by Abraham Cottrell RN  Outcome: Ongoing  7/30/2021 1034 by Agnes Goldberg RN  Outcome: Ongoing  Goal: Able to interrupt nonreality-based thinking  Description: Able to interrupt nonreality-based thinking  7/30/2021 2143 by Abraham Cottrell RN  Outcome: Ongoing  7/30/2021 1034 by Agnes Goldberg RN  Outcome: Ongoing     Problem: Sleep Pattern Disturbance:  Goal: Appears well-rested  Description: Appears well-rested  7/30/2021 2143 by Abraham Cottrell RN  Outcome: Ongoing  7/30/2021 1034 by Agnes Goldberg RN  Outcome: Ongoing

## 2021-07-31 NOTE — GROUP NOTE
Group Therapy Note    Date: 7/31/2021    Group Start Time: 0820  Group End Time: 0840      Number of Participants: 3/5    Type: Morning Goals Group/ Community Meeting    Group Topic/Objective: Set Goal For The Day and to review Unit Rules and Regulations. Patient's Goal:  Pt states her goal is \"Take a shower. \"    Notes:  Pt states she is feeling \"ok\" and is grateful for \"my family. \"  Pt reports restful sleep, but unsure how long she slept. Depression (0-10): 0    Anxiety (0-10): 0    Irritability/Aggitation (0-10): 0    Status After Intervention:  Improved    Participation Level:  Active Listener and Interactive    Participation Quality: Appropriate, Attentive and Sharing    Speech:  pressured    Thought Process/Content: Logical    Affective Functioning: Blunted    Mood: Blunted    Level of consciousness:  Alert and Attentive    Response to Learning: Able to verbalize current knowledge/experience    Endings: None Reported    Modes of Intervention: Education, Support and Socialization    Discipline Responsible: Certified Therapeutic Recreation Specialist     Electronically signed by Artem Perez MA on 7/31/2021 at 8:47 AM

## 2021-07-31 NOTE — PROGRESS NOTES
Attempted 1:1 session. Pt refused. Pt did not respond to prompting/encouragement to participate.      Electronically signed by SWATI Loo MA on 7/31/2021 at 2:48 PM

## 2021-07-31 NOTE — GROUP NOTE
Group Therapy Note    Date: 7/31/2021    Group Start Time: 2247  Group End Time: 1600    Number of Participants: 3/4    Type: Exercise/Recreation Group    Group Topic/Objective: Chair Exercises    Notes:  Pt completed approximately 25% of the exercises given. Pt did not respond to prompting/encouragment to participate.      Status After Intervention:  Improved    Participation Level: Minimal    Participation Quality: Resistant    Speech:  pressured    Thought Process/Content: Logical    Affective Functioning: Blunted    Mood: Blunted    Level of consciousness:  Alert    Response to Learning: Resistant    Endings: None Reported    Modes of Intervention: Activity and Movement    Discipline Responsible: Certified Therapeutic Recreation Specialist     Electronically signed by SWATI aPgan MA on 8/1/2021 at 10:36 AM

## 2021-07-31 NOTE — BH NOTE
Updated sisters Rupal Handleyrhea and Frances Michael after their visit with patient. Discussed possibility of patient needing skilled care, both sisters state they would like her to continue living at home.

## 2021-07-31 NOTE — BH NOTE
General Observations: Pt resting/sleeping in bed at the beginning of this shift. Calm,  Cooperative, and plaesant with assessment. Medication compliant whole in vanilla pudding.         Appetite: normal for pt. Pt declined any snacks this evening.      Suicidal Ideations: No     Homicidal Ideations: No     Hallucinations:  none reported or observed      Delusions:  none reported or observed        Pt alert to self only. Call light within reach. Pt's safety maintained. Will continue to monitor.

## 2021-07-31 NOTE — PROGRESS NOTES
Psychiatric Progress Note    Son Brown  [id number]  07/31/21    CHIEF COMPLAINT: I get upset    HPI: Son Brown is a 81 yo  female who presents for exacerbation of Neurocognitive D/O Alzheimer type with behavioral disturbance. Pt noted recent exacerbation of mood and confusion with agitation. Pt noted she currently feels safe and comfortable on the unit. Pt was in agreement with treatment team.  Pt was polite and cordial during the interview process. 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 Pt noted she is doing [place] today. \"  Pt noted she is sleeping \"okay. ..last night. \"  Per staff she slept 10 hours. Pt noted her appetite is \"fine. \" She denies SI/HI and AV hallucinations. When asked about depression and anxiety, patient did not respond, but just laughed. Bedside RN noted that patient is less labile. Patient was polite and cooperative throughout the interview. Spoke with Pharmacist Geovany Schumacher, who states patient's insurance will cover Darya Bob for tardive dyskinesia. It is expected to arrive tomorrow.  Until then will use [facility].       Allergies   Allergen Reactions    Penicillins Hives    Sulfa Antibiotics Other (See Comments)     Reaction unknown       Medications Prior to Admission: amLODIPine (NORVASC) 5 MG tablet, Take 5 mg by mouth daily  vitamin D3 (CHOLECALCIFEROL) 125 MCG (5000 UT) TABS tablet, Take 50,000 Units by mouth daily  fenofibrate (TRIGLIDE) 160 MG tablet, Take 160 mg by mouth daily  levothyroxine (SYNTHROID) 25 MCG tablet, Take 25 mcg by mouth daily  losartan (COZAAR) 100 MG tablet, Take 100 mg by mouth daily  oxybutynin (DITROPAN) 5 MG tablet, Take 5 mg by mouth daily  potassium chloride (KLOR-CON) 10 MEQ extended release tablet, Take 10 mEq by mouth daily  simvastatin (ZOCOR) 40 MG tablet, Take 40 mg by mouth daily  venlafaxine (EFFEXOR XR) 75 MG extended release capsule, Take 75 mg by mouth daily    Past Medical History:   Diagnosis Date    Essential hypertension     Hyperlipidemia         Patient Active Problem List   Diagnosis    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 (HCC)    Hypothyroidism due to acquired atrophy of thyroid    Essential hypertension    Overactive bladder    Hypokalemia    Other hyperlipidemia       Review of Systems    OBJECTIVE  Vital Signs:  Vitals:    07/31/21 0715   BP: (!) 157/74   Pulse: 92   Resp: 16   Temp: 98.2 °F (36.8 °C)   SpO2: 92%       Labs:  Recent Results (from the past 48 hour(s))   Basic metabolic panel    Collection Time: 07/31/21  6:00 AM   Result Value Ref Range    Sodium 139 135 - 145 MMOL/L    Potassium 4.3 3.5 - 5.1 MMOL/L    Chloride 101 99 - 110 mMol/L    CO2 32 21 - 32 MMOL/L    Anion Gap 6 4 - 16    BUN 28 (H) 6 - 23 MG/DL    CREATININE 1.1 0.6 - 1.1 MG/DL    Glucose 89 70 - 99 MG/DL    Calcium 9.8 8.3 - 10.6 MG/DL    GFR Non- 49 (L) >60 mL/min/1.73m2    GFR  59 (L) >60 mL/min/1.73m2   Vitamin D 25 hydroxy    Collection Time: 07/31/21  6:00 AM   Result Value Ref Range    Vit D, 25-Hydroxy 32.10 >20 NG/ML   Vitamin B12 & folate    Collection Time: 07/31/21  6:00 AM   Result Value Ref Range    Vitamin B-12 664.3 211 - 911 pg/ml    Folate 3.8 3.1 - 17.5 NG/ML   Ammonia    Collection Time: 07/31/21  8:35 AM   Result Value Ref Range    Ammonia 13 11 - 51 UMOL/L       PSYCHIATRIC ASSESSMENT / MENTAL STATUS EXAM:   Vitals: Blood pressure (!) 157/74, pulse 92, temperature 98.2 °F (36.8 °C), temperature source Temporal, resp. rate 16, height 5' 2\" (1.575 m), weight 142 lb (64.4 kg), SpO2 92 %. CONSTITUTIONAL:    Appearance: appears stated age. alert and oriented to person, place. no acute distress. Adequate grooming and hygiene. Good eye contact. No prominent physical abnormalities. Attitude:  Manner is cooperative and pleasant  Motor: No psychomotor agitation, retardation or abnormal movements noted  Speech: Clearly articulated; normal rate, volume, tone & amount. Language: intact understanding and production  Mood: depressed  Affect: euthymic, full range, non-labile, congruent with mood and content of speech  Thought Production: Spontaneous. Thought Form: linear at times yet Noted tangentiality and circumstantiality with loosening of associations. Thought Content/Perceptions: No NETTIE, noted both AVH, noted delusion  Insight: poor  Judgment: poor  Memory: Immediate, recent, and remote appear impaired, though not formally tested. Attention: maintained throughout interview  Fund of knowledge: Average  Gait/Balance: WNL/WNL    IMPRESSION:   Neurocognitive D/O Alzheimer type with behavioral disturbance    Tardive Dyskinesia   [place] movements    PLAN:  Psychiatric management:medication initiation and titration, group and individual therapy, safe and therapeutic environment. Status of problem/condition: Improving  Medical co-morbidities: Management per [facility] group, appreciate assistance  Legal Status:Pending  Disposition:estimated LOS: Pending. The treatment team reviewed with the patient the diagnosis and treatment recommendations to include the risks, benefits, and side effects of chosen medications. The patient verbalized understanding and agreed with the treatment regimen as outlined above. The patient was encouraged to participate in groups. Medical records, Labs, Diagnotic tests reviewed  q15 min safety checks for safety  Interval History  Review current labs - check lipid panel in am  Continue current medications Pending Ingrezza  Supportive Therapy Provided  Pt had an opportunity to ask questions and address concerns  Pt encouraged to continue therapy group or individual.  Pt was in agreement with treatment plan.   The risks benefits and side effects of medications were discussed with the patient, including alternatives and no treatment.     Electronically signed by GURDEEP Sadler CNP on 7/31/2021 at 3:21 PM

## 2021-07-31 NOTE — BH NOTE
Patient alert to name only, calm and cooperative. Ambulates on unit with walker independently. Medications taken whole in pudding without difficulty. Labile mood, refused breakfast but agreed to cereal bars after. Denies SI/AVH/HI. Has tardive dyskinesia, difficult to understand at times. Denies pain or discomfort. Safety maintained.

## 2021-07-31 NOTE — PLAN OF CARE
Problem: Skin Integrity:  Goal: Will show no infection signs and symptoms  Description: Will show no infection signs and symptoms  7/31/2021 1037 by Griselda Hush, RN  Outcome: Ongoing  7/30/2021 2143 by Maricarmen Mary RN  Outcome: Ongoing  Goal: Absence of new skin breakdown  Description: Absence of new skin breakdown  7/31/2021 1037 by Griselda Hush, RN  Outcome: Ongoing  7/30/2021 2143 by Maricarmen Mary RN  Outcome: Ongoing     Problem: Altered Mood, Depressive Behavior:  Goal: Absence of self-harm  Description: Absence of self-harm  7/31/2021 1037 by Griselda Hush, RN  Outcome: Ongoing  7/30/2021 2143 by Maricarmen Mary RN  Outcome: Ongoing  Goal: Patient specific goal  Description: Patient specific goal  7/31/2021 1037 by Griselda Hush, RN  Outcome: Ongoing  7/30/2021 2143 by Maricarmen Mary RN  Outcome: Ongoing     Problem: Altered Mood, Deterioration in Function:  Goal: Ability to perform activities of daily living will improve  Description: Ability to perform activities of daily living will improve  7/31/2021 1037 by Griselda Hush, RN  Outcome: Ongoing  7/30/2021 2143 by Maricarmen Mary RN  Outcome: Ongoing  Goal: Able to verbalize reality based thinking  Description: Able to verbalize reality based thinking  7/31/2021 1037 by Griselda Hush, RN  Outcome: Ongoing  7/30/2021 2143 by Maricarmen Mary RN  Outcome: Ongoing  Goal: Skin appearance normal  Description: Skin appearance normal  7/31/2021 1037 by Griselda Hush, RN  Outcome: Ongoing  7/30/2021 2143 by Maricarmen Mary RN  Outcome: Ongoing  Goal: Maintenance of adequate nutrition will improve  Description: Maintenance of adequate nutrition will improve  7/31/2021 1037 by Griselda Hush, RN  Outcome: Ongoing  7/30/2021 2143 by Maricarmen Mary RN  Outcome: Ongoing  Goal: Ability to tolerate increased activity will improve  Description: Ability to tolerate increased activity will improve  7/31/2021 1037 by Griselda Hush, RN  Outcome: Ongoing  7/30/2021 2143 by Domo Wyatt RN  Outcome: Ongoing  Goal: Participates in care planning  Description: Participates in care planning  7/31/2021 1037 by Tyra Goodman RN  Outcome: Ongoing  7/30/2021 2143 by Domo Wyatt RN  Outcome: Ongoing  Goal: Patient specific goal  Description: Patient specific goal  7/31/2021 1037 by Tyra Goodman RN  Outcome: Ongoing  7/30/2021 2143 by Domo Wyatt RN  Outcome: Ongoing     Problem: Falls - Risk of:  Goal: Will remain free from falls  Description: Will remain free from falls  7/31/2021 1037 by Tyra Goodman RN  Outcome: Ongoing  7/30/2021 2143 by Domo Wyatt RN  Outcome: Ongoing  Goal: Absence of physical injury  Description: Absence of physical injury  7/31/2021 1037 by Tyra Goodman RN  Outcome: Ongoing  7/30/2021 2143 by Domo Wyatt RN  Outcome: Ongoing     Problem: Confusion - Acute:  Goal: Absence of continued neurological deterioration signs and symptoms  Description: Absence of continued neurological deterioration signs and symptoms  7/31/2021 1037 by Tyra Goodman RN  Outcome: Ongoing  7/30/2021 2143 by Domo Wyatt RN  Outcome: Ongoing  Goal: Mental status will be restored to baseline  Description: Mental status will be restored to baseline  7/31/2021 1037 by Tyra Goodman RN  Outcome: Ongoing  7/30/2021 2143 by Domo Wyatt RN  Outcome: Ongoing     Problem: Discharge Planning:  Goal: Ability to perform activities of daily living will improve  Description: Ability to perform activities of daily living will improve  7/31/2021 1037 by Tyra Goodman RN  Outcome: Ongoing  7/30/2021 2143 by Domo Wyatt RN  Outcome: Ongoing  Goal: Participates in care planning  Description: Participates in care planning  7/31/2021 1037 by Tyra Goodman RN  Outcome: Ongoing  7/30/2021 2143 by Domo Wyatt RN  Outcome: Ongoing     Problem: Injury - Risk of, Physical Injury:  Goal: Will remain free from falls  Description: Will remain free from falls  7/31/2021 1037 by Jose Luis Jose RN  Outcome: Ongoing  7/30/2021 2143 by Shawn Gonzales RN  Outcome: Ongoing  Goal: Absence of physical injury  Description: Absence of physical injury  7/31/2021 1037 by Jose Luis Jose RN  Outcome: Ongoing  7/30/2021 2143 by Shawn Gonzales RN  Outcome: Ongoing

## 2021-08-01 LAB
CHOLESTEROL: 145 MG/DL
HDLC SERPL-MCNC: 35 MG/DL
LDL CHOLESTEROL DIRECT: 80 MG/DL
TRIGL SERPL-MCNC: 171 MG/DL

## 2021-08-01 PROCEDURE — 83721 ASSAY OF BLOOD LIPOPROTEIN: CPT

## 2021-08-01 PROCEDURE — 99233 SBSQ HOSP IP/OBS HIGH 50: CPT | Performed by: NURSE PRACTITIONER

## 2021-08-01 PROCEDURE — 36415 COLL VENOUS BLD VENIPUNCTURE: CPT

## 2021-08-01 PROCEDURE — 1240000000 HC EMOTIONAL WELLNESS R&B

## 2021-08-01 PROCEDURE — 6370000000 HC RX 637 (ALT 250 FOR IP): Performed by: PSYCHIATRY & NEUROLOGY

## 2021-08-01 PROCEDURE — 80061 LIPID PANEL: CPT

## 2021-08-01 PROCEDURE — 6370000000 HC RX 637 (ALT 250 FOR IP): Performed by: PHYSICIAN ASSISTANT

## 2021-08-01 RX ADMIN — BENZTROPINE MESYLATE 1 MG: 1 TABLET ORAL at 08:32

## 2021-08-01 RX ADMIN — ATORVASTATIN CALCIUM 20 MG: 20 TABLET, FILM COATED ORAL at 08:32

## 2021-08-01 RX ADMIN — LEVOTHYROXINE SODIUM 25 MCG: 25 TABLET ORAL at 08:09

## 2021-08-01 RX ADMIN — DIVALPROEX SODIUM 500 MG: 125 CAPSULE ORAL at 08:32

## 2021-08-01 RX ADMIN — OXYBUTYNIN CHLORIDE 5 MG: 5 TABLET, EXTENDED RELEASE ORAL at 08:32

## 2021-08-01 RX ADMIN — OLANZAPINE 5 MG: 5 TABLET, FILM COATED ORAL at 20:46

## 2021-08-01 RX ADMIN — BENZTROPINE MESYLATE 1 MG: 1 TABLET ORAL at 20:46

## 2021-08-01 RX ADMIN — POTASSIUM CHLORIDE 10 MEQ: 10 TABLET, EXTENDED RELEASE ORAL at 08:32

## 2021-08-01 RX ADMIN — AMLODIPINE BESYLATE 5 MG: 5 TABLET ORAL at 08:32

## 2021-08-01 RX ADMIN — LOSARTAN POTASSIUM 100 MG: 100 TABLET, FILM COATED ORAL at 08:32

## 2021-08-01 RX ADMIN — TRAZODONE HYDROCHLORIDE 50 MG: 50 TABLET ORAL at 20:46

## 2021-08-01 RX ADMIN — CHOLECALCIFEROL TAB 125 MCG (5000 UNIT) 5000 UNITS: 125 TAB at 08:32

## 2021-08-01 ASSESSMENT — PAIN SCALES - GENERAL
PAINLEVEL_OUTOF10: 0
PAINLEVEL_OUTOF10: 0

## 2021-08-01 NOTE — PROGRESS NOTES
Offered 1:1 session with pt at ~1100. Pt refused. Pt was willing to look at magazines when offered individual activity.     Electronically signed by SWATI Toussaint MA on 8/1/2021 at 11:02 AM

## 2021-08-01 NOTE — PROGRESS NOTES
Psychiatric Progress Note    Rohith Owens  [id number]  08/01/21    CHIEF COMPLAINT: I get upset    HPI: Rohith Owens is a 81 yo  female who presents for exacerbation of Neurocognitive D/O Alzheimer type with behavioral disturbance. Pt noted recent exacerbation of mood and confusion with agitation. Pt noted she currently feels safe and comfortable on the unit. Pt was in agreement with treatment team.  Pt was polite and cordial during the interview process. 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 Pt noted she is doing [place] today. \"  Pt noted she is sleeping \"okay. ..last night. \"  Per staff she slept 7 hours. Pt noted her appetite is \"fine. \" She denies SI/HI and AV hallucinations. When asked about depression and anxiety, patient endorsed anxiety but was unable to rate it on a numeric scale. Her response to whether or not she was depressed was unintelligible. Bedside RN noted that patient is less labile and tongue thrust is improved. Patient was polite and cooperative throughout the interview.     Allergies   Allergen Reactions    Penicillins Hives    Sulfa Antibiotics Other (See Comments)     Reaction unknown       Medications Prior to Admission: amLODIPine (NORVASC) 5 MG tablet, Take 5 mg by mouth daily  vitamin D3 (CHOLECALCIFEROL) 125 MCG (5000 UT) TABS tablet, Take 50,000 Units by mouth daily  fenofibrate (TRIGLIDE) 160 MG tablet, Take 160 mg by mouth daily  levothyroxine (SYNTHROID) 25 MCG tablet, Take 25 mcg by mouth daily  losartan (COZAAR) 100 MG tablet, Take 100 mg by mouth daily  oxybutynin (DITROPAN) 5 MG tablet, Take 5 mg by mouth daily  potassium chloride (KLOR-CON) 10 MEQ extended release tablet, Take 10 mEq by mouth daily  simvastatin (ZOCOR) 40 MG tablet, Take 40 mg by mouth daily  venlafaxine (EFFEXOR XR) 75 MG extended release capsule, Take 75 mg by mouth daily    Past Medical History:   Diagnosis Date    Essential hypertension     Hyperlipidemia         Patient Active Problem List   Diagnosis    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 (HCC)    Hypothyroidism due to acquired atrophy of thyroid    Essential hypertension    Overactive bladder    Hypokalemia    Other hyperlipidemia       Review of Systems    OBJECTIVE  Vital Signs:  Vitals:    08/01/21 0815   BP: (!) 164/80   Pulse: 65   Resp: 16   Temp: 97.9 °F (36.6 °C)   SpO2: 97%       Labs:  Recent Results (from the past 48 hour(s))   Basic metabolic panel    Collection Time: 07/31/21  6:00 AM   Result Value Ref Range    Sodium 139 135 - 145 MMOL/L    Potassium 4.3 3.5 - 5.1 MMOL/L    Chloride 101 99 - 110 mMol/L    CO2 32 21 - 32 MMOL/L    Anion Gap 6 4 - 16    BUN 28 (H) 6 - 23 MG/DL    CREATININE 1.1 0.6 - 1.1 MG/DL    Glucose 89 70 - 99 MG/DL    Calcium 9.8 8.3 - 10.6 MG/DL    GFR Non- 49 (L) >60 mL/min/1.73m2    GFR  59 (L) >60 mL/min/1.73m2   Vitamin D 25 hydroxy    Collection Time: 07/31/21  6:00 AM   Result Value Ref Range    Vit D, 25-Hydroxy 32.10 >20 NG/ML   Vitamin B12 & folate    Collection Time: 07/31/21  6:00 AM   Result Value Ref Range    Vitamin B-12 664.3 211 - 911 pg/ml    Folate 3.8 3.1 - 17.5 NG/ML   Ammonia    Collection Time: 07/31/21  8:35 AM   Result Value Ref Range    Ammonia 13 11 - 51 UMOL/L   Lipid panel    Collection Time: 08/01/21  5:30 AM   Result Value Ref Range    Triglycerides 171 (H) <150 MG/DL    Cholesterol 145 <200 MG/DL    HDL 35 (L) >40 MG/DL    LDL Direct 80 <100 MG/DL       PSYCHIATRIC ASSESSMENT / MENTAL STATUS EXAM:   Vitals: Blood pressure (!) 164/80, pulse 65, temperature 97.9 °F (36.6 °C), temperature source Temporal, resp. rate 16, height 5' 2\" (1.575 m), weight 142 lb (64.4 kg), SpO2 97 %. CONSTITUTIONAL:    Appearance: Appears stated age.  Alert and oriented to person and month only. No acute distress. Adequate grooming and hygiene. Fair eye contact. No prominent physical abnormalities. Attitude: Manner is cooperative and pleasant  Motor: No psychomotor agitation, retardation or abnormal movements noted  Speech: Clearly articulated; normal rate, volume, tone & amount. Language: intact understanding and production  Mood: anxious  Affect: euthymic, full range, non-labile, congruent with mood and content of speech  Thought Production: Spontaneous. Thought Form: linear at times yet Noted tangentiality and circumstantiality with loosening of associations. Thought Content/Perceptions: No NETTIE, noted both AVH, noted delusion  Insight: poor  Judgment: poor  Memory: Immediate, recent, and remote appear impaired, though not formally tested. Attention: maintained throughout interview  Fund of knowledge: Average  Gait/Balance: not assessed    IMPRESSION:   Neurocognitive D/O Alzheimer type with behavioral disturbance    Tardive Dyskinesia   Al movements    PLAN:  Psychiatric management:medication initiation and titration, group and individual therapy, safe and therapeutic environment. Status of problem/condition: Improving  Medical co-morbidities: Management per [facility] group, appreciate assistance  Legal Status:Pending  Disposition:estimated LOS: Pending. The treatment team reviewed with the patient the diagnosis and treatment recommendations to include the risks, benefits, and side effects of chosen medications. The patient verbalized understanding and agreed with the treatment regimen as outlined above. The patient was encouraged to participate in groups.   Medical records, Labs, Diagnotic tests reviewed  q15 min safety checks for safety  Interval History  Review current labs  Continue current medications  Supportive Therapy Provided  Pt had an opportunity to ask questions and address concerns  Pt encouraged to continue therapy group or individual.  Pt was in agreement with treatment plan. The risks benefits and side effects of medications were discussed with the patient, including alternatives and no treatment.     Electronically signed by GURDEEP De Oliveira CNP on 8/1/2021 at 1:26 PM

## 2021-08-01 NOTE — GROUP NOTE
Group Therapy Note    Date: 8/1/2021    Group Start Time: 0830  Group End Time: 5854    Number of Participants: 4/4    Type: Morning Goals Group/ Community Meeting    Group Topic/Objective: Set Goal For The Day and to review Unit Rules and Regulations. Patient's Goal:  Pt states her goal is \"Take a shower. \"    Notes:  Pt states she is feeling \"sad\" and is grateful for \"I don't know. \"  Pt reports restless sleep of approximately 3-4 hours. Depression (0-10): 10    Anxiety (0-10): Pt endorsed, but refused to rate. Irritability/Aggitation (0-10): Pt refused to answer. Status After Intervention:  Improved    Participation Level:  Active Listener and Interactive    Participation Quality: Sharing    Speech:  pressured    Thought Process/Content: Logical    Affective Functioning: Blunted    Mood: Blunted    Level of consciousness:  Alert and Attentive    Response to Learning: Able to verbalize current knowledge/experience    Endings: None Reported    Modes of Intervention: Education, Support and Socialization    Discipline Responsible: Certified Therapeutic Recreation Specialist     Electronically signed by Ladi Pham MA on 8/1/2021 at 9:02 AM

## 2021-08-01 NOTE — PROGRESS NOTES
Patient in bed asleep by 9:30pm, then had some trouble going back to sleep after getting up to use the bathroom so was given trazadone PRN. Pt sleeping well throughout the night after this. She denied hallucinations, but admitted to being sad. No SI/HI and not in physical pain. Cooperative and redirectable, using a walker per recommendations of PT/OT. She is still having involuntary tongue movements making it hard to understand her speech, but communication is still possible.

## 2021-08-01 NOTE — PROGRESS NOTES
Up to dayroom for meals. Tongue thrusting is less forceful today. Speech difficult to understand. Paper and pen offered as patient can write well. She did not wish to write anything. Intermittently tearful, Nods head 'yes' when asked if she is home sick. Requesting to see her sister. Thought content and presence of delusions or hallucinations difficult to assess due to lack of verbal communication.

## 2021-08-01 NOTE — GROUP NOTE
Group Therapy Note    Date: 8/1/2021    Group Start Time: 5950  Group End Time: 1600    Number of Participants: 3/4    Type: Exercise/Recreation Group    Group Topic/Objective: Chair Exercises    Notes:  Pt attended group, but refused to participate. Pt did not respond to prompting/encouragement.      Status After Intervention:  Unchanged    Participation Level: None    Participation Quality: Resistant    Speech:  normal    Thought Process/Content: Logical    Affective Functioning: Blunted    Mood: Blunted    Level of consciousness:  Inattentive    Response to Learning: Able to verbalize current knowledge/experience and Able to verbalize/acknowledge new learning    Endings: None Reported    Modes of Intervention: Activity and Movement    Discipline Responsible: Certified Therapeutic Recreation Specialist     Electronically signed by Crystal Vegas MA on 8/1/2021 at 4:03 PM

## 2021-08-01 NOTE — PROGRESS NOTES
Pt seen by therapist from approximately  for 1:1 session. Pt given several leisure options and asked to pick one. Pt chose leisure activity; LENA. Pt unable to recall instruction for the activity and therapist explained them to her x2. Through out the activity, pt required Max prompting/assistance to identify when it was her turn and what cards she could play. After ~20 minutes, pt requested to stop activity. Pt helped therapist clean up and expressed enjoyment in the activity.       Electronically signed by SWATI Velasquez MA on 8/1/2021 at 2:57 PM

## 2021-08-02 PROCEDURE — 1240000000 HC EMOTIONAL WELLNESS R&B

## 2021-08-02 PROCEDURE — 6370000000 HC RX 637 (ALT 250 FOR IP): Performed by: PHYSICIAN ASSISTANT

## 2021-08-02 PROCEDURE — 99233 SBSQ HOSP IP/OBS HIGH 50: CPT | Performed by: NURSE PRACTITIONER

## 2021-08-02 PROCEDURE — 6370000000 HC RX 637 (ALT 250 FOR IP): Performed by: PSYCHIATRY & NEUROLOGY

## 2021-08-02 PROCEDURE — 97535 SELF CARE MNGMENT TRAINING: CPT

## 2021-08-02 RX ADMIN — ATORVASTATIN CALCIUM 20 MG: 20 TABLET, FILM COATED ORAL at 08:56

## 2021-08-02 RX ADMIN — OXYBUTYNIN CHLORIDE 5 MG: 5 TABLET, EXTENDED RELEASE ORAL at 08:56

## 2021-08-02 RX ADMIN — LOSARTAN POTASSIUM 100 MG: 100 TABLET, FILM COATED ORAL at 08:55

## 2021-08-02 RX ADMIN — TRAZODONE HYDROCHLORIDE 50 MG: 50 TABLET ORAL at 20:44

## 2021-08-02 RX ADMIN — BENZTROPINE MESYLATE 1 MG: 1 TABLET ORAL at 20:44

## 2021-08-02 RX ADMIN — OLANZAPINE 5 MG: 5 TABLET, FILM COATED ORAL at 20:44

## 2021-08-02 RX ADMIN — POTASSIUM CHLORIDE 10 MEQ: 10 TABLET, EXTENDED RELEASE ORAL at 08:56

## 2021-08-02 RX ADMIN — BENZTROPINE MESYLATE 1 MG: 1 TABLET ORAL at 08:56

## 2021-08-02 RX ADMIN — AMLODIPINE BESYLATE 5 MG: 5 TABLET ORAL at 08:55

## 2021-08-02 RX ADMIN — DIVALPROEX SODIUM 500 MG: 125 CAPSULE ORAL at 08:56

## 2021-08-02 RX ADMIN — LEVOTHYROXINE SODIUM 25 MCG: 25 TABLET ORAL at 06:11

## 2021-08-02 RX ADMIN — CHOLECALCIFEROL TAB 125 MCG (5000 UNIT) 5000 UNITS: 125 TAB at 08:56

## 2021-08-02 ASSESSMENT — PAIN SCALES - GENERAL
PAINLEVEL_OUTOF10: 0
PAINLEVEL_OUTOF10: 0

## 2021-08-02 NOTE — BH NOTE
Patient alert to name only, calm and cooperative with assessment. Ambulates on unit with walker independently. Medications taken whole in pudding without difficulty. Labile mood with crying at times Denies SI/AVH/HI. Pt has been incontinent this evening. Has tardive dyskinesia, difficult to understand at times. Denies pain. Call light within reach. Safety maintained.

## 2021-08-02 NOTE — GROUP NOTE
Group Therapy Note    Date: 8/2/2021    Group Start Time: 1505  Group End Time: 2923  Music Therapy Group     Number of Participants: 3/4    Group Topic/Objective: PRETTY provided opportunities for pt to make decisions and increase self-expression through song sharing. Notes: Pt was asleep during group and did not participate.     Discipline Responsible: Music Therapist-Board Certified    Electronically signed by PRETTY Rivas on 8/2/2021 at 4:08 PM

## 2021-08-02 NOTE — GROUP NOTE
Group Therapy Note    Date: 8/2/2021    Group Start Time: 0830  Group End Time: 0900    Number of Participants: 4/4    Type: Morning Goals Group/ Community Meeting    Group Topic/Objective: Set Goal For The Day and to review Unit Rules and Regulations. Patient's Goal:  Unintelligible response    Notes:  Pt was asleep in bed at start of group. Pt responded vocally to all questions asked with only 1/8 responses being intelligible. Pt was asked how she was feeling and pt vocalized \"real good. \" Per previous discussion with nurse, the pt's vocalizations have been more easily understood recently, and the PRETTY notes that the pt's unintelligible responses this morning were most likely due to drowsiness. Pt remained with eyes closed throughout discussion.     Depression (0-10): Unintelligible     Anxiety (0-10): Unintelligible     Irritability/Aggitation (0-10): Unintelligible     Status After Intervention:  Unchanged    Participation Level: Minimal    Participation Quality: Lethargic    Speech:  Pt presented as drowsy, pt's answers were mostly unintelligible    Thought Process/Content: Unable to report d/t pt's lethargy     Affective Functioning: Flat    Mood: Flat    Level of consciousness:  Drowsy    Response to Learning: Unable to report d/t pt's lethargy     Endings: None Reported    Modes of Intervention: Education, Support and Socialization    Discipline Responsible: Music Therapist-Board Certified    Electronically signed by PRETTY Valero on 8/2/2021 at 9:15 AM

## 2021-08-02 NOTE — PLAN OF CARE
by Justyn Flores RN  Outcome: Ongoing  8/2/2021 1232 by Rosie Terrazas RN  Outcome: Ongoing  Goal: Absence of physical injury  Description: Absence of physical injury  8/2/2021 1945 by Justyn Flores RN  Outcome: Ongoing  8/2/2021 1232 by Rosie Terrazas RN  Outcome: Ongoing     Problem: Mood - Altered:  Goal: Mood stable  Description: Mood stable  8/2/2021 1945 by Justyn Flores RN  Outcome: Ongoing  8/2/2021 1232 by Rosie Terrazas RN  Outcome: Ongoing  Goal: Absence of abusive behavior  Description: Absence of abusive behavior  8/2/2021 1945 by Justyn Flores RN  Outcome: Ongoing  8/2/2021 1232 by Rosie Terrazas RN  Outcome: Ongoing  Goal: Verbalizations of feeling emotionally comfortable while being cared for will increase  Description: Verbalizations of feeling emotionally comfortable while being cared for will increase  8/2/2021 1945 by Justyn Flores RN  Outcome: Ongoing  8/2/2021 1232 by Rosie Terrazas RN  Outcome: Ongoing     Problem: Psychomotor Activity - Altered:  Goal: Absence of psychomotor disturbance signs and symptoms  Description: Absence of psychomotor disturbance signs and symptoms  8/2/2021 1945 by Justyn Flores RN  Outcome: Ongoing  8/2/2021 1232 by Rosie Terrazas RN  Outcome: Ongoing     Problem: Sensory Perception - Impaired:  Goal: Demonstrations of improved sensory functioning will increase  Description: Demonstrations of improved sensory functioning will increase  8/2/2021 1945 by Justyn Flores RN  Outcome: Ongoing  8/2/2021 1232 by Rosie Terrazas RN  Outcome: Ongoing  Goal: Decrease in sensory misperception frequency  Description: Decrease in sensory misperception frequency  8/2/2021 1945 by Justyn Flores RN  Outcome: Ongoing  8/2/2021 1232 by Rosie Terrazas RN  Outcome: Ongoing  Goal: Able to refrain from responding to false sensory perceptions  Description: Able to refrain from responding to false sensory perceptions  8/2/2021 1945 by Justyn Flores

## 2021-08-02 NOTE — PROGRESS NOTES
Physical Therapy  Attempted PT treatment session - patient asleep in her room and PT was unable to arouse- will attempt treatment session tomorrow   Jessie Barton, PT, 8/2/2021,  3:43 PM

## 2021-08-02 NOTE — PROGRESS NOTES
Occupational Therapy Treatment Note      Name: Sunshine Morrison MRN: [id number] :   1950   Date:  2021   Admission Date: 2021 Room:  [address] [place] [address]     Primary Problem:  Exacerbation of Neurocognitive D/O Alzheimer type with behavioral disturbance. Restrictions/Precautions:  Restrictions/Precautions  Restrictions/Precautions: Fall Risk, General Precautions     Communication with other providers:  Farhan Ramirez to see per nurse, spoke with CM re: Pt status. OT recommends SNF or assisted living at [place] and continued therapy    Subjective:  Patient states:  \"I'm feeling good today\"  Pain:  (location, type, intensity) 0/10 reported    Objective:    Observation:  Pt awake sitting at dining table eating breakfast, Pt agreeable to work with therapist  Objective Measures:  Pt seen for ADLs, functional mobility     Treatment, including education:    Pt seated at dining table eating breakfast.  Pt pleasant and agreeable to working with therapist.  Pt able to talk and answer questions with improvement today and demonstrated improvement in balance and functional mobility using a RW. Pt required min verbal cues throughout during ADLs and mobility using RW. Transfers:  Sit to stand: CGA  Stand to sit: SBA  Toilet trf: SBA, Pt used rail on wall with 2 hands to sit/stand on low toilet    ADLs:  Toileting: SBA, Pt able to perform harpreet care and manage depends  LE dressing: SBA, Pt pulled up socks, able to pull up/down depends  Grooming: SBA, Pt stood at sink to wash hands after min prompts to locate soap  Eating: mod I, Pt demo seated at table eating breakfast after s/u    Therapeutic Activity:  Pt amb in sampson from dining area to her room and bathroom ~65ft using RW with SBA. After toileting Pt trf to sink to wash hands and amb to end of sampson and back to dining area ~85ft with SBA and  min VC for walker safety to stay within center of walker. Pt tolerated well and demo no LOB.       Assessment / Impression:    Patient's tolerance of treatment: Good  Adverse Reaction: None  Significant change in status and impact:  Improvement in functional mobility  Barriers to improvement: None      Short term goals  Time Frame for Short term goals: Until DC or goals met  Short term goal 1: Pt will demo safe trfs/functional mobility mod I  Short term goal 2: Pt will complete UE bathing/dressing indep with min prompts to initiate  Short term goal 3: Pt will complete LE bathing/dressing indep with min prompts to initiate  Short term goal 4: Pt will demo 5+ min of fuctional mobility for ADLs/therax w/o LOB  Short term goal 5: Pt will complete toileting mod I    Plan for Next Session:    Continue current POC    Time in:  1010  Time out:  1025  Timed treatment minutes:  15  Total treatment time:  15      Electronically signed by:    KAREN Castro/MAX [id number]  8/2/2021, 11:18 AM

## 2021-08-02 NOTE — PROGRESS NOTES
Patient up to eat evening meal. Able to eat without forceful tongue thrusting. Slept soundly much of the afternoon and refused to get up for dinner until 6 p.m. Alert-The patient is alert, awake and responds to voice. The patient is oriented to time, place, and person. The triage nurse is able to obtain subjective information.

## 2021-08-02 NOTE — GROUP NOTE
Group Therapy Note    Date: 8/2/2021    Group Start Time: 1400  Group End Time: 1500  Group Topic: [address] Unit    RUBIN Delgado        Group Therapy Note    Attendees: 3/4       Unable to attend group today. Pt is resting in her room.     Signature:  RUBIN Delgado

## 2021-08-02 NOTE — PLAN OF CARE
Problem: Skin Integrity:  Goal: Will show no infection signs and symptoms  Description: Will show no infection signs and symptoms  8/1/2021 1012 by Haley Bailey RN  Outcome: Ongoing  Goal: Absence of new skin breakdown  Description: Absence of new skin breakdown  8/1/2021 1012 by Haley Bailey RN  Outcome: Ongoing     Problem: Altered Mood, Depressive Behavior:  Goal: Absence of self-harm  Description: Absence of self-harm  8/1/2021 1012 by Haley Bailey RN  Outcome: Ongoing  Goal: Patient specific goal  Description: Patient specific goal  8/1/2021 1012 by Haley Bailey RN  Outcome: Ongoing     Problem: Altered Mood, Deterioration in Function:  Goal: Ability to perform activities of daily living will improve  Description: Ability to perform activities of daily living will improve  8/1/2021 1012 by Haley Bailey RN  Outcome: Ongoing  Goal: Able to verbalize reality based thinking  Description: Able to verbalize reality based thinking  8/1/2021 1012 by Haley Bailey RN  Outcome: Ongoing  Goal: Skin appearance normal  Description: Skin appearance normal  8/1/2021 1012 by Haley Bailey RN  Outcome: Ongoing  Goal: Maintenance of adequate nutrition will improve  Description: Maintenance of adequate nutrition will improve  8/1/2021 1012 by Haley Bailey RN  Outcome: Ongoing  Goal: Ability to tolerate increased activity will improve  Description: Ability to tolerate increased activity will improve  8/1/2021 1012 by Haley Bailey RN  Outcome: Ongoing  Goal: Participates in care planning  Description: Participates in care planning  8/1/2021 1012 by Haley Bailey RN  Outcome: Ongoing  Goal: Patient specific goal  Description: Patient specific goal  8/1/2021 1012 by Haley Bailey RN  Outcome: Ongoing     Problem: Falls - Risk of:  Goal: Will remain free from falls  Description: Will remain free from falls  8/1/2021 1012 by Haley Bailey RN  Outcome: Ongoing  Goal: Absence of physical injury  Description: Absence of physical injury  8/1/2021 1012 by Karen Montes De Oca RN  Outcome: Ongoing     Problem: Confusion - Acute:  Goal: Absence of continued neurological deterioration signs and symptoms  Description: Absence of continued neurological deterioration signs and symptoms  8/1/2021 1012 by Karen Montes De Oca RN  Outcome: Ongoing  Goal: Mental status will be restored to baseline  Description: Mental status will be restored to baseline  8/1/2021 1012 by Karen Montes De Oca RN  Outcome: Ongoing     Problem: Discharge Planning:  Goal: Ability to perform activities of daily living will improve  Description: Ability to perform activities of daily living will improve  8/1/2021 1012 by Karen Montes De Oca RN  Outcome: Ongoing  Goal: Participates in care planning  Description: Participates in care planning  8/1/2021 1012 by Karen Montes De Oca RN  Outcome: Ongoing     Problem: Injury - Risk of, Physical Injury:  Goal: Will remain free from falls  Description: Will remain free from falls  8/1/2021 1012 by Karen Montes De Oca RN  Outcome: Ongoing  Goal: Absence of physical injury  Description: Absence of physical injury  8/1/2021 1012 by Karen Montes De Oca RN  Outcome: Ongoing     Problem: Mood - Altered:  Goal: Mood stable  Description: Mood stable  8/1/2021 1012 by Karen Montes De Oca RN  Outcome: Ongoing  Goal: Absence of abusive behavior  Description: Absence of abusive behavior  8/1/2021 1012 by Karen Montes De Oca RN  Outcome: Ongoing  Goal: Verbalizations of feeling emotionally comfortable while being cared for will increase  Description: Verbalizations of feeling emotionally comfortable while being cared for will increase  8/1/2021 1012 by Karen Montes De Oca RN  Outcome: Ongoing     Problem: Psychomotor Activity - Altered:  Goal: Absence of psychomotor disturbance signs and symptoms  Description: Absence of psychomotor disturbance signs and symptoms  8/1/2021 1012 by Karen Montes De Oca RN  Outcome: Ongoing     Problem: Sensory Perception - Impaired:  Goal: Demonstrations of improved sensory functioning will increase  Description: Demonstrations of improved sensory functioning will increase  8/1/2021 1012 by Ridge Pichardo RN  Outcome: Ongoing  Goal: Decrease in sensory misperception frequency  Description: Decrease in sensory misperception frequency  8/1/2021 1012 by Ridge Pichardo RN  Outcome: Ongoing  Goal: Able to refrain from responding to false sensory perceptions  Description: Able to refrain from responding to false sensory perceptions  8/1/2021 1012 by Ridge Pichardo RN  Outcome: Ongoing  Goal: Demonstrates accurate environmental perceptions  Description: Demonstrates accurate environmental perceptions  8/1/2021 1012 by Ridge Pichardo RN  Outcome: Ongoing  Goal: Able to distinguish between reality-based and nonreality-based thinking  Description: Able to distinguish between reality-based and nonreality-based thinking  8/1/2021 1012 by Ridge Pichardo RN  Outcome: Ongoing  Goal: Able to interrupt nonreality-based thinking  Description: Able to interrupt nonreality-based thinking  8/1/2021 1012 by Ridge Pichardo RN  Outcome: Ongoing     Problem: Sleep Pattern Disturbance:  Goal: Appears well-rested  Description: Appears well-rested  8/1/2021 1012 by Ridge Pichardo RN  Outcome: Ongoing

## 2021-08-02 NOTE — PROGRESS NOTES
Psychiatric Progress Note    Marci Curran  [id number]  08/02/21    CHIEF COMPLAINT: I get upset    HPI: Marci Curran is a 79 yo  female who presents for exacerbation of Neurocognitive D/O Alzheimer type with behavioral disturbance. Pt noted recent exacerbation of mood and confusion with agitation. Pt noted she currently feels safe and comfortable on the unit. Pt was in agreement with treatment team.  Pt was polite and cordial during the interview process. 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 Pt noted she is doing [place] I guess. \"  Pt noted she is sleeping \"okay. ..last night. \"  Per staff she slept 7 hours. Pt noted her appetite is \"I don't know. \" She denies SI/HI and AV hallucinations. When asked about depression and anxiety, patient endorsed both but was unable to rate them on a numeric scale. Bedside RN noted that patient is less labile and tongue thrust is improved. Patient was polite and cooperative throughout the interview.     Allergies   Allergen Reactions    Penicillins Hives    Sulfa Antibiotics Other (See Comments)     Reaction unknown       Medications Prior to Admission: amLODIPine (NORVASC) 5 MG tablet, Take 5 mg by mouth daily  vitamin D3 (CHOLECALCIFEROL) 125 MCG (5000 UT) TABS tablet, Take 50,000 Units by mouth daily  fenofibrate (TRIGLIDE) 160 MG tablet, Take 160 mg by mouth daily  levothyroxine (SYNTHROID) 25 MCG tablet, Take 25 mcg by mouth daily  losartan (COZAAR) 100 MG tablet, Take 100 mg by mouth daily  oxybutynin (DITROPAN) 5 MG tablet, Take 5 mg by mouth daily  potassium chloride (KLOR-CON) 10 MEQ extended release tablet, Take 10 mEq by mouth daily  simvastatin (ZOCOR) 40 MG tablet, Take 40 mg by mouth daily  venlafaxine (EFFEXOR XR) 75 MG extended release capsule, Take 75 mg by mouth daily    Past Medical History:   Diagnosis Date    Essential hypertension     Hyperlipidemia         Patient Active Problem List   Diagnosis    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 ([facility] [address]    Hypothyroidism due to acquired atrophy of thyroid    Essential hypertension    Overactive bladder    Hypokalemia    Other hyperlipidemia       Review of Systems    OBJECTIVE  Vital Signs:  Vitals:    08/02/21 0745   BP: (!) 160/96   Pulse: 68   Resp: 16   Temp: 97.6 °F (36.4 °C)   SpO2: 98%       Labs:  Recent Results (from the past 48 hour(s))   Lipid panel    Collection Time: 08/01/21  5:30 AM   Result Value Ref Range    Triglycerides 171 (H) <150 MG/DL    Cholesterol 145 <200 MG/DL    HDL 35 (L) >40 MG/DL    LDL Direct 80 <100 MG/DL       PSYCHIATRIC ASSESSMENT / MENTAL STATUS EXAM:   Vitals: Blood pressure (!) 160/96, pulse 68, temperature 97.6 °F (36.4 °C), temperature source Temporal, resp. rate 16, height 5' 2\" (1.575 m), weight 142 lb (64.4 kg), SpO2 98 %. CONSTITUTIONAL:    Appearance: Appears stated age. Alert and oriented to person and month only. No acute distress. Adequate grooming and hygiene. Fair eye contact. No prominent physical abnormalities. Attitude: Manner is cooperative and pleasant  Motor: No psychomotor agitation, retardation or abnormal movements noted  Speech: Clearly articulated; normal rate, volume, tone & amount. Language: intact understanding and production  Mood: anxious  Affect: euthymic, full range, non-labile, congruent with mood and content of speech  Thought Production: Spontaneous. Thought Form: linear at times yet Noted tangentiality and circumstantiality with loosening of associations. Thought Content/Perceptions: No NETTIE, noted both AVH, noted delusion  Insight: poor  Judgment: poor  Memory: Immediate, recent, and remote appear impaired, though not formally tested.   Attention: maintained throughout interview  Fund of knowledge: Average  Gait/Balance: not assessed    IMPRESSION:   Neurocognitive D/O Alzheimer type with behavioral disturbance    Tardive Dyskinesia   [place] movements    PLAN:  Psychiatric management:medication initiation and titration, group and individual therapy, safe and therapeutic environment. Status of problem/condition: Improving  Medical co-morbidities: Management per [facility] group, appreciate assistance  Legal Status:Pending  Disposition:estimated LOS: Pending. The treatment team reviewed with the patient the diagnosis and treatment recommendations to include the risks, benefits, and side effects of chosen medications. The patient verbalized understanding and agreed with the treatment regimen as outlined above. The patient was encouraged to participate in groups. Medical records, Labs, Diagnotic tests reviewed  q15 min safety checks for safety  Interval History  Review current labs  Continue current medications  Supportive Therapy Provided  Pt had an opportunity to ask questions and address concerns  Pt encouraged to continue therapy group or individual.  Pt was in agreement with treatment plan. The risks benefits and side effects of medications were discussed with the patient, including alternatives and no treatment.     Electronically signed by GURDEEP Vigil CNP on 8/2/2021 at 10:13 AM

## 2021-08-03 PROCEDURE — 1240000000 HC EMOTIONAL WELLNESS R&B

## 2021-08-03 PROCEDURE — 99232 SBSQ HOSP IP/OBS MODERATE 35: CPT | Performed by: PSYCHIATRY & NEUROLOGY

## 2021-08-03 PROCEDURE — 6370000000 HC RX 637 (ALT 250 FOR IP): Performed by: PSYCHIATRY & NEUROLOGY

## 2021-08-03 PROCEDURE — 6370000000 HC RX 637 (ALT 250 FOR IP): Performed by: PHYSICIAN ASSISTANT

## 2021-08-03 RX ADMIN — BENZTROPINE MESYLATE 1 MG: 1 TABLET ORAL at 21:23

## 2021-08-03 RX ADMIN — LEVOTHYROXINE SODIUM 25 MCG: 25 TABLET ORAL at 10:02

## 2021-08-03 RX ADMIN — AMLODIPINE BESYLATE 5 MG: 5 TABLET ORAL at 10:02

## 2021-08-03 RX ADMIN — ATORVASTATIN CALCIUM 20 MG: 20 TABLET, FILM COATED ORAL at 10:02

## 2021-08-03 RX ADMIN — BENZTROPINE MESYLATE 1 MG: 1 TABLET ORAL at 10:03

## 2021-08-03 RX ADMIN — LORAZEPAM 1 MG: 1 TABLET ORAL at 23:24

## 2021-08-03 RX ADMIN — LOSARTAN POTASSIUM 100 MG: 100 TABLET, FILM COATED ORAL at 10:02

## 2021-08-03 RX ADMIN — OXYBUTYNIN CHLORIDE 5 MG: 5 TABLET, EXTENDED RELEASE ORAL at 10:02

## 2021-08-03 RX ADMIN — CHOLECALCIFEROL TAB 125 MCG (5000 UNIT) 5000 UNITS: 125 TAB at 10:02

## 2021-08-03 RX ADMIN — DIVALPROEX SODIUM 500 MG: 125 CAPSULE ORAL at 10:02

## 2021-08-03 RX ADMIN — HALOPERIDOL 5 MG: 5 TABLET ORAL at 23:24

## 2021-08-03 RX ADMIN — POTASSIUM CHLORIDE 10 MEQ: 10 TABLET, EXTENDED RELEASE ORAL at 10:02

## 2021-08-03 RX ADMIN — OLANZAPINE 5 MG: 5 TABLET, FILM COATED ORAL at 21:23

## 2021-08-03 ASSESSMENT — PAIN SCALES - GENERAL
PAINLEVEL_OUTOF10: 0
PAINLEVEL_OUTOF10: 0

## 2021-08-03 NOTE — GROUP NOTE
Group Therapy Note    Date: 8/3/2021    Group Start Time: 0094  Group End Time: 5188    Number of Participants: 3/4    Type: Exercise/Recreation Group    Group Topic/Objective: Chair Exercises    Notes:  Pt attended group, but refused to participate.      Status After Intervention:  Unchanged    Participation Level: Minimal    Participation Quality: Resistant    Speech:  normal    Thought Process/Content: Logical    Affective Functioning: Blunted    Mood: Blunted    Level of consciousness:  Alert and Attentive    Response to Learning: Resistant    Endings: None Reported    Modes of Intervention: Activity and Movement    Discipline Responsible: Certified Therapeutic Recreation Specialist     Electronically signed by Magdalena Urrutia MA on 8/3/2021 at 4:21 PM

## 2021-08-03 NOTE — BH NOTE
Patient out on the unit, calm and cooperative with assessment.  Ambulates on unit with walker independently.  Medications taken whole in pudding without difficulty. Intermittently crying at times.  Denies SI/AVH/HI. Has tardive dyskinesia and is  difficult to understand at times. Patient toileted ahead of need to help prevent incontinence. Denies pain. Call light within reach. Safety maintained.

## 2021-08-03 NOTE — GROUP NOTE
Group Therapy Note    Date: 8/3/2021    Group Start Time: 0830  Group End Time: 0900     Number of Participants: 3/4    Type: Morning Goals Group/ Community Meeting    Group Topic/Objective: Set Goal For The Day and to review Unit Rules and Regulations. Notes:  Pt sleeping. Per discussion with staff, pt allowed to continue to sleep.      Discipline Responsible: Certified Therapeutic Recreation Specialist     Electronically signed by Marily Brady [address] [place] [address] on 8/3/2021 at 9:08 AM

## 2021-08-03 NOTE — GROUP NOTE
Group Therapy Note    Date: 8/3/2021    Group Start Time: 1030  Group End Time: 1100  Group Topic: Psychoeducation    [address]    SWATI Li, MA        Group Therapy Note    Attendees: 3/4       Notes:  Pt encouraged to attend, pt refused.        Discipline Responsible: Certified Therapeutic Recreation Specialist       Signature:  Shannan Li [place]

## 2021-08-03 NOTE — PROGRESS NOTES
Behavioral Services                                              Medicare Re-Certification    I certify that the inpatient psychiatric hospital services furnished since the previous certification/re-certification were, and continue to be, medically necessary for;    [x] (1) Treatment which could reasonably be expected to improve the patient's condition,    [x] (2) Or for diagnostic study. Estimated length of stay/service 7 days    Plan for post-hospital care follow up out pt mental health    This patient continues to need, on a daily basis, active treatment furnished directly by or requiring the supervision of inpatient psychiatric personnel.     Electronically signed by Valdo Martin DO on 8/3/2021 at 9:13 AM

## 2021-08-03 NOTE — PROGRESS NOTES
Psychiatric Progress Note    Brianna Devries  [id number]  08/03/21    CHIEF COMPLAINT: I get upset    HPI: Brianna Devries is a 79 yo  female who presents for exacerbation of Neurocognitive D/O alzehimers type with behavioral disturbance. Pt noted recent exacarbation of mood and confusion with agitationf. Pt noted she currently feels safe and comfortable on the unit. Pt was in agreement with treatment team.  Pt was polite and cordial during the interview process. 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 \"  Pt noted she is sleeping \"okay. Poonam Dye last night. \"  Pt noted her apptetite is reduced. Pt rated her depresssion a \"2,\" on a scale of zero to ten with ten being the worst and zero being none. Pt rated her anxiety a \"3,\" on the same scale. Pt denied any thoughts to harm herself or anyone else.   Pt denied any auditory or visiual hallucintations.           Allergies   Allergen Reactions    Penicillins Hives    Sulfa Antibiotics Other (See Comments)     Reaction unknown       Medications Prior to Admission: amLODIPine (NORVASC) 5 MG tablet, Take 5 mg by mouth daily  vitamin D3 (CHOLECALCIFEROL) 125 MCG (5000 UT) TABS tablet, Take 50,000 Units by mouth daily  fenofibrate (TRIGLIDE) 160 MG tablet, Take 160 mg by mouth daily  levothyroxine (SYNTHROID) 25 MCG tablet, Take 25 mcg by mouth daily  losartan (COZAAR) 100 MG tablet, Take 100 mg by mouth daily  oxybutynin (DITROPAN) 5 MG tablet, Take 5 mg by mouth daily  potassium chloride (KLOR-CON) 10 MEQ extended release tablet, Take 10 mEq by mouth daily  simvastatin (ZOCOR) 40 MG tablet, Take 40 mg by mouth daily  venlafaxine (EFFEXOR XR) 75 MG extended release capsule, Take 75 mg by mouth daily    Past Medical History:   Diagnosis Date    Essential hypertension     Hyperlipidemia         Patient Active Problem List   Diagnosis    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 ([facility] [address]    Hypothyroidism due to acquired atrophy of thyroid    Essential hypertension    Overactive bladder    Hypokalemia    Other hyperlipidemia       Review of Systems    OBJECTIVE  Vital Signs:  Vitals:    08/03/21 0745   BP: (!) 154/66   Pulse: 67   Resp: 16   Temp: 97.5 °F (36.4 °C)   SpO2: 96%       Labs:  No results found for this or any previous visit (from the past 48 hour(s)). PSYCHIATRIC ASSESSMENT / MENTAL STATUS EXAM:   Vitals: Blood pressure (!) 154/66, pulse 67, temperature 97.5 °F (36.4 °C), temperature source Temporal, resp. rate 16, height 5' 2\" (1.575 m), weight 142 lb (64.4 kg), SpO2 96 %. CONSTITUTIONAL:    Appearance: appears stated age. alert and oriented to person, place. no acute distress. Adequate grooming and hygeine. Good eye contact. No prominent physical abnormalities. Attitude: Manner is cooperative and pleasant  Motor: No psychomotor agitation, retardation or abnormal movements noted  Speech: Clearly articulated; normal rate, volume, tone & amount. Language: intact understanding and production  Mood: depressed  Affect: euthymic, full range, non-labile, congruent with mood and content of speech  Thought Production: Spontaneous. Thought Form: linear at times yet Noted tangentiality and circumstantiality with loosening of associations. Thought Content/Perceptions: No NETTIE, noted both AVH, noted delusion  Insight: poor  Judgment: poor  Memory: Immediate, recent, and remote appear impaired, though not formally tested. Attention: maintained throughout interview  Fund of knowledge: Average  Gait/Balance: WNL/WNL         IMPRESSION:   Neurocognitive D/O Alzheimer type with behavioral disturba    Tardive Dyskineasa   [place] movements    PLAN:  Psychiatric management:medication initiation and titration, group and individual therapy, safe and theraputic environment.   Status of problem/condition: ?Improving  Medical co-morbidities: Management per [facility] group, appreciate assistance  Legal Status:Pending  Disposition:estimated LOS: Pending. The treatment team reviewed with the patient the diagnosis and treatment recommendations to include the risks, benefits, and side effects of chosen medications. The patient verbalized understanding and agreed with the treatment regimen as outlined above. The patient was encouraged to participate in groups. Medical records, Labs, Diagnotic tests reviewed  q15 min safety checks for safety  Interval History. Review current labs  Continue current medications   Supportive Therapy Provided  Pt had an opportunity to ask questions and address concerns  Pt encouraged to continue therapy group or individual.  Pt was in agreement with treatment plan. The risks benefits and side effects of medications were discussed with the patient, including alternatives and no treatment.     Electronically signed by Shantelle Brothers DO on 8/3/2021 at 9:14 AM

## 2021-08-03 NOTE — FLOWSHEET NOTE
PT attempted to see pt this am about 9:00. She presented in bed sleeping, but did speak to PT with her eyes closed. She refused to get up 2x and then began ignoring PT.       Leann Duran PT DPT [id number]

## 2021-08-04 PROCEDURE — 6370000000 HC RX 637 (ALT 250 FOR IP): Performed by: PSYCHIATRY & NEUROLOGY

## 2021-08-04 PROCEDURE — 99232 SBSQ HOSP IP/OBS MODERATE 35: CPT | Performed by: PSYCHIATRY & NEUROLOGY

## 2021-08-04 PROCEDURE — 97116 GAIT TRAINING THERAPY: CPT

## 2021-08-04 PROCEDURE — 6370000000 HC RX 637 (ALT 250 FOR IP): Performed by: PHYSICIAN ASSISTANT

## 2021-08-04 PROCEDURE — 1240000000 HC EMOTIONAL WELLNESS R&B

## 2021-08-04 RX ORDER — OLANZAPINE 10 MG/1
10 TABLET ORAL NIGHTLY
Status: DISCONTINUED | OUTPATIENT
Start: 2021-08-04 | End: 2021-08-07

## 2021-08-04 RX ADMIN — BENZTROPINE MESYLATE 1 MG: 1 TABLET ORAL at 09:15

## 2021-08-04 RX ADMIN — POTASSIUM CHLORIDE 10 MEQ: 10 TABLET, EXTENDED RELEASE ORAL at 09:15

## 2021-08-04 RX ADMIN — LEVOTHYROXINE SODIUM 25 MCG: 25 TABLET ORAL at 05:10

## 2021-08-04 RX ADMIN — AMLODIPINE BESYLATE 5 MG: 5 TABLET ORAL at 09:15

## 2021-08-04 RX ADMIN — OLANZAPINE 10 MG: 10 TABLET ORAL at 20:28

## 2021-08-04 RX ADMIN — ATORVASTATIN CALCIUM 20 MG: 20 TABLET, FILM COATED ORAL at 09:15

## 2021-08-04 RX ADMIN — OXYBUTYNIN CHLORIDE 5 MG: 5 TABLET, EXTENDED RELEASE ORAL at 09:15

## 2021-08-04 RX ADMIN — TRAZODONE HYDROCHLORIDE 50 MG: 50 TABLET ORAL at 20:28

## 2021-08-04 RX ADMIN — DIVALPROEX SODIUM 500 MG: 125 CAPSULE ORAL at 09:15

## 2021-08-04 RX ADMIN — LOSARTAN POTASSIUM 100 MG: 100 TABLET, FILM COATED ORAL at 09:15

## 2021-08-04 RX ADMIN — BENZTROPINE MESYLATE 1 MG: 1 TABLET ORAL at 20:28

## 2021-08-04 RX ADMIN — CHOLECALCIFEROL TAB 125 MCG (5000 UNIT) 5000 UNITS: 125 TAB at 09:15

## 2021-08-04 ASSESSMENT — PAIN SCALES - GENERAL
PAINLEVEL_OUTOF10: 0
PAINLEVEL_OUTOF10: 0

## 2021-08-04 NOTE — PROGRESS NOTES
Psychiatric Progress Note    Marci Curran  [id number]  08/04/21    CHIEF COMPLAINT: I get upset    HPI: Marci Curran is a 79 yo  female who presents for exacerbation of Neurocognitive D/O alzehimers type with behavioral disturbance. Pt noted recent exacarbation of mood and confusion with agitationf. Pt noted she currently feels safe and comfortable on the unit. Pt was in agreement with treatment team.  Pt was polite and cordial during the interview process. 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 \"  Pt noted she is sleeping \"okay. Darlynn Magic last night. \"  Pt noted her apptetite is reduced. Pt rated her depresssion a \"2,\" on a scale of zero to ten with ten being the worst and zero being none. Pt rated her anxiety a \"3,\" on the same scale. Pt denied any thoughts to harm herself or anyone else.   Pt denied any auditory or visiual hallucintations.           Allergies   Allergen Reactions    Penicillins Hives    Sulfa Antibiotics Other (See Comments)     Reaction unknown       Medications Prior to Admission: amLODIPine (NORVASC) 5 MG tablet, Take 5 mg by mouth daily  vitamin D3 (CHOLECALCIFEROL) 125 MCG (5000 UT) TABS tablet, Take 50,000 Units by mouth daily  fenofibrate (TRIGLIDE) 160 MG tablet, Take 160 mg by mouth daily  levothyroxine (SYNTHROID) 25 MCG tablet, Take 25 mcg by mouth daily  losartan (COZAAR) 100 MG tablet, Take 100 mg by mouth daily  oxybutynin (DITROPAN) 5 MG tablet, Take 5 mg by mouth daily  potassium chloride (KLOR-CON) 10 MEQ extended release tablet, Take 10 mEq by mouth daily  simvastatin (ZOCOR) 40 MG tablet, Take 40 mg by mouth daily  venlafaxine (EFFEXOR XR) 75 MG extended release capsule, Take 75 mg by mouth daily    Past Medical History:   Diagnosis Date    Essential hypertension     Hyperlipidemia         Patient Active Problem List   Diagnosis    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 ([facility] [address]    Hypothyroidism due to acquired atrophy of thyroid    Essential hypertension    Overactive bladder    Hypokalemia    Other hyperlipidemia       Review of Systems    OBJECTIVE  Vital Signs:  Vitals:    08/04/21 0800   BP: 133/69   Pulse: 68   Resp: 20   Temp: 97.5 °F (36.4 °C)   SpO2: 98%       Labs:  No results found for this or any previous visit (from the past 48 hour(s)). PSYCHIATRIC ASSESSMENT / MENTAL STATUS EXAM:   Vitals: Blood pressure 133/69, pulse 68, temperature 97.5 °F (36.4 °C), temperature source Temporal, resp. rate 20, height 5' 2\" (1.575 m), weight 142 lb (64.4 kg), SpO2 98 %. CONSTITUTIONAL:    Appearance: appears stated age. alert and oriented to person, place. no acute distress. Adequate grooming and hygeine. Good eye contact. No prominent physical abnormalities. Attitude: Manner is cooperative and pleasant  Motor: No psychomotor agitation, retardation or abnormal movements noted  Speech: Clearly articulated; normal rate, volume, tone & amount. Language: intact understanding and production  Mood: depressed  Affect: euthymic, full range, non-labile, congruent with mood and content of speech  Thought Production: Spontaneous. Thought Form: linear at times yet Noted tangentiality and circumstantiality with loosening of associations. Thought Content/Perceptions: No NETTIE, noted both AVH, noted delusion  Insight: poor  Judgment: poor  Memory: Immediate, recent, and remote appear impaired, though not formally tested. Attention: maintained throughout interview  Fund of knowledge: Average  Gait/Balance: WNL/WNL         IMPRESSION:   Neurocognitive D/O Alzheimer type with behavioral disturba    Tardive Dyskineasa   [place] movements    PLAN:  Psychiatric management:medication initiation and titration, group and individual therapy, safe and theraputic environment.   Status of problem/condition: ?Improving  Medical co-morbidities: Management per [facility] group, appreciate assistance  Legal Status:Pending  Disposition:estimated LOS: Pending. The treatment team reviewed with the patient the diagnosis and treatment recommendations to include the risks, benefits, and side effects of chosen medications. The patient verbalized understanding and agreed with the treatment regimen as outlined above. The patient was encouraged to participate in groups. Medical records, Labs, Diagnotic tests reviewed  q15 min safety checks for safety  Interval History. Review current labs  Continue current medications   Supportive Therapy Provided  Pt had an opportunity to ask questions and address concerns  Pt encouraged to continue therapy group or individual.  Pt was in agreement with treatment plan. The risks benefits and side effects of medications were discussed with the patient, including alternatives and no treatment.     Electronically signed by Ekta Schilling DO on 8/4/2021 at 10:06 AM

## 2021-08-04 NOTE — FLOWSHEET NOTE
Physical Therapy Treatment Note   Date: 2021 Room: [unfilled]   Name: Rohith Owens : 1950   MRN: [id number] Admission Date:2021    Primary Problem:   Past Medical History:   Diagnosis Date    Essential hypertension     Hyperlipidemia      History reviewed. No pertinent surgical history. Rehabilitation Diagnosis: impaired mobility  Restrictions/Precautions:     Subjective:   Initial Pain level: 0/10    Goals:                   Short term goals  Time Frame for Short term goals: 1 week  Short term goal 1: patient will safely ambulate 150 ft using walkier with CGA  Short term goal 2: patient will stand safely x5' with UE support as needed while perfoming reaching and other staic balance actvities               Plan of Care:             Times per week: 1+                   Objective Findings: Patient requires use of walker for safety- without walker patient will reach out for support and will lose balance posteriorly   Date:21 Date:  Date:  Date:  Date:    Bed mobility Independent       Sit to stand transfer Mod Independent       Stand to sit transfer Mod independent       Commode transfer Not performed       Standing tolerance fair       Ambulation Ambulated 50 ft with RW and CGA-        Stairs Not performed         Exercises:   Exercise/Equipment/Modalities  Date:  Date:  Date:  Date:                                                                               Modality/intervention used:   [ ] Therapeutic Exercise   [ ] Modalities:   [ ] Therapeutic Activity     [ ] Ultrasound   [ ] Eve Fees   [ ] Gait Training     [ ] Cervical Traction  [ ] Lumbar Traction   [ ] Neuromuscular Re-education   [ ] Cold/hotpack  [ ] Iontophoresis   [ ] Instruction in HEP     [ ] Frutoso Buff   [ ] Manual Therapy   [ ] Aquatic Therapy     Other Therapeutic Activities:    Home Exercise Program/Education provided to patient:     Manual Treatments:     Communication with other providers:      Adverse reactions to treatment:     Treatment/Activity Tolerance:   [ ] Patient limited by fatigue [ ] Patient limited by pain [ ] Patient limited by other medical complications [ x] Patient tolerated therapy well  [ ] Other:     Post Tx Pain Ratin/10     Safety Precautions:   [ ] left in bed  [x ] left in chair [ ] call light within reach  [ ] bed alarm on  [ ] personal alarm on  [x ] other staff present:    Plan:   [x ] Continue per plan of care [ ] Piper Carreon current plan   [ ] Plan of care initiated [ ] Hold pending MD visit [ ] Discharge     Plan for Next Session:     Time In: 926  Time Out: 941  Total Treatment Minutes: 15'  Billed Units: 1    Signed: Leilani Kehr, [address] [place] [address], 2021, 11:09 AM

## 2021-08-04 NOTE — PLAN OF CARE
Problem: Skin Integrity:  Goal: Will show no infection signs and symptoms  Description: Will show no infection signs and symptoms  8/3/2021 2349 by Ronaldo Wooadll RN  Outcome: Ongoing  8/3/2021 1039 by Vania Liang RN  Outcome: Ongoing  Goal: Absence of new skin breakdown  Description: Absence of new skin breakdown  8/3/2021 2349 by Ronaldo Woodall RN  Outcome: Ongoing  8/3/2021 1039 by Vania Liang RN  Outcome: Ongoing     Problem: Altered Mood, Depressive Behavior:  Goal: Absence of self-harm  Description: Absence of self-harm  8/3/2021 2349 by Ronaldo Woodall RN  Outcome: Ongoing  8/3/2021 1039 by Vania Liang RN  Outcome: Ongoing  Goal: Patient specific goal  Description: Patient specific goal  8/3/2021 2349 by Ronaldo Woodall RN  Outcome: Ongoing  8/3/2021 1039 by Vania Liang RN  Outcome: Ongoing     Problem: Altered Mood, Deterioration in Function:  Goal: Ability to perform activities of daily living will improve  Description: Ability to perform activities of daily living will improve  8/3/2021 2349 by Ronaldo Woodall RN  Outcome: Ongoing  8/3/2021 1039 by Vania Liang RN  Outcome: Ongoing  Goal: Able to verbalize reality based thinking  Description: Able to verbalize reality based thinking  8/3/2021 2349 by Ronaldo Woodall RN  Outcome: Ongoing  8/3/2021 1039 by Vania Liang RN  Outcome: Ongoing  Goal: Skin appearance normal  Description: Skin appearance normal  8/3/2021 2349 by Ronaldo Woodall RN  Outcome: Ongoing  8/3/2021 1039 by Vania Liang RN  Outcome: Ongoing  Goal: Maintenance of adequate nutrition will improve  Description: Maintenance of adequate nutrition will improve  8/3/2021 2349 by Ronaldo Woodall RN  Outcome: Ongoing  8/3/2021 1039 by Vania Liang RN  Outcome: Ongoing  Goal: Ability to tolerate increased activity will improve  Description: Ability to tolerate increased activity will improve  8/3/2021 2349 by Ronaldo Woodall RN  Outcome: Ongoing  8/3/2021 1039 by Alejandra Rios RN  Outcome: Ongoing  Goal: Participates in care planning  Description: Participates in care planning  8/3/2021 2349 by Fauzia Marcus RN  Outcome: Ongoing  8/3/2021 1039 by Alejandra Rios RN  Outcome: Ongoing  Goal: Patient specific goal  Description: Patient specific goal  8/3/2021 2349 by Fauzia Marcus RN  Outcome: Ongoing  8/3/2021 1039 by Alejandra Rios RN  Outcome: Ongoing     Problem: Falls - Risk of:  Goal: Will remain free from falls  Description: Will remain free from falls  8/3/2021 2349 by Fauzia Marcus RN  Outcome: Ongoing  8/3/2021 1039 by Alejandra Rios RN  Outcome: Ongoing  Goal: Absence of physical injury  Description: Absence of physical injury  8/3/2021 2349 by Fauzia Marcus RN  Outcome: Ongoing  8/3/2021 1039 by Alejandra Rios RN  Outcome: Ongoing     Problem: Confusion - Acute:  Goal: Absence of continued neurological deterioration signs and symptoms  Description: Absence of continued neurological deterioration signs and symptoms  8/3/2021 2349 by Fauzia Marcus RN  Outcome: Ongoing  8/3/2021 1039 by Alejandra Rios RN  Outcome: Ongoing  Goal: Mental status will be restored to baseline  Description: Mental status will be restored to baseline  8/3/2021 2349 by Fauzia Marcus RN  Outcome: Ongoing  8/3/2021 1039 by Alejandra Rios RN  Outcome: Ongoing     Problem: Discharge Planning:  Goal: Ability to perform activities of daily living will improve  Description: Ability to perform activities of daily living will improve  8/3/2021 2349 by Fauzia Marcus RN  Outcome: Ongoing  8/3/2021 1039 by Alejandra Rios RN  Outcome: Ongoing  Goal: Participates in care planning  Description: Participates in care planning  8/3/2021 2349 by Fauzia Marcus RN  Outcome: Ongoing  8/3/2021 1039 by Alejandra Rios RN  Outcome: Ongoing     Problem: Injury - Risk of, Physical Injury:  Goal: Will remain free from falls  Description: Will remain free from falls  8/3/2021 2349 by Krishna Marie Cristin Guillory RN  Outcome: Ongoing  8/3/2021 1039 by Chris Nayak RN  Outcome: Ongoing  Goal: Absence of physical injury  Description: Absence of physical injury  8/3/2021 2349 by Sarah Salamanca RN  Outcome: Ongoing  8/3/2021 1039 by Chris Nayka RN  Outcome: Ongoing     Problem: Mood - Altered:  Goal: Mood stable  Description: Mood stable  8/3/2021 2349 by Sarah Salamanca RN  Outcome: Ongoing  8/3/2021 1039 by Chris Nayak RN  Outcome: Ongoing  Goal: Absence of abusive behavior  Description: Absence of abusive behavior  8/3/2021 2349 by Sarah Salamanca RN  Outcome: Ongoing  8/3/2021 1039 by Chris Nayak RN  Outcome: Ongoing  Goal: Verbalizations of feeling emotionally comfortable while being cared for will increase  Description: Verbalizations of feeling emotionally comfortable while being cared for will increase  8/3/2021 2349 by Sarah Salamanca RN  Outcome: Ongoing  8/3/2021 1039 by Chris Nayak RN  Outcome: Ongoing     Problem: Psychomotor Activity - Altered:  Goal: Absence of psychomotor disturbance signs and symptoms  Description: Absence of psychomotor disturbance signs and symptoms  8/3/2021 2349 by Sarah Salamanca RN  Outcome: Ongoing  8/3/2021 1039 by Chris Nayak RN  Outcome: Ongoing     Problem: Sensory Perception - Impaired:  Goal: Demonstrations of improved sensory functioning will increase  Description: Demonstrations of improved sensory functioning will increase  8/3/2021 2349 by Sarah Salamanca RN  Outcome: Ongoing  8/3/2021 1039 by Chris Nayak RN  Outcome: Ongoing  Goal: Decrease in sensory misperception frequency  Description: Decrease in sensory misperception frequency  8/3/2021 2349 by Sarah Salamanca RN  Outcome: Ongoing  8/3/2021 1039 by Chris Nayak RN  Outcome: Ongoing  Goal: Able to refrain from responding to false sensory perceptions  Description: Able to refrain from responding to false sensory perceptions  8/3/2021 2349 by Sarah Salamanca RN  Outcome: Ongoing  8/3/2021 1039 by Brunilda Stevenson RN  Outcome: Ongoing  Goal: Demonstrates accurate environmental perceptions  Description: Demonstrates accurate environmental perceptions  8/3/2021 2349 by Pancho Darby RN  Outcome: Ongoing  8/3/2021 1039 by Brunilda Steevnson RN  Outcome: Ongoing  Goal: Able to distinguish between reality-based and nonreality-based thinking  Description: Able to distinguish between reality-based and nonreality-based thinking  8/3/2021 2349 by Pancho Darby RN  Outcome: Ongoing  8/3/2021 1039 by Brunilda Stevenson RN  Outcome: Ongoing  Goal: Able to interrupt nonreality-based thinking  Description: Able to interrupt nonreality-based thinking  8/3/2021 2349 by Pancho Darby RN  Outcome: Ongoing  8/3/2021 1039 by Brunilda Stevenson RN  Outcome: Ongoing     Problem: Sleep Pattern Disturbance:  Goal: Appears well-rested  Description: Appears well-rested  8/3/2021 2349 by Pancho Darby RN  Outcome: Ongoing  8/3/2021 1039 by Brunilda Stevenson RN  Outcome: Ongoing

## 2021-08-04 NOTE — GROUP NOTE
Group Therapy Note    Date: 8/4/2021    Group Start Time: 0830  Group End Time: 3372    Number of Participants: 3/4    Type: Morning Goals Group/ Community Meeting    Group Topic/Objective: Set Goal For The Day and to review Unit Rules and Regulations. Notes:  Pt sleeping and not waking up for staff.     Discipline Responsible: Certified Therapeutic Recreation Specialist     Electronically signed by Speedy Bass, [address] [place] [address] Umm Gregory on 8/4/2021 at 8:57 AM

## 2021-08-04 NOTE — GROUP NOTE
Group Therapy Note    Date: 8/4/2021    Group Start Time: 0502  Group End Time: 1600     Number of Participants: 3/4    Type: Exercise/Recreation Group    Group Topic/Objective: Chair Exercises    Notes:  Pt attended group, but refused to participate. Pt did not respond to prompting or encouragement.     Status After Intervention:  Unchanged    Participation Level: None    Participation Quality: Resistant    Speech:  pressured    Thought Process/Content: Logical    Affective Functioning: Blunted    Mood: Blunted    Level of consciousness:  Inattentive    Response to Learning: Resistant    Endings: None Reported    Modes of Intervention: Activity and Movement    Discipline Responsible: Certified Therapeutic Recreation Specialist     Electronically signed by SWATI Melo MA on 8/5/2021 at 8:59 AM

## 2021-08-04 NOTE — GROUP NOTE
Group Therapy Note    Date: 8/4/2021    Group Start Time: 3992  Group End Time: 9992  Group Topic: Psychotherapy    [address]    SIMÓN Banuelos        Group Therapy Note    Attendees:4/4         Notes:   Patient attended group with focus on gratitude and putting it into practice. Status After Intervention:  Improved    Participation Level:  Active Listener and Minimal    Participation Quality: Sharing      Speech:  pressured      Thought Process/Content: Logical      Affective Functioning: Congruent      Mood: euthymic      Level of consciousness:  Alert and Attentive      Response to Learning: Able to verbalize current knowledge/experience      Endings: None Reported    Modes of Intervention: Support, Socialization and Exploration      Discipline Responsible: /Counselor      Signature:  SIMÓN Velez

## 2021-08-04 NOTE — GROUP NOTE
Group Therapy Note    Date: 8/4/2021    Group Start Time: 1100  Group End Time: 3658  Group Topic: Psychoeducation    [address] [place], [place]        Group Therapy Note    Attendees: 4/4       Notes:  Pts participated in recreational therapy group; Reminiscence [place]. Pts were given various questions r/t 62s. Topics included tv shows, movies, music, actors/actress, and cartoons. Purpose was to encourage reminiscence discussion. Pt attended group, but refused to participate. Pt did not respond to prompting/encouragement.      Status After Intervention:  Unchanged    Participation Level: None    Participation Quality: Resistant      Speech:  pressured      Thought Process/Content: Logical      Affective Functioning: Blunted      Mood: Blunted      Level of consciousness:  Alert and Attentive      Response to Learning: Able to verbalize current knowledge/experience and Able to verbalize/acknowledge new learning      Endings: None Reported    Modes of Intervention: Support, Socialization, Exploration and Activity      Discipline Responsible: Certified Therapeutic Recreation Specialist       Signature:  Cici Jacobs, [address] [place] [address]

## 2021-08-04 NOTE — BH NOTE
Pt was visible on the milieu. She spent time sitting alone looking at magazines. Cooperative and polite. Compliant with medication given whole in pudding. She ate several snacks. Pt went to her room and as nurse was rounding on pt's she was found sitting in her room and was tearful. She denied depression, anxiety, SI/HI and hallucinations. She was gesturing and whispering to her nurse as if she did not want anyone to hear while looking across the room. She appeared to be responding to internal stimuli. She got into bed and soon came out of her room to the nurses station stating that she did not want to be alone in her room because someone was trying to kill her. PRN haldol and ativan given at 23:24. She was asleep by midnight. She had an episode of incontinence at 04:45 and needed a complete linen change.

## 2021-08-05 PROCEDURE — 99232 SBSQ HOSP IP/OBS MODERATE 35: CPT | Performed by: PSYCHIATRY & NEUROLOGY

## 2021-08-05 PROCEDURE — 6370000000 HC RX 637 (ALT 250 FOR IP): Performed by: PSYCHIATRY & NEUROLOGY

## 2021-08-05 PROCEDURE — 1240000000 HC EMOTIONAL WELLNESS R&B

## 2021-08-05 PROCEDURE — 6370000000 HC RX 637 (ALT 250 FOR IP): Performed by: PHYSICIAN ASSISTANT

## 2021-08-05 RX ADMIN — CHOLECALCIFEROL TAB 125 MCG (5000 UNIT) 5000 UNITS: 125 TAB at 08:11

## 2021-08-05 RX ADMIN — ATORVASTATIN CALCIUM 20 MG: 20 TABLET, FILM COATED ORAL at 08:11

## 2021-08-05 RX ADMIN — OLANZAPINE 10 MG: 10 TABLET ORAL at 21:23

## 2021-08-05 RX ADMIN — DIVALPROEX SODIUM 500 MG: 125 CAPSULE ORAL at 08:10

## 2021-08-05 RX ADMIN — OXYBUTYNIN CHLORIDE 5 MG: 5 TABLET, EXTENDED RELEASE ORAL at 08:11

## 2021-08-05 RX ADMIN — LEVOTHYROXINE SODIUM 25 MCG: 25 TABLET ORAL at 07:55

## 2021-08-05 RX ADMIN — LOSARTAN POTASSIUM 100 MG: 100 TABLET, FILM COATED ORAL at 08:10

## 2021-08-05 RX ADMIN — BENZTROPINE MESYLATE 1 MG: 1 TABLET ORAL at 08:11

## 2021-08-05 RX ADMIN — AMLODIPINE BESYLATE 5 MG: 5 TABLET ORAL at 08:10

## 2021-08-05 RX ADMIN — POTASSIUM CHLORIDE 10 MEQ: 10 TABLET, EXTENDED RELEASE ORAL at 08:11

## 2021-08-05 RX ADMIN — TRAZODONE HYDROCHLORIDE 50 MG: 50 TABLET ORAL at 21:23

## 2021-08-05 RX ADMIN — BENZTROPINE MESYLATE 1 MG: 1 TABLET ORAL at 21:23

## 2021-08-05 ASSESSMENT — PAIN SCALES - GENERAL: PAINLEVEL_OUTOF10: 0

## 2021-08-05 NOTE — GROUP NOTE
Group Therapy Note    Date: 8/5/2021    Group Start Time: 0830  Group End Time: 0900    Number of Participants: 5/5    Type: Morning Goals Group/ Community Meeting    Group Topic/Objective: Set Goal For The Day and to review Unit Rules and Regulations. Patient's Goal:  Pt refused to give a goal.     Notes:  Pt states she is feeling \"ok\" and is grateful for \"I'm alive. \"  Pt reports restful sleep of approximately 6-7 hours. Depression (0-10): 5    Anxiety (0-10): 4    Irritability/Aggitation (0-10): 4    Status After Intervention:  Improved    Participation Level:  Active Listener and Interactive    Participation Quality: Appropriate, Attentive and Sharing    Speech:  pressured    Thought Process/Content: Logical    Affective Functioning: Blunted    Mood: Blunted    Level of consciousness:  Alert and Attentive    Response to Learning: Able to verbalize current knowledge/experience and Able to verbalize/acknowledge new learning    Endings: None Reported    Modes of Intervention: Education, Support and Socialization    Discipline Responsible: Certified Therapeutic Recreation Specialist     Electronically signed by Pam Villarreal MA on 8/5/2021 at 10:12 AM

## 2021-08-05 NOTE — PROGRESS NOTES
Obdulio Sharpe was compliant with all medications this evening. Her involuntary tongue movements seem to be improving, from this RN's observations. She is still difficult to understand when she speaks. Her mood is labile, she is smiling one minute and crying the next. This RN asked her why she was crying, she talked a lot and pointed but I could not understand her. She is using a walker but is mostly independent. Denies SI/HI/AVH. She has been sleeping well, but sat up one time and was crying, (this RN could not tell if she was awake or dreaming). She layed back down after a few minutes.

## 2021-08-05 NOTE — PLAN OF CARE
Problem: Skin Integrity:  Goal: Will show no infection signs and symptoms  Description: Will show no infection signs and symptoms  Outcome: Ongoing  Goal: Absence of new skin breakdown  Description: Absence of new skin breakdown  Outcome: Ongoing     Problem: Altered Mood, Depressive Behavior:  Goal: Absence of self-harm  Description: Absence of self-harm  Outcome: Ongoing  Goal: Patient specific goal  Description: Patient specific goal  Outcome: Ongoing     Problem: Altered Mood, Deterioration in Function:  Goal: Ability to perform activities of daily living will improve  Description: Ability to perform activities of daily living will improve  Outcome: Ongoing  Goal: Able to verbalize reality based thinking  Description: Able to verbalize reality based thinking  Outcome: Ongoing  Goal: Skin appearance normal  Description: Skin appearance normal  Outcome: Ongoing  Goal: Maintenance of adequate nutrition will improve  Description: Maintenance of adequate nutrition will improve  Outcome: Ongoing  Goal: Ability to tolerate increased activity will improve  Description: Ability to tolerate increased activity will improve  Outcome: Ongoing  Goal: Participates in care planning  Description: Participates in care planning  Outcome: Ongoing  Goal: Patient specific goal  Description: Patient specific goal  Outcome: Ongoing     Problem: Falls - Risk of:  Goal: Will remain free from falls  Description: Will remain free from falls  Outcome: Ongoing  Goal: Absence of physical injury  Description: Absence of physical injury  Outcome: Ongoing     Problem: Confusion - Acute:  Goal: Absence of continued neurological deterioration signs and symptoms  Description: Absence of continued neurological deterioration signs and symptoms  Outcome: Ongoing  Goal: Mental status will be restored to baseline  Description: Mental status will be restored to baseline  Outcome: Ongoing     Problem: Discharge Planning:  Goal: Ability to perform activities of daily living will improve  Description: Ability to perform activities of daily living will improve  Outcome: Ongoing  Goal: Participates in care planning  Description: Participates in care planning  Outcome: Ongoing     Problem: Injury - Risk of, Physical Injury:  Goal: Will remain free from falls  Description: Will remain free from falls  Outcome: Ongoing  Goal: Absence of physical injury  Description: Absence of physical injury  Outcome: Ongoing     Problem: Mood - Altered:  Goal: Mood stable  Description: Mood stable  Outcome: Ongoing  Goal: Absence of abusive behavior  Description: Absence of abusive behavior  Outcome: Ongoing  Goal: Verbalizations of feeling emotionally comfortable while being cared for will increase  Description: Verbalizations of feeling emotionally comfortable while being cared for will increase  Outcome: Ongoing     Problem: Psychomotor Activity - Altered:  Goal: Absence of psychomotor disturbance signs and symptoms  Description: Absence of psychomotor disturbance signs and symptoms  Outcome: Ongoing     Problem: Sensory Perception - Impaired:  Goal: Demonstrations of improved sensory functioning will increase  Description: Demonstrations of improved sensory functioning will increase  Outcome: Ongoing  Goal: Decrease in sensory misperception frequency  Description: Decrease in sensory misperception frequency  Outcome: Ongoing  Goal: Able to refrain from responding to false sensory perceptions  Description: Able to refrain from responding to false sensory perceptions  Outcome: Ongoing  Goal: Demonstrates accurate environmental perceptions  Description: Demonstrates accurate environmental perceptions  Outcome: Ongoing  Goal: Able to distinguish between reality-based and nonreality-based thinking  Description: Able to distinguish between reality-based and nonreality-based thinking  Outcome: Ongoing  Goal: Able to interrupt nonreality-based thinking  Description: Able to interrupt nonreality-based thinking  Outcome: Ongoing     Problem: Sleep Pattern Disturbance:  Goal: Appears well-rested  Description: Appears well-rested  Outcome: Ongoing

## 2021-08-05 NOTE — PROGRESS NOTES
Patient calm and cooperative today. No crying episodes. Speech difficult to understand. Tongue thrusting movements less forceful and frequent. Encourage patient to write her thoughts. When asked to write out her question when seemingly frustrated, wrote \"Young Chasmer\" and states \"he got killed\". Later wrote \"Hina came . Bella Cardoza" - when she wanted to know when her family would visit. Patient toileted throughout day to ensure continence. Appetite good. States she is sleeping well.

## 2021-08-05 NOTE — PROGRESS NOTES
Psychiatric Progress Note    Pearl Vigil  [id number]  08/05/21    CHIEF COMPLAINT: I get upset    HPI: Pearl Vigil is a 81 yo  female who presents for exacerbation of Neurocognitive D/O alzehimers type with behavioral disturbance. Pt noted recent exacarbation of mood and confusion with agitationf. Pt noted she currently feels safe and comfortable on the unit. Pt was in agreement with treatment team.  Pt was polite and cordial during the interview process. 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 \"  Pt noted she is sleeping \"okay. Heddy  last night. \"  Pt noted her apptetite is reduced. Pt rated her depresssion a \"2,\" on a scale of zero to ten with ten being the worst and zero being none. Pt rated her anxiety a \"2,\" on the same scale. Pt denied any thoughts to harm herself or anyone else.   Pt denied any auditory or visiual hallucintations.           Allergies   Allergen Reactions    Penicillins Hives    Sulfa Antibiotics Other (See Comments)     Reaction unknown       Medications Prior to Admission: amLODIPine (NORVASC) 5 MG tablet, Take 5 mg by mouth daily  vitamin D3 (CHOLECALCIFEROL) 125 MCG (5000 UT) TABS tablet, Take 50,000 Units by mouth daily  fenofibrate (TRIGLIDE) 160 MG tablet, Take 160 mg by mouth daily  levothyroxine (SYNTHROID) 25 MCG tablet, Take 25 mcg by mouth daily  losartan (COZAAR) 100 MG tablet, Take 100 mg by mouth daily  oxybutynin (DITROPAN) 5 MG tablet, Take 5 mg by mouth daily  potassium chloride (KLOR-CON) 10 MEQ extended release tablet, Take 10 mEq by mouth daily  simvastatin (ZOCOR) 40 MG tablet, Take 40 mg by mouth daily  venlafaxine (EFFEXOR XR) 75 MG extended release capsule, Take 75 mg by mouth daily    Past Medical History:   Diagnosis Date    Essential hypertension     Hyperlipidemia         Patient Active Problem List   Diagnosis    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 [address]    Hypothyroidism due to acquired atrophy of thyroid    Essential hypertension    Overactive bladder    Hypokalemia    Other hyperlipidemia       Review of Systems    OBJECTIVE  Vital Signs:  Vitals:    08/05/21 0715   BP: 132/70   Pulse: 57   Resp: 18   Temp: 97.8 °F (36.6 °C)   SpO2: 96%       Labs:  No results found for this or any previous visit (from the past 48 hour(s)). PSYCHIATRIC ASSESSMENT / MENTAL STATUS EXAM:   Vitals: Blood pressure 132/70, pulse 57, temperature 97.8 °F (36.6 °C), temperature source Temporal, resp. rate 18, height 5' 2\" (1.575 m), weight 142 lb (64.4 kg), SpO2 96 %. CONSTITUTIONAL:    Appearance: appears stated age. alert and oriented to person, place. no acute distress. Adequate grooming and hygeine. Good eye contact. No prominent physical abnormalities. Attitude: Manner is cooperative and pleasant  Motor: No psychomotor agitation, retardation or abnormal movements noted  Speech: Clearly articulated; normal rate, volume, tone & amount. Language: intact understanding and production  Mood: depressed  Affect: euthymic, full range, non-labile, congruent with mood and content of speech  Thought Production: Spontaneous. Thought Form: linear at times yet Noted tangentiality and circumstantiality with loosening of associations. Thought Content/Perceptions: No NETTIE, noted both AVH, noted delusion  Insight: poor  Judgment: poor  Memory: Immediate, recent, and remote appear impaired, though not formally tested. Attention: maintained throughout interview  Fund of knowledge: Average  Gait/Balance: WNL/WNL         IMPRESSION:   Neurocognitive D/O Alzheimer type with behavioral disturba    Tardive Dyskineasa   Al movements    PLAN:  Psychiatric management:medication initiation and titration, group and individual therapy, safe and theraputic environment.   Status of problem/condition: ?Improving  Medical

## 2021-08-05 NOTE — GROUP NOTE
Group Therapy Note    Date: 8/5/2021    Group Start Time: 1000  Group End Time: 3100    Number of Participants: 4/5    Type: Spirituality    Group Topic/Objective: Religous discussion with [employer]. Notes:  Pt attended group for ~15 minutes. Pt did not engage in discussion. Pt aburuptly left group when meditation was started. Pt did not respond to prompting/encouragement.      Status After Intervention:  Unchanged    Participation Level: None    Participation Quality: Resistant    Speech:  pressured    Thought Process/Content: Logical    Affective Functioning: Blunted    Mood: Blunted    Level of consciousness:  Inattentive    Response to Learning: Resistant    Endings: None Reported    Modes of Intervention: Education, Support and Socialization    Discipline Responsible: Certified Therapeutic Recreation Specialist     Electronically signed by Lynette Rehman MA on 8/5/2021 at 10:52 AM

## 2021-08-05 NOTE — GROUP NOTE
Group Therapy Note    Date: 8/5/2021    Group Start Time: 1430  Group End Time: 5154  Group Topic: Psychotherapy    [address] [facility]    SIMÓN Valdez        Group Therapy Note    Attendees: 4/4        Notes:  Patient attended and participated in a group with a focus on reminiscing. Status After Intervention:  Improved    Participation Level:  Active Listener and Minimal    Participation Quality: Attentive and Sharing      Speech:  slurred      Thought Process/Content: Logical      Affective Functioning: Congruent      Mood: euthymic      Level of consciousness:  Alert and Attentive      Response to Learning: Able to verbalize current knowledge/experience      Endings: None Reported    Modes of Intervention: Support, Socialization and Exploration      Discipline Responsible: /Counselor      Signature:  SIMÓN Art

## 2021-08-05 NOTE — GROUP NOTE
Group Therapy Note    Date: 8/5/2021    Group Start Time: 1530  Group End Time: 1600    Number of Participants: 3/4    Type: Exercise/Recreation Group    Group Topic/Objective: Chair Exercises    Notes:  Pt attended group and completed ~25% of the exercises. Pt did not respond to prompting/encouragement to participate.      Status After Intervention:  Improved    Participation Level: Minimal    Participation Quality: Resistant    Speech:  pressured    Thought Process/Content: Logical    Affective Functioning: Blunted    Mood: Blunted    Level of consciousness:  Inattentive    Response to Learning: Resistant    Endings: None Reported    Modes of Intervention: Activity and Movement    Discipline Responsible: Certified Therapeutic Recreation Specialist     Electronically signed by Marily Torres [address] [place] [address], MA on 8/5/2021 at 4:27 PM

## 2021-08-06 PROCEDURE — 99233 SBSQ HOSP IP/OBS HIGH 50: CPT | Performed by: NURSE PRACTITIONER

## 2021-08-06 PROCEDURE — 6370000000 HC RX 637 (ALT 250 FOR IP): Performed by: PSYCHIATRY & NEUROLOGY

## 2021-08-06 PROCEDURE — 1240000000 HC EMOTIONAL WELLNESS R&B

## 2021-08-06 PROCEDURE — 6370000000 HC RX 637 (ALT 250 FOR IP): Performed by: PHYSICIAN ASSISTANT

## 2021-08-06 RX ADMIN — OLANZAPINE 10 MG: 10 TABLET ORAL at 21:03

## 2021-08-06 RX ADMIN — BENZTROPINE MESYLATE 1 MG: 1 TABLET ORAL at 21:03

## 2021-08-06 RX ADMIN — POTASSIUM CHLORIDE 10 MEQ: 10 TABLET, EXTENDED RELEASE ORAL at 10:05

## 2021-08-06 RX ADMIN — LORAZEPAM 1 MG: 1 TABLET ORAL at 00:17

## 2021-08-06 RX ADMIN — ATORVASTATIN CALCIUM 20 MG: 20 TABLET, FILM COATED ORAL at 10:05

## 2021-08-06 RX ADMIN — DIVALPROEX SODIUM 500 MG: 125 CAPSULE ORAL at 10:05

## 2021-08-06 RX ADMIN — BENZTROPINE MESYLATE 1 MG: 1 TABLET ORAL at 10:05

## 2021-08-06 RX ADMIN — AMLODIPINE BESYLATE 5 MG: 5 TABLET ORAL at 10:05

## 2021-08-06 RX ADMIN — HALOPERIDOL 5 MG: 5 TABLET ORAL at 00:17

## 2021-08-06 RX ADMIN — OXYBUTYNIN CHLORIDE 5 MG: 5 TABLET, EXTENDED RELEASE ORAL at 10:05

## 2021-08-06 RX ADMIN — CHOLECALCIFEROL TAB 125 MCG (5000 UNIT) 5000 UNITS: 125 TAB at 10:06

## 2021-08-06 RX ADMIN — LEVOTHYROXINE SODIUM 25 MCG: 25 TABLET ORAL at 10:05

## 2021-08-06 RX ADMIN — LOSARTAN POTASSIUM 100 MG: 100 TABLET, FILM COATED ORAL at 10:05

## 2021-08-06 ASSESSMENT — PAIN SCALES - GENERAL: PAINLEVEL_OUTOF10: 0

## 2021-08-06 NOTE — GROUP NOTE
Group Therapy Note    Date: 8/6/2021    Group Start Time: 0825  Group End Time: 0848    Number of Participants: 2/4    Type: Morning Goals Group/ Community Meeting    Group Topic/Objective: Set Goal For The Day and to review Unit Rules and Regulations. Notes:  Pt was asleep during group and did not participate.     Discipline Responsible: Music Therapist-Board Certified    Electronically signed by PRETTY Alvarez on 8/6/2021 at 9:56 AM

## 2021-08-06 NOTE — PLAN OF CARE
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      PATIENT STRENGTHS:  Patient Strengths:Strengths:  (DURAN)  Patient Strengths and Limitations:Limitations:  (DURAN)  Addictive Behavior:Addictive Behavior  In the past 3 months, have you felt or has someone told you that you have a problem with:  :  (DURAN)  Medical Problems:   Past Medical History:   Diagnosis Date    Essential hypertension     Hyperlipidemia        Risk:  Fall RiskTotal: 77  Ermias Scale Ermias Scale Score: 19  BVC Total: 0      Status EXAM:   Status and Exam  Normal: Yes  Facial Expression: Brightened  Affect: Appropriate, Stable  Level of Consciousness: Alert  Mood:Normal: Yes  Mood: Suspicious, Empty  Motor Activity:Normal: No  Motor Activity: Repetitive Acts, Decreased  Interview Behavior: Cooperative  Preception: [place] to Person, [place] to Time, [place] to Place  Attention:Normal: No  Attention: Distractible  Thought Processes: Circumstantial, Loose Assoc. Thought Content:Normal: No  Thought Content: Poverty of Content  Hallucinations: Visual (Comment)  Delusions: No  Delusions:  (none reported or observed)  Memory:Normal:  (DURAN)  Memory: Other(See comment) (DUARN, limited communication)  Insight and Judgment: No  Insight and Judgment: Poor Insight  Present Suicidal Ideation: No  Present Homicidal Ideation: No    Daily Assessment Last Entry:   Daily Sleep (WDL): Within Defined Limits  Patient Currently in Pain: Denies  Daily Nutrition (WDL): Within Defined Limits    Patient Monitoring:  Frequency of Checks: 4 times per hour, close    Psychiatric Symptoms:   Depression Symptoms  Depression Symptoms: No problems reported or observed.   Anxiety Symptoms  Anxiety Symptoms: No problems reported or observed. Jane Symptoms  Jane Symptoms: No problems reported or observed. Psychosis Symptoms  Delusion Type: No problems reported or observed.     Suicide Risk CSSR-S:  1) Within the past month, have you wished you were dead or wished you could go to sleep and not wake up? : No  2) Have you actually had any thoughts of killing yourself? : No  6) Have you ever done anything, started to do anything, or prepared to do anything to end your life?: No          EDUCATION:   Learner Progress Toward Treatment Goals: Reviewed results and recommendations of this team and Reviewed group plan and strategies    Method: Individual    Outcome: Needs reinforcement    PATIENT GOALS: return to home    PLAN/TREATMENT RECOMMENDATIONS UPDATE: med adjustment, encourage group programming    Estimated Length of Stay Update:  17 days   Estimated Discharge Date Update: 8/9/21  Discharge Criteria: med adjustment      SHORT-TERM GOALS UPDATE:  Time frame for Short-Term Goals: 17 days     LONG-TERM GOALS UPDATE:  Time frame for Long-Term Goals: 17 days  Members Present in Team Meeting: See Signature Sheet    SIMÓN Garcia

## 2021-08-06 NOTE — GROUP NOTE
Group Therapy Note    Date: 8/6/2021    Group Start Time: 7085  Group End Time: 1100    Number of Participants: 3/4    Notes:  Pt was asleep and per discussion with nurse, pt was to remain asleep.      Discipline Responsible: Music Therapist-Board Certified    Electronically signed by PRETTY Kulkarni Junior on 8/6/2021 at 11:21 AM

## 2021-08-06 NOTE — PLAN OF CARE
improve  Description: Ability to tolerate increased activity will improve  Outcome: Ongoing  Goal: Participates in care planning  Description: Participates in care planning  Outcome: Ongoing  Goal: Patient specific goal  Description: Patient specific goal  Outcome: Ongoing     Problem: Falls - Risk of:  Goal: Will remain free from falls  Description: Will remain free from falls  Outcome: Ongoing  Goal: Absence of physical injury  Description: Absence of physical injury  Outcome: Ongoing     Problem: Confusion - Acute:  Goal: Absence of continued neurological deterioration signs and symptoms  Description: Absence of continued neurological deterioration signs and symptoms  Outcome: Ongoing  Goal: Mental status will be restored to baseline  Description: Mental status will be restored to baseline  Outcome: Ongoing     Problem: Discharge Planning:  Goal: Ability to perform activities of daily living will improve  Description: Ability to perform activities of daily living will improve  Outcome: Ongoing  Goal: Participates in care planning  Description: Participates in care planning  Outcome: Ongoing     Problem: Injury - Risk of, Physical Injury:  Goal: Will remain free from falls  Description: Will remain free from falls  Outcome: Ongoing  Goal: Absence of physical injury  Description: Absence of physical injury  Outcome: Ongoing     Problem: Mood - Altered:  Goal: Mood stable  Description: Mood stable  Outcome: Ongoing  Goal: Absence of abusive behavior  Description: Absence of abusive behavior  Outcome: Ongoing  Goal: Verbalizations of feeling emotionally comfortable while being cared for will increase  Description: Verbalizations of feeling emotionally comfortable while being cared for will increase  Outcome: Ongoing     Problem: Psychomotor Activity - Altered:  Goal: Absence of psychomotor disturbance signs and symptoms  Description: Absence of psychomotor disturbance signs and symptoms  Outcome: Ongoing     Problem: Sensory Perception - Impaired:  Goal: Demonstrations of improved sensory functioning will increase  Description: Demonstrations of improved sensory functioning will increase  Outcome: Ongoing  Goal: Decrease in sensory misperception frequency  Description: Decrease in sensory misperception frequency  Outcome: Ongoing  Goal: Able to refrain from responding to false sensory perceptions  Description: Able to refrain from responding to false sensory perceptions  Outcome: Ongoing  Goal: Demonstrates accurate environmental perceptions  Description: Demonstrates accurate environmental perceptions  Outcome: Ongoing  Goal: Able to distinguish between reality-based and nonreality-based thinking  Description: Able to distinguish between reality-based and nonreality-based thinking  Outcome: Ongoing  Goal: Able to interrupt nonreality-based thinking  Description: Able to interrupt nonreality-based thinking  Outcome: Ongoing     Problem: Sleep Pattern Disturbance:  Goal: Appears well-rested  Description: Appears well-rested  Outcome: Ongoing

## 2021-08-06 NOTE — PROGRESS NOTES
Psychiatric Progress Note    Celia Gonzalez  [id number]  08/06/21    CHIEF COMPLAINT: I get upset    HPI: Celia Gonzalez is a 79 yo  female who presents for exacerbation of Neurocognitive D/O Alzheimer type with behavioral disturbance. Pt noted recent exacerbation of mood and confusion with agitation. Pt noted she currently feels safe and comfortable on the unit. Pt was in agreement with treatment team.  Pt was polite and cordial during the interview process. 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 Her bedside RN reports no SI/HI or AV hallucinations. No report of depression or anxiety. Appetite adequate. Farrukh Eunice asleep in the early morning hours and has slept for 6 hours so far. Patient has been expressing a fear that there is someone in her room who wants to hurt her, so Zyprexa was increased. Lavada Rase has been very effective in decreasing symptoms of tardive dyskinesia (tongue thrust). Patient has been compliant with medications. No report of any medication side effects. RN reports patient appears comfortable on the unit.     Allergies   Allergen Reactions    Penicillins Hives    Sulfa Antibiotics Other (See Comments)     Reaction unknown       Medications Prior to Admission: amLODIPine (NORVASC) 5 MG tablet, Take 5 mg by mouth daily  vitamin D3 (CHOLECALCIFEROL) 125 MCG (5000 UT) TABS tablet, Take 50,000 Units by mouth daily  fenofibrate (TRIGLIDE) 160 MG tablet, Take 160 mg by mouth daily  levothyroxine (SYNTHROID) 25 MCG tablet, Take 25 mcg by mouth daily  losartan (COZAAR) 100 MG tablet, Take 100 mg by mouth daily  oxybutynin (DITROPAN) 5 MG tablet, Take 5 mg by mouth daily  potassium chloride (KLOR-CON) 10 MEQ extended release tablet, Take 10 mEq by mouth daily  simvastatin (ZOCOR) 40 MG tablet, Take 40 mg by mouth daily  venlafaxine [facility] XR) 75 MG extended release capsule, Take 75 mg by mouth daily    Past Medical History:   Diagnosis Date    Essential hypertension     Hyperlipidemia         Patient Active Problem List   Diagnosis    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 PSYCHIATRIC ASSESSMENT / MENTAL STATUS EXAM:   Vitals: Blood pressure 136/81, pulse 82, temperature 97.7 °F (36.5 °C), temperature source Temporal, resp. rate 18, height 5' 2\" (1.575 m), weight 142 lb (64.4 kg), SpO2 97 %. CONSTITUTIONAL:    Appearance: Appears stated age. Drowsy unable to assess orientation. Adequate grooming and hygiene. Good eye contact. No prominent physical abnormalities. Attitude: Manner is cooperative and pleasant per report  Motor: No psychomotor agitation, retardation or abnormal movements noted  Speech: Articulation improving with decreased tongue thrust; normal rate, volume, tone & amount. Language: intact understanding and production  Mood: euthymic by report  Affect: euthymic, full range, non-labile, congruent with mood and content of speech  Thought Production: Spontaneous. Thought Form: linear at times yet Noted tangentiality and circumstantiality with loosening of associations. Thought Content/Perceptions: No NETTIE, noted both AVH, noted delusion  Insight: poor  Judgment: poor  Memory: Immediate, recent, and remote appear impaired, though not formally tested.   Attention: maintained throughout interview  Fund of knowledge: Average  Gait/Balance: not assessed    IMPRESSION:   Neurocognitive D/O Alzheimer type with behavioral disturbance    Tardive Dyskinesia   Al movements    PLAN:  Psychiatric management:medication initiation and titration, group and individual therapy, safe and therapeutic environment. Status of problem/condition: Improving  Medical co-morbidities: Management per [facility] group, appreciate assistance  Legal Status: Pending  Disposition:estimated LOS: Pending  The treatment team reviewed with the patient the diagnosis and treatment recommendations to include the risks, benefits, and side effects of chosen medications. The patient verbalized understanding and agreed with the treatment regimen as outlined above. The patient was encouraged to participate in groups. Medical records, Labs, Diagnotic tests reviewed  q15 min safety checks for safety  Interval History  Review current labs - check CMP & CBC in am  Continue current medications   Supportive Therapy Provided  Pt had an opportunity to ask questions and address concerns  Pt encouraged to continue therapy group or individual.  Pt was in agreement with treatment plan. The risks benefits and side effects of medications were discussed with the patient, including alternatives and no treatment.     Electronically signed by GURDEEP Rayo CNP on 8/6/2021 at 11:19 AM

## 2021-08-06 NOTE — PROGRESS NOTES
Pt has been lethargic during the shift due to lack of sleep overnight and needing PRN medications. Pt slept past breakfast but was awake for lunch. Pt showered today, needing minimal assist from staff. Pt returned to room after lunch to read a book. Pt returned to bed shortly after to sleep. Pt is compliant with meds and assessment, denying SI/HI/AVH along with anxiety and depression. Pt was tearful during shower stating she is ready to go home. Pt is isolative to room and does not interact with peers on the unit. Pt's involuntary tongue movements continue to improve.

## 2021-08-06 NOTE — BH NOTE
Pt was visible on the milieu. Spending time looking at magazines and rearranging the activities corner. Later she watched TV. She ambulates with a walker and is steady. She was compliant with medication. She is difficult, at times, to understand what she is saying due to her non-stop tongue movements and whispering. She gestures and whispers in a conspiratorial manner as she looks at a certain area of the room. The involuntary tongue movements seem to be improving somewhat. Pt was up late, assisted to bed and a short time later came out on the milieu because she was afraid ot be in here room alone. She was anxious and afraid. She appeared to be responding to IS. PRN ativan and haldol was given as ordered. She has been continent this shift and is able to let staff know what she needs. Call light within reach.

## 2021-08-07 LAB
ALBUMIN SERPL-MCNC: 3.4 GM/DL (ref 3.4–5)
ALP BLD-CCNC: 61 IU/L (ref 40–129)
ALT SERPL-CCNC: 11 U/L (ref 10–40)
ANION GAP SERPL CALCULATED.3IONS-SCNC: 11 MMOL/L (ref 4–16)
AST SERPL-CCNC: 14 IU/L (ref 15–37)
BASOPHILS ABSOLUTE: 0 K/CU MM
BASOPHILS RELATIVE PERCENT: 0.5 % (ref 0–1)
BILIRUB SERPL-MCNC: 0.3 MG/DL (ref 0–1)
BUN BLDV-MCNC: 25 MG/DL (ref 6–23)
CALCIUM SERPL-MCNC: 9.8 MG/DL (ref 8.3–10.6)
CHLORIDE BLD-SCNC: 106 MMOL/L (ref 99–110)
CO2: 25 MMOL/L (ref 21–32)
CREAT SERPL-MCNC: 1 MG/DL (ref 0.6–1.1)
DIFFERENTIAL TYPE: ABNORMAL
EOSINOPHILS ABSOLUTE: 0.1 K/CU MM
EOSINOPHILS RELATIVE PERCENT: 1.7 % (ref 0–3)
GFR AFRICAN AMERICAN: >60 ML/MIN/1.73M2
GFR NON-AFRICAN AMERICAN: 55 ML/MIN/1.73M2
GLUCOSE BLD-MCNC: 86 MG/DL (ref 70–99)
HCT VFR BLD CALC: 38.5 % (ref 37–47)
HEMOGLOBIN: 11.9 GM/DL (ref 12.5–16)
IMMATURE NEUTROPHIL %: 0.2 % (ref 0–0.43)
LYMPHOCYTES ABSOLUTE: 1.6 K/CU MM
LYMPHOCYTES RELATIVE PERCENT: 26.9 % (ref 24–44)
MCH RBC QN AUTO: 28.9 PG (ref 27–31)
MCHC RBC AUTO-ENTMCNC: 30.9 % (ref 32–36)
MCV RBC AUTO: 93.4 FL (ref 78–100)
MONOCYTES ABSOLUTE: 0.5 K/CU MM
MONOCYTES RELATIVE PERCENT: 8.8 % (ref 0–4)
PDW BLD-RTO: 12.6 % (ref 11.7–14.9)
PLATELET # BLD: 190 K/CU MM (ref 140–440)
PMV BLD AUTO: 11.6 FL (ref 7.5–11.1)
POTASSIUM SERPL-SCNC: 4.4 MMOL/L (ref 3.5–5.1)
RBC # BLD: 4.12 M/CU MM (ref 4.2–5.4)
SEGMENTED NEUTROPHILS ABSOLUTE COUNT: 3.6 K/CU MM
SEGMENTED NEUTROPHILS RELATIVE PERCENT: 61.9 % (ref 36–66)
SODIUM BLD-SCNC: 142 MMOL/L (ref 135–145)
TOTAL IMMATURE NEUTOROPHIL: 0.01 K/CU MM
TOTAL PROTEIN: 5.5 GM/DL (ref 6.4–8.2)
WBC # BLD: 5.8 K/CU MM (ref 4–10.5)

## 2021-08-07 PROCEDURE — 36415 COLL VENOUS BLD VENIPUNCTURE: CPT

## 2021-08-07 PROCEDURE — 80053 COMPREHEN METABOLIC PANEL: CPT

## 2021-08-07 PROCEDURE — 6370000000 HC RX 637 (ALT 250 FOR IP): Performed by: PSYCHIATRY & NEUROLOGY

## 2021-08-07 PROCEDURE — 82140 ASSAY OF AMMONIA: CPT

## 2021-08-07 PROCEDURE — 99232 SBSQ HOSP IP/OBS MODERATE 35: CPT | Performed by: PSYCHIATRY & NEUROLOGY

## 2021-08-07 PROCEDURE — 80048 BASIC METABOLIC PNL TOTAL CA: CPT

## 2021-08-07 PROCEDURE — 85025 COMPLETE CBC W/AUTO DIFF WBC: CPT

## 2021-08-07 PROCEDURE — 80164 ASSAY DIPROPYLACETIC ACD TOT: CPT

## 2021-08-07 PROCEDURE — 6370000000 HC RX 637 (ALT 250 FOR IP): Performed by: PHYSICIAN ASSISTANT

## 2021-08-07 PROCEDURE — 1240000000 HC EMOTIONAL WELLNESS R&B

## 2021-08-07 RX ADMIN — LOSARTAN POTASSIUM 100 MG: 100 TABLET, FILM COATED ORAL at 08:39

## 2021-08-07 RX ADMIN — LEVOTHYROXINE SODIUM 25 MCG: 25 TABLET ORAL at 06:04

## 2021-08-07 RX ADMIN — CHOLECALCIFEROL TAB 125 MCG (5000 UNIT) 5000 UNITS: 125 TAB at 08:39

## 2021-08-07 RX ADMIN — POTASSIUM CHLORIDE 10 MEQ: 10 TABLET, EXTENDED RELEASE ORAL at 08:39

## 2021-08-07 RX ADMIN — OLANZAPINE 15 MG: 10 TABLET ORAL at 21:17

## 2021-08-07 RX ADMIN — AMLODIPINE BESYLATE 5 MG: 5 TABLET ORAL at 08:39

## 2021-08-07 RX ADMIN — OXYBUTYNIN CHLORIDE 5 MG: 5 TABLET, EXTENDED RELEASE ORAL at 08:39

## 2021-08-07 RX ADMIN — ATORVASTATIN CALCIUM 20 MG: 20 TABLET, FILM COATED ORAL at 08:39

## 2021-08-07 RX ADMIN — DIVALPROEX SODIUM 500 MG: 125 CAPSULE ORAL at 08:39

## 2021-08-07 RX ADMIN — TRAZODONE HYDROCHLORIDE 50 MG: 50 TABLET ORAL at 21:18

## 2021-08-07 RX ADMIN — BENZTROPINE MESYLATE 1 MG: 1 TABLET ORAL at 21:18

## 2021-08-07 RX ADMIN — BENZTROPINE MESYLATE 1 MG: 1 TABLET ORAL at 08:39

## 2021-08-07 ASSESSMENT — PAIN SCALES - GENERAL
PAINLEVEL_OUTOF10: 0
PAINLEVEL_OUTOF10: 0

## 2021-08-07 NOTE — GROUP NOTE
Group Therapy Note    Date: 8/7/2021    Group Start Time: 1500  Group End Time: 3775    Number of Participants: 5/6    Type: Exercise/Recreation Group    Group Topic/Objective: Chair Exercises    Notes:  Pt was present during group but only completed 1 chair exercise. Pt was asked how her day has been and pt said \"good. \" Pt was asked to say what made her day good and she shrugged.     Status After Intervention:  Improved    Participation Level: Minimal    Participation Quality: Minimal participation     Speech:  normal    Thought Process/Content: Minimal participation     Affective Functioning: Flat    Mood: Flat    Level of consciousness:  Alert    Response to Learning: Minimal participation    Endings: None Reported    Modes of Intervention: Support, Socialization and Movement    Discipline Responsible: Music Therapist-Board Certified    Electronically signed by PRETTY Lemon on 8/7/2021 at 4:17 PM

## 2021-08-07 NOTE — PROGRESS NOTES
Psychiatric Progress Note    Celia Gonzalez  [id number]  08/07/21    CHIEF COMPLAINT: I get upset    HPI: Celia Gonzalez is a 79 yo  female who presents for exacerbation of Neurocognitive D/O alzehimers type with behavioral disturbance. Pt noted recent exacarbation of mood and confusion with agitationf. Pt noted she currently feels safe and comfortable on the unit. Pt was in agreement with treatment team.  Pt was polite and cordial during the interview process. 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 \"  Pt noted she is sleeping \"okay. Laurie Ashley last night. \"  Pt noted her apptetite is reduced. Pt rated her depresssion a \"2,\" on a scale of zero to ten with ten being the worst and zero being none. Pt rated her anxiety a \"2,\" on the same scale. Pt denied any thoughts to harm herself or anyone else.   Pt continues to note auditory or visiual hallucintations.           Allergies   Allergen Reactions    Penicillins Hives    Sulfa Antibiotics Other (See Comments)     Reaction unknown       Medications Prior to Admission: amLODIPine (NORVASC) 5 MG tablet, Take 5 mg by mouth daily  vitamin D3 (CHOLECALCIFEROL) 125 MCG (5000 UT) TABS tablet, Take 50,000 Units by mouth daily  fenofibrate (TRIGLIDE) 160 MG tablet, Take 160 mg by mouth daily  levothyroxine (SYNTHROID) 25 MCG tablet, Take 25 mcg by mouth daily  losartan (COZAAR) 100 MG tablet, Take 100 mg by mouth daily  oxybutynin (DITROPAN) 5 MG tablet, Take 5 mg by mouth daily  potassium chloride (KLOR-CON) 10 MEQ extended release tablet, Take 10 mEq by mouth daily  simvastatin (ZOCOR) 40 MG tablet, Take 40 mg by mouth daily  venlafaxine (EFFEXOR XR) 75 MG extended release capsule, Take 75 mg by mouth daily    Past Medical History:   Diagnosis Date    Essential hypertension     Hyperlipidemia         Patient Active Problem List   Diagnosis    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 ([facility] [address]    Hypothyroidism due to acquired atrophy of thyroid    Essential hypertension    Overactive bladder    Hypokalemia    Other hyperlipidemia       Review of Systems    OBJECTIVE  Vital Signs:  Vitals:    08/06/21 2007   BP: 105/66   Pulse: 74   Resp: 18   Temp: 98.4 °F (36.9 °C)   SpO2: 96%       Labs:  Recent Results (from the past 48 hour(s))   Comprehensive Metabolic Panel w/ Reflex to MG    Collection Time: 08/07/21  6:00 AM   Result Value Ref Range    Sodium 142 135 - 145 MMOL/L    Potassium 4.4 3.5 - 5.1 MMOL/L    Chloride 106 99 - 110 mMol/L    CO2 25 21 - 32 MMOL/L    BUN 25 (H) 6 - 23 MG/DL    CREATININE 1.0 0.6 - 1.1 MG/DL    Glucose 86 70 - 99 MG/DL    Calcium 9.8 8.3 - 10.6 MG/DL    Albumin 3.4 3.4 - 5.0 GM/DL    Total Protein 5.5 (L) 6.4 - 8.2 GM/DL    Total Bilirubin 0.3 0.0 - 1.0 MG/DL    ALT 11 10 - 40 U/L    AST 14 (L) 15 - 37 IU/L    Alkaline Phosphatase 61 40 - 129 IU/L    GFR Non- 55 (L) >60 mL/min/1.73m2    GFR African American >60 >60 mL/min/1.73m2    Anion Gap 11 4 - 16   CBC auto differential    Collection Time: 08/07/21  6:00 AM   Result Value Ref Range    WBC 5.8 4.0 - 10.5 K/CU MM    RBC 4.12 (L) 4.2 - 5.4 M/CU MM    Hemoglobin 11.9 (L) 12.5 - 16.0 GM/DL    Hematocrit 38.5 37 - 47 %    MCV 93.4 78 - 100 FL    MCH 28.9 27 - 31 PG    MCHC 30.9 (L) 32.0 - 36.0 %    RDW 12.6 11.7 - 14.9 %    Platelets 132 642 - 030 K/CU MM    MPV 11.6 (H) 7.5 - 11.1 FL    Differential Type AUTOMATED DIFFERENTIAL     Segs Relative 61.9 36 - 66 %    Lymphocytes % 26.9 24 - 44 %    Monocytes % 8.8 (H) 0 - 4 %    Eosinophils % 1.7 0 - 3 %    Basophils % 0.5 0 - 1 %    Segs Absolute 3.6 K/CU MM    Lymphocytes Absolute 1.6 K/CU MM    Monocytes Absolute 0.5 K/CU MM    Eosinophils Absolute 0.1 K/CU MM    Basophils Absolute 0.0 K/CU MM    Immature Neutrophil % 0.2 0 - 0.43 %    Total Immature Neutrophil 0.01 K/CU MM groups. Medical records, Labs, Diagnotic tests reviewed  q15 min safety checks for safety  Interval History. Review current labs  Continue current medications   Supportive Therapy Provided  Pt had an opportunity to ask questions and address concerns  Pt encouraged to continue therapy group or individual.  Pt was in agreement with treatment plan. The risks benefits and side effects of medications were discussed with the patient, including alternatives and no treatment.     Electronically signed by David Pollack DO on 8/7/2021 at 11:23 AM

## 2021-08-07 NOTE — GROUP NOTE
Group Therapy Note    Date: 8/7/2021    Group Start Time: 1400  Group End Time: 1435   Music Therapy Group Therapy Note    Number of Participants: 6/6    Notes: MT-BC provided opportunities for decision making, socialization, and increased relaxation. The MT-BC encouraged the pts to use music when they are feeling overwhelmed, stressed out, etc.     Pt became agitated when spoken to, said \"hell no,\" and stood up and walked away. Pt was present for ~5 minutes.     Status After Intervention:  Unchanged    Participation Level: Minimal    Participation Quality: Unable to report d/t pt's minimal participation    Speech:  normal    Thought Process/Content: Logical    Affective Functioning: Flat    Mood: Flat    Level of consciousness:  Alert    Response to Learning: Minimal participation    Endings: None Reported    Modes of Intervention: Education, Support, Socialization and Music    Discipline Responsible: Music Therapist-Board Certified    Electronically signed by PRETTY Lara on 8/7/2021 at 3:31 PM

## 2021-08-07 NOTE — PROGRESS NOTES
Patient up on unit. Brief crying episodes have occurred twice. She pointed to corner with activity supplies. States those things are hers. Upset when other patients touch these things. Patient has good handwriting. Pen and paper given. She yarelis a few nonsensical, non-related works. - including a name and the word \" kill\" She denies SI, HI AVH, SI . Visit with sisters went well. Patient in room crying afterwards and communicated she misses them.

## 2021-08-07 NOTE — GROUP NOTE
Group Therapy Note    Date: 8/7/2021    Group Start Time: 0827  Group End Time: 7998    Number of Participants: 5/6    Type: Morning Goals Group/ Community Meeting    Group Topic/Objective: Set Goal For The Day and to review Unit Rules and Regulations. Notes:  Pt shrugged in response to the MT-BC's first 3 questions before ignoring the remainder of the MT-BC's questions. Depression (0-10): Unable to report d/t pt's minimal participation    Anxiety (0-10): Unable to report d/t pt's minimal participation    Irritability/Aggitation (0-10):  Unable to report d/t pt's minimal participation    Status After Intervention:  Unchanged    Participation Level: Minimal    Participation Quality: Minimal    Speech:  Unable to report d/t pt's minimal participation    Thought Process/Content: Unable to report d/t pt's minimal participation    Affective Functioning: Flat    Mood: flat    Level of consciousness:  Unable to report d/t pt's minimal participation    Response to Learning: Unable to report d/t pt's minimal participation    Endings: None Reported    Modes of Intervention: Education, Support and Socialization    Discipline Responsible: Music Therapist-Board Certified    Electronically signed by PRETTY Reynoso on 8/7/2021 at 8:59 AM

## 2021-08-08 LAB
ALBUMIN SERPL-MCNC: 3.9 GM/DL (ref 3.4–5)
ALP BLD-CCNC: 66 IU/L (ref 40–129)
ALT SERPL-CCNC: 10 U/L (ref 10–40)
AMMONIA: 28 UMOL/L (ref 11–51)
ANION GAP SERPL CALCULATED.3IONS-SCNC: 10 MMOL/L (ref 4–16)
AST SERPL-CCNC: 18 IU/L (ref 15–37)
BILIRUB SERPL-MCNC: 0.4 MG/DL (ref 0–1)
BUN BLDV-MCNC: 22 MG/DL (ref 6–23)
CALCIUM SERPL-MCNC: 10.1 MG/DL (ref 8.3–10.6)
CHLORIDE BLD-SCNC: 102 MMOL/L (ref 99–110)
CO2: 27 MMOL/L (ref 21–32)
CREAT SERPL-MCNC: 0.9 MG/DL (ref 0.6–1.1)
DOSE AMOUNT: NORMAL
DOSE TIME: NORMAL
GFR AFRICAN AMERICAN: >60 ML/MIN/1.73M2
GFR NON-AFRICAN AMERICAN: >60 ML/MIN/1.73M2
GLUCOSE BLD-MCNC: 90 MG/DL (ref 70–99)
POTASSIUM SERPL-SCNC: 3.7 MMOL/L (ref 3.5–5.1)
SODIUM BLD-SCNC: 139 MMOL/L (ref 135–145)
TOTAL PROTEIN: 7.1 GM/DL (ref 6.4–8.2)
VALPROIC ACID LEVEL: 64.6 UG/ML (ref 50–100)

## 2021-08-08 PROCEDURE — 82140 ASSAY OF AMMONIA: CPT

## 2021-08-08 PROCEDURE — 1240000000 HC EMOTIONAL WELLNESS R&B

## 2021-08-08 PROCEDURE — 80053 COMPREHEN METABOLIC PANEL: CPT

## 2021-08-08 PROCEDURE — 99232 SBSQ HOSP IP/OBS MODERATE 35: CPT | Performed by: PSYCHIATRY & NEUROLOGY

## 2021-08-08 PROCEDURE — 6370000000 HC RX 637 (ALT 250 FOR IP): Performed by: PSYCHIATRY & NEUROLOGY

## 2021-08-08 PROCEDURE — 6370000000 HC RX 637 (ALT 250 FOR IP): Performed by: PHYSICIAN ASSISTANT

## 2021-08-08 PROCEDURE — 36415 COLL VENOUS BLD VENIPUNCTURE: CPT

## 2021-08-08 PROCEDURE — 80164 ASSAY DIPROPYLACETIC ACD TOT: CPT

## 2021-08-08 RX ORDER — LORAZEPAM 0.5 MG/1
0.5 TABLET ORAL NIGHTLY
Status: DISCONTINUED | OUTPATIENT
Start: 2021-08-08 | End: 2021-08-11

## 2021-08-08 RX ORDER — DIVALPROEX SODIUM 125 MG/1
500 CAPSULE, COATED PELLETS ORAL NIGHTLY
Status: DISCONTINUED | OUTPATIENT
Start: 2021-08-09 | End: 2021-08-12 | Stop reason: HOSPADM

## 2021-08-08 RX ORDER — DIVALPROEX SODIUM 125 MG/1
250 CAPSULE, COATED PELLETS ORAL DAILY
Status: DISCONTINUED | OUTPATIENT
Start: 2021-08-08 | End: 2021-08-12 | Stop reason: HOSPADM

## 2021-08-08 RX ADMIN — BENZTROPINE MESYLATE 1 MG: 1 TABLET ORAL at 20:13

## 2021-08-08 RX ADMIN — NYSTATIN 500000 UNITS: 100000 SUSPENSION ORAL at 13:00

## 2021-08-08 RX ADMIN — AMLODIPINE BESYLATE 5 MG: 5 TABLET ORAL at 08:06

## 2021-08-08 RX ADMIN — LORAZEPAM 0.5 MG: 0.5 TABLET ORAL at 20:13

## 2021-08-08 RX ADMIN — OLANZAPINE 15 MG: 10 TABLET ORAL at 20:13

## 2021-08-08 RX ADMIN — OXYBUTYNIN CHLORIDE 5 MG: 5 TABLET, EXTENDED RELEASE ORAL at 08:06

## 2021-08-08 RX ADMIN — DIVALPROEX SODIUM 500 MG: 125 CAPSULE ORAL at 08:05

## 2021-08-08 RX ADMIN — NYSTATIN 500000 UNITS: 100000 SUSPENSION ORAL at 16:51

## 2021-08-08 RX ADMIN — HALOPERIDOL 5 MG: 5 TABLET ORAL at 20:13

## 2021-08-08 RX ADMIN — CHOLECALCIFEROL TAB 125 MCG (5000 UNIT) 5000 UNITS: 125 TAB at 08:06

## 2021-08-08 RX ADMIN — POTASSIUM CHLORIDE 10 MEQ: 10 TABLET, EXTENDED RELEASE ORAL at 08:06

## 2021-08-08 RX ADMIN — LEVOTHYROXINE SODIUM 25 MCG: 25 TABLET ORAL at 05:45

## 2021-08-08 RX ADMIN — NYSTATIN 500000 UNITS: 100000 SUSPENSION ORAL at 20:13

## 2021-08-08 RX ADMIN — LOSARTAN POTASSIUM 100 MG: 100 TABLET, FILM COATED ORAL at 08:06

## 2021-08-08 RX ADMIN — BENZTROPINE MESYLATE 1 MG: 1 TABLET ORAL at 08:06

## 2021-08-08 RX ADMIN — ATORVASTATIN CALCIUM 20 MG: 20 TABLET, FILM COATED ORAL at 08:06

## 2021-08-08 ASSESSMENT — PAIN SCALES - GENERAL: PAINLEVEL_OUTOF10: 0

## 2021-08-08 NOTE — PROGRESS NOTES
Psychiatric Progress Note    Sunshine Morrison  [id number]  08/08/21    CHIEF COMPLAINT: I get upset    HPI: Sunshine Morrison is a 79 yo  female who presents for exacerbation of Neurocognitive D/O alzehimers type with behavioral disturbance. Pt noted recent exacarbation of mood and confusion with agitationf. Pt noted she currently feels safe and comfortable on the unit. Pt was in agreement with treatment team.  Pt was polite and cordial during the interview process. 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 \"  Pt noted she is sleeping \"not much last night. . last night. \"  Pt noted her apptetite is reduced. Pt rated her depresssion a \"2,\" on a scale of zero to ten with ten being the worst and zero being none. Pt rated her anxiety a \"1,\" on the same scale. Pt denied any thoughts to harm herself or anyone else.   Pt continues to note auditory or visiual hallucintations.           Allergies   Allergen Reactions    Penicillins Hives    Sulfa Antibiotics Other (See Comments)     Reaction unknown       Medications Prior to Admission: amLODIPine (NORVASC) 5 MG tablet, Take 5 mg by mouth daily  vitamin D3 (CHOLECALCIFEROL) 125 MCG (5000 UT) TABS tablet, Take 50,000 Units by mouth daily  fenofibrate (TRIGLIDE) 160 MG tablet, Take 160 mg by mouth daily  levothyroxine (SYNTHROID) 25 MCG tablet, Take 25 mcg by mouth daily  losartan (COZAAR) 100 MG tablet, Take 100 mg by mouth daily  oxybutynin (DITROPAN) 5 MG tablet, Take 5 mg by mouth daily  potassium chloride (KLOR-CON) 10 MEQ extended release tablet, Take 10 mEq by mouth daily  simvastatin (ZOCOR) 40 MG tablet, Take 40 mg by mouth daily  venlafaxine (EFFEXOR XR) 75 MG extended release capsule, Take 75 mg by mouth daily    Past Medical History:   Diagnosis Date    Essential hypertension     Hyperlipidemia         Patient Active Problem List   Diagnosis    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 ([facility] [address]    Hypothyroidism due to acquired atrophy of thyroid    Essential hypertension    Overactive bladder    Hypokalemia    Other hyperlipidemia       Review of Systems    OBJECTIVE  Vital Signs:  Vitals:    08/08/21 0730   BP: 131/71   Pulse: 71   Resp: 16   Temp: 98.2 °F (36.8 °C)   SpO2: 98%       Labs:  Recent Results (from the past 48 hour(s))   Comprehensive Metabolic Panel w/ Reflex to MG    Collection Time: 08/07/21  6:00 AM   Result Value Ref Range    Sodium 142 135 - 145 MMOL/L    Potassium 4.4 3.5 - 5.1 MMOL/L    Chloride 106 99 - 110 mMol/L    CO2 25 21 - 32 MMOL/L    BUN 25 (H) 6 - 23 MG/DL    CREATININE 1.0 0.6 - 1.1 MG/DL    Glucose 86 70 - 99 MG/DL    Calcium 9.8 8.3 - 10.6 MG/DL    Albumin 3.4 3.4 - 5.0 GM/DL    Total Protein 5.5 (L) 6.4 - 8.2 GM/DL    Total Bilirubin 0.3 0.0 - 1.0 MG/DL    ALT 11 10 - 40 U/L    AST 14 (L) 15 - 37 IU/L    Alkaline Phosphatase 61 40 - 129 IU/L    GFR Non- 55 (L) >60 mL/min/1.73m2    GFR African American >60 >60 mL/min/1.73m2    Anion Gap 11 4 - 16   CBC auto differential    Collection Time: 08/07/21  6:00 AM   Result Value Ref Range    WBC 5.8 4.0 - 10.5 K/CU MM    RBC 4.12 (L) 4.2 - 5.4 M/CU MM    Hemoglobin 11.9 (L) 12.5 - 16.0 GM/DL    Hematocrit 38.5 37 - 47 %    MCV 93.4 78 - 100 FL    MCH 28.9 27 - 31 PG    MCHC 30.9 (L) 32.0 - 36.0 %    RDW 12.6 11.7 - 14.9 %    Platelets 442 409 - 342 K/CU MM    MPV 11.6 (H) 7.5 - 11.1 FL    Differential Type AUTOMATED DIFFERENTIAL     Segs Relative 61.9 36 - 66 %    Lymphocytes % 26.9 24 - 44 %    Monocytes % 8.8 (H) 0 - 4 %    Eosinophils % 1.7 0 - 3 %    Basophils % 0.5 0 - 1 %    Segs Absolute 3.6 K/CU MM    Lymphocytes Absolute 1.6 K/CU MM    Monocytes Absolute 0.5 K/CU MM    Eosinophils Absolute 0.1 K/CU MM    Basophils Absolute 0.0 K/CU MM    Immature Neutrophil % 0.2 0 - 0.43 %    Total Immature Neutrophil 0.01 K/CU MM Ammonia    Collection Time: 08/08/21  6:00 AM   Result Value Ref Range    Ammonia 28 11 - 51 UMOL/L   Comprehensive Metabolic Panel w/ Reflex to MG    Collection Time: 08/08/21  6:00 AM   Result Value Ref Range    Sodium 139 135 - 145 MMOL/L    Potassium 3.7 3.5 - 5.1 MMOL/L    Chloride 102 99 - 110 mMol/L    CO2 27 21 - 32 MMOL/L    BUN 22 6 - 23 MG/DL    CREATININE 0.9 0.6 - 1.1 MG/DL    Glucose 90 70 - 99 MG/DL    Calcium 10.1 8.3 - 10.6 MG/DL    Albumin 3.9 3.4 - 5.0 GM/DL    Total Protein 7.1 6.4 - 8.2 GM/DL    Total Bilirubin 0.4 0.0 - 1.0 MG/DL    ALT 10 10 - 40 U/L    AST 18 15 - 37 IU/L    Alkaline Phosphatase 66 40 - 129 IU/L    GFR Non-African American >60 >60 mL/min/1.73m2    GFR African American >60 >60 mL/min/1.73m2    Anion Gap 10 4 - 16   Valproic acid level, total    Collection Time: 08/08/21  6:00 AM   Result Value Ref Range    Valproic Acid Lvl 64.6 50 - 100 UG/ML    DOSE AMOUNT DOSE AMT. GIVEN - UNKNOWN     DOSE TIME DOSE TIME GIVEN - UNKNOWN        PSYCHIATRIC ASSESSMENT / MENTAL STATUS EXAM:   Vitals: Blood pressure 131/71, pulse 71, temperature 98.2 °F (36.8 °C), temperature source Temporal, resp. rate 16, height 5' 2\" (1.575 m), weight 142 lb (64.4 kg), SpO2 98 %. CONSTITUTIONAL:    Appearance: appears stated age. alert and oriented to person, place. no acute distress. Adequate grooming and hygeine. Good eye contact. No prominent physical abnormalities. Attitude: Manner is cooperative and pleasant  Motor: No psychomotor agitation, retardation or abnormal movements noted  Speech: Clearly articulated; normal rate, volume, tone & amount. Language: intact understanding and production  Mood: depressed  Affect: euthymic, full range, non-labile, congruent with mood and content of speech  Thought Production: Spontaneous. Thought Form: linear at times yet Noted tangentiality and circumstantiality with loosening of associations.   Thought Content/Perceptions: No NETTIE, noted both AVH, noted delusion  Insight: poor  Judgment: poor  Memory: Immediate, recent, and remote appear impaired, though not formally tested. Attention: maintained throughout interview  Fund of knowledge: Average  Gait/Balance: WNL/WNL         IMPRESSION:   Neurocognitive D/O Alzheimer type with behavioral disturba c  Schizophrenia   Tardive Dyskineasa   Al movements    PLAN:  Psychiatric management:medication initiation and titration, group and individual therapy, safe and theraputic environment. Status of problem/condition: ?Improving  Medical co-morbidities: [facility], appreciate assistance  Legal Status:Pending  Disposition:estimated LOS: Pending. The treatment team reviewed with the patient the diagnosis and treatment recommendations to include the risks, benefits, and side effects of chosen medications. The patient verbalized understanding and agreed with the treatment regimen as outlined above. The patient was encouraged to participate in groups. Medical records, Labs, Diagnotic tests reviewed  q15 min safety checks for safety  Interval History. Review current labs  Continue current medications - start ativan 0.5 mg PO QHS for TD and insomina  Supportive Therapy Provided  Pt had an opportunity to ask questions and address concerns  Pt encouraged to continue therapy group or individual.  Pt was in agreement with treatment plan. The risks benefits and side effects of medications were discussed with the patient, including alternatives and no treatment.     Electronically signed by Lyndsey Rey DO on 8/8/2021 at 11:01 AM

## 2021-08-08 NOTE — PLAN OF CARE
Problem: Skin Integrity:  Goal: Will show no infection signs and symptoms  Description: Will show no infection signs and symptoms  8/7/2021 2149 by Rayne Calle LPN  Outcome: Ongoing  8/7/2021 1519 by Mel Monte RN  Outcome: Ongoing  Goal: Absence of new skin breakdown  Description: Absence of new skin breakdown  8/7/2021 2149 by Rayne Calle LPN  Outcome: Ongoing  8/7/2021 1519 by Mel Monte RN  Outcome: Ongoing     Problem: Altered Mood, Depressive Behavior:  Goal: Absence of self-harm  Description: Absence of self-harm  8/7/2021 2149 by Rayne Calle LPN  Outcome: Ongoing  8/7/2021 1519 by Mel Monte RN  Outcome: Ongoing  Goal: Patient specific goal  Description: Patient specific goal  8/7/2021 2149 by Rayne Calle LPN  Outcome: Ongoing  8/7/2021 1519 by Mel Monte RN  Outcome: Ongoing     Problem: Altered Mood, Deterioration in Function:  Goal: Ability to perform activities of daily living will improve  Description: Ability to perform activities of daily living will improve  8/7/2021 2149 by Rayne Calle LPN  Outcome: Ongoing  8/7/2021 1519 by Mel Monte RN  Outcome: Ongoing  Goal: Able to verbalize reality based thinking  Description: Able to verbalize reality based thinking  8/7/2021 2149 by Rayne Calle LPN  Outcome: Ongoing  8/7/2021 1519 by Mel Monte RN  Outcome: Ongoing  Goal: Skin appearance normal  Description: Skin appearance normal  8/7/2021 2149 by Rayne Calle LPN  Outcome: Ongoing  8/7/2021 1519 by Mel Monte RN  Outcome: Ongoing  Goal: Maintenance of adequate nutrition will improve  Description: Maintenance of adequate nutrition will improve  8/7/2021 2149 by Rayne Calle LPN  Outcome: Ongoing  8/7/2021 1519 by Mel Monte RN  Outcome: Ongoing  Goal: Ability to tolerate increased activity will improve  Description: Ability to tolerate increased activity will improve  8/7/2021 2149 by Chelo Belcher Guillermo Ledesma, LPN  Outcome: Ongoing  8/7/2021 1519 by Camie Cooks, RN  Outcome: Ongoing  Goal: Participates in care planning  Description: Participates in care planning  8/7/2021 2149 by Glenmora Patch, LPN  Outcome: Ongoing  8/7/2021 1519 by Camie Cooks, RN  Outcome: Ongoing  Goal: Patient specific goal  Description: Patient specific goal  8/7/2021 2149 by Nga Patch, LPN  Outcome: Ongoing  8/7/2021 1519 by Camie Cooks, RN  Outcome: Ongoing     Problem: Falls - Risk of:  Goal: Will remain free from falls  Description: Will remain free from falls  8/7/2021 2149 by Glenmora Patch, LPN  Outcome: Ongoing  8/7/2021 1519 by Camie Cooks, RN  Outcome: Ongoing  Goal: Absence of physical injury  Description: Absence of physical injury  8/7/2021 2149 by Nga Patch, LPN  Outcome: Ongoing  8/7/2021 1519 by Camie Cooks, RN  Outcome: Ongoing     Problem: Confusion - Acute:  Goal: Absence of continued neurological deterioration signs and symptoms  Description: Absence of continued neurological deterioration signs and symptoms  8/7/2021 2149 by Nga Patch, LPN  Outcome: Ongoing  8/7/2021 1519 by Camie Cooks, RN  Outcome: Ongoing  Goal: Mental status will be restored to baseline  Description: Mental status will be restored to baseline  8/7/2021 2149 by Nga Patch, LPN  Outcome: Ongoing  8/7/2021 1519 by Camie Cooks, RN  Outcome: Ongoing     Problem: Discharge Planning:  Goal: Ability to perform activities of daily living will improve  Description: Ability to perform activities of daily living will improve  8/7/2021 2149 by Glenmora Patch, LPN  Outcome: Ongoing  8/7/2021 1519 by Camie Cooks, RN  Outcome: Ongoing  Goal: Participates in care planning  Description: Participates in care planning  8/7/2021 2149 by Nga Patch, LPN  Outcome: Ongoing  8/7/2021 1519 by Camie Cooks, RN  Outcome: Ongoing     Problem: Injury - Risk of, Physical Injury:  Goal: Will remain free from falls  Description: Will remain free from falls  8/7/2021 2149 by Burgess Ivis LPN  Outcome: Ongoing  8/7/2021 1519 by Maite Joyce RN  Outcome: Ongoing  Goal: Absence of physical injury  Description: Absence of physical injury  8/7/2021 2149 by Burgess Ivis LPN  Outcome: Ongoing  8/7/2021 1519 by Maite Joyce RN  Outcome: Ongoing     Problem: Mood - Altered:  Goal: Mood stable  Description: Mood stable  8/7/2021 2149 by Burgess Ivis LPN  Outcome: Ongoing  8/7/2021 1519 by Maite Joyce RN  Outcome: Ongoing  Goal: Absence of abusive behavior  Description: Absence of abusive behavior  8/7/2021 2149 by Burgess Ivis LPN  Outcome: Ongoing  8/7/2021 1519 by Maite Joyce RN  Outcome: Ongoing  Goal: Verbalizations of feeling emotionally comfortable while being cared for will increase  Description: Verbalizations of feeling emotionally comfortable while being cared for will increase  8/7/2021 2149 by Burgess Ivis LPN  Outcome: Ongoing  8/7/2021 1519 by Maite Joyce RN  Outcome: Ongoing     Problem: Psychomotor Activity - Altered:  Goal: Absence of psychomotor disturbance signs and symptoms  Description: Absence of psychomotor disturbance signs and symptoms  8/7/2021 2149 by Burgess Ivis LPN  Outcome: Ongoing  8/7/2021 1519 by Maite Joyce RN  Outcome: Ongoing     Problem: Sensory Perception - Impaired:  Goal: Demonstrations of improved sensory functioning will increase  Description: Demonstrations of improved sensory functioning will increase  8/7/2021 2149 by Burgess Ivis LPN  Outcome: Ongoing  8/7/2021 1519 by Maite Joyce RN  Outcome: Ongoing  Goal: Decrease in sensory misperception frequency  Description: Decrease in sensory misperception frequency  8/7/2021 2149 by Burgess Ivis LPN  Outcome: Ongoing  8/7/2021 1519 by Maite Joyce RN  Outcome: Ongoing  Goal: Able to refrain from responding to false sensory perceptions  Description: Able to refrain from responding to false sensory perceptions  8/7/2021 2149 by Kyra Carballo LPN  Outcome: Ongoing  8/7/2021 1519 by Evelyn Joseph RN  Outcome: Ongoing  Goal: Demonstrates accurate environmental perceptions  Description: Demonstrates accurate environmental perceptions  8/7/2021 2149 by Kyra Carballo LPN  Outcome: Ongoing  8/7/2021 1519 by Evelyn Joseph RN  Outcome: Ongoing  Goal: Able to distinguish between reality-based and nonreality-based thinking  Description: Able to distinguish between reality-based and nonreality-based thinking  8/7/2021 2149 by Kyra Carballo LPN  Outcome: Ongoing  8/7/2021 1519 by Evelyn Joseph RN  Outcome: Ongoing  Goal: Able to interrupt nonreality-based thinking  Description: Able to interrupt nonreality-based thinking  8/7/2021 2149 by Kyra Carblalo LPN  Outcome: Ongoing  8/7/2021 1519 by Evelyn Joseph RN  Outcome: Ongoing     Problem: Sleep Pattern Disturbance:  Goal: Appears well-rested  Description: Appears well-rested  8/7/2021 2149 by Kyra Carballo LPN  Outcome: Ongoing  8/7/2021 1519 by Evelyn Joseph RN  Outcome: Ongoing

## 2021-08-08 NOTE — GROUP NOTE
Group Therapy Note    Date: 8/8/2021    Group Start Time: 1500  Group End Time: 2827  Group Topic: Psychoeducation    [address]    PRETTY Hopkins      Group Therapy Note    Attendees: 5/6    Notes: The Music Therapist asked each pt a question with a theme of positivity. The PRETTY then asked each pt to share how they day has been and why it was that way. Pt was in her room during group and did not participate.     Discipline Responsible: Music Therapist-Board Certified    Electronically signed by PRETTY Hopkins on 8/8/2021 at 4:09 PM

## 2021-08-08 NOTE — BH NOTE
Patient has been up all night with the exception of one hour she has slept. Took meds without any issues.

## 2021-08-08 NOTE — GROUP NOTE
Group Therapy Note    Date: 8/8/2021    Group Start Time: 1400  Group End Time: 7048    Number of Participants: 5/6    Group Topic/Objective: Music & Art    Notes:  PRETTY provided music and art intervention. Each pt was encouraged to share their artwork and discuss what they yarelis/colored/wrote and why. Pt fell asleep at the group table during group and did not participate. Music Genre(s) Used: Hymns/Gospel/SpiritualsMONIK, [id number] OLDIES and Easy Listening    Status After Intervention:  Unchanged    Participation Level: None    Participation Quality: None    Speech:  Pt fell asleep    Thought Process/Content: Unable to report d/t pt falling asleep    Affective Functioning: Flat, pt was asleep    Mood: Pt was asleep    Level of consciousness: pt was asleep.     Response to Learning: No response; pt was asleep    Endings: None Reported    Modes of Intervention: Support; Music & Art    Discipline Responsible: Music Therapist-Board Certified    Electronically signed by PRETTY Moon on 8/8/2021 at 3:38 PM

## 2021-08-08 NOTE — GROUP NOTE
Group Therapy Note    Date: 8/8/2021    Group Start Time: 0827  Group End Time: 0900    Number of Participants: 6/6    Type: Morning Goals Group/ Community Meeting    Group Topic/Objective: Set Goal For The Day and to review Unit Rules and Regulations. Patient's Goal:  \"Nothing\"    Notes:  Pt was pleasant. Pt responded to each question asked with vocal responses or shrugs. Pt vocalized \"I got eight and a half hours\" when asked how she slept. Pt vocalizaed \"yeah\" when asked if she was feeling any depression. Pt rated her depression as a 7 on a scale of 0-10 with 10 being the worst and 0 being not at all. Pt vocalized \"yeah\" when asked if she was feeling anxiety. Pt was asked to rate on the same scale and pt vocalize \"I don't know. \"    Depression (0-10): 7    Anxiety (0-10): \"Yeah, I don't know\"    Irritability/Aggitation (0-10): Denies    Status After Intervention:  Improved    Participation Level:  Active Listener    Participation Quality: Appropriate    Speech:  normal    Thought Process/Content: Logical    Affective Functioning: Flat    Mood: Flat    Level of consciousness:  Alert    Response to Learning: Able to verbalize current knowledge/experience and Able to verbalize/acknowledge new learning    Endings: None Reported    Modes of Intervention: Education, Support and Socialization    Discipline Responsible: Music Therapist-Board Certified    Electronically signed by PRETTY Alvarez on 8/8/2021 at 9:04 AM

## 2021-08-08 NOTE — PROGRESS NOTES
Up on unit all morning, despite not sleeping last night. Lingering around nurses station to \"visit\". She speaks in short phrases. Some speech in comprehensible. Tongue thrusting continues unchanged for last few days. The tongue thrusting is less forceful than it was on admission. Crying randomly. Nods \"yes\" when asked if she misses her sister. Brighter affect. Seems more comfortable on unit and with staff.

## 2021-08-09 PROCEDURE — 6370000000 HC RX 637 (ALT 250 FOR IP): Performed by: PHYSICIAN ASSISTANT

## 2021-08-09 PROCEDURE — 1240000000 HC EMOTIONAL WELLNESS R&B

## 2021-08-09 PROCEDURE — 97530 THERAPEUTIC ACTIVITIES: CPT

## 2021-08-09 PROCEDURE — 99231 SBSQ HOSP IP/OBS SF/LOW 25: CPT | Performed by: NURSE PRACTITIONER

## 2021-08-09 PROCEDURE — 97116 GAIT TRAINING THERAPY: CPT

## 2021-08-09 PROCEDURE — 6370000000 HC RX 637 (ALT 250 FOR IP): Performed by: PSYCHIATRY & NEUROLOGY

## 2021-08-09 RX ADMIN — DIVALPROEX SODIUM 250 MG: 125 CAPSULE ORAL at 08:30

## 2021-08-09 RX ADMIN — OLANZAPINE 15 MG: 10 TABLET ORAL at 20:44

## 2021-08-09 RX ADMIN — NYSTATIN 500000 UNITS: 100000 SUSPENSION ORAL at 20:45

## 2021-08-09 RX ADMIN — AMLODIPINE BESYLATE 5 MG: 5 TABLET ORAL at 08:30

## 2021-08-09 RX ADMIN — LOSARTAN POTASSIUM 100 MG: 100 TABLET, FILM COATED ORAL at 08:31

## 2021-08-09 RX ADMIN — BENZTROPINE MESYLATE 1 MG: 1 TABLET ORAL at 08:30

## 2021-08-09 RX ADMIN — DIVALPROEX SODIUM 500 MG: 125 CAPSULE ORAL at 20:44

## 2021-08-09 RX ADMIN — NYSTATIN 500000 UNITS: 100000 SUSPENSION ORAL at 08:30

## 2021-08-09 RX ADMIN — POTASSIUM CHLORIDE 10 MEQ: 10 TABLET, EXTENDED RELEASE ORAL at 08:31

## 2021-08-09 RX ADMIN — ATORVASTATIN CALCIUM 20 MG: 20 TABLET, FILM COATED ORAL at 08:31

## 2021-08-09 RX ADMIN — OXYBUTYNIN CHLORIDE 5 MG: 5 TABLET, EXTENDED RELEASE ORAL at 08:30

## 2021-08-09 RX ADMIN — BENZTROPINE MESYLATE 1 MG: 1 TABLET ORAL at 20:45

## 2021-08-09 RX ADMIN — LORAZEPAM 0.5 MG: 0.5 TABLET ORAL at 20:44

## 2021-08-09 RX ADMIN — LEVOTHYROXINE SODIUM 25 MCG: 25 TABLET ORAL at 05:51

## 2021-08-09 RX ADMIN — CHOLECALCIFEROL TAB 125 MCG (5000 UNIT) 5000 UNITS: 125 TAB at 08:31

## 2021-08-09 ASSESSMENT — PAIN SCALES - GENERAL: PAINLEVEL_OUTOF10: 0

## 2021-08-09 NOTE — GROUP NOTE
Group Therapy Note    Date: 8/9/2021    Group Start Time: 1030  Group End Time: 5713    Number of Participants: 5/6    Type: Exercise/Recreation Group    Group Topic/Objective: Chair Exercises    Notes:  Pt was not present for group despite encouragement from PRETTY    Discipline Responsible: Music Therapist-Board Certified    Electronically signed by PRETTY Reynoso on 8/9/2021 at 12:52 PM

## 2021-08-09 NOTE — PROGRESS NOTES
Occupational Therapy    Occupational Therapy Treatment Note  Name: Snehal Morrell MRN: [id number] :   1950   Date:  2021   Admission Date: 2021 Room:  9253/0795-86   Restrictions/Precautions:  Restrictions/Precautions  Restrictions/Precautions: Fall Risk, General Precautions     Communication with other providers:   Cleared for treatment by RN. Subjective:  Patient states:  \"I need to find my shoes\"  Pain:   Location, Type, Intensity (0/10 to 10/10): No pain    Objective:    Observation:  Pt alert and oriented. Treatment, including education:  Transfers    Sit to supine :SUp  Scooting :SUp  Rolling :SUp  Sit to stand :CGA  Stand to sit :CGA  SPT:CGA          Therapeutic Activity Training: Pt completed functional mobility with RW x 75' with min standing rest break. Safety Measures: Gait belt used, Left in bed, Pull/Bed Alarm activated and call light left in reach        Assessment / Impression:        Patient's tolerance of treatment: Good   Adverse Reaction: None  Significant change in status and impact:  None  Barriers to improvement:  Dec strength and endurance    Plan for Next Session:    Continue with POC.     Time in:  1345  Time out:  1355  Total treatment time:  10  Billed Units: 1 TA  Electronically signed by:    Susan Fernandez, [address]    2021, 2:02 PM    Previously filed values:     Short term goals  Time Frame for Short term goals: Until DC or goals met  Short term goal 1: Pt will demo safe trfs/functional mobility mod I  Short term goal 2: Pt will complete UE bathing/dressing indep with min prompts to initiate  Short term goal 3: Pt will complete LE bathing/dressing indep with min prompts to initiate  Short term goal 4: Pt will demo 5+ min of fuctional mobility for ADLs/therax w/o LOB  Short term goal 5: Pt will complete toileting mod I

## 2021-08-09 NOTE — FLOWSHEET NOTE
Physical Therapy Treatment Note   Date: 2021 Room: [unfilled]   Name: Chani Cleary : 1950   MRN: [id number] Admission Date:2021    Primary Problem:   Past Medical History:   Diagnosis Date    Essential hypertension     Hyperlipidemia      History reviewed. No pertinent surgical history.   Rehabilitation Diagnosis: impaired mobility  Restrictions/Precautions:     Subjective:   Initial Pain level: 0/10    Goals:                   Short term goals  Time Frame for Short term goals: 1 week  Short term goal 1: patient will safely ambulate 150 ft using walkier with CGA  Short term goal 2: patient will stand safely x5' with UE support as needed while perfoming reaching and other staic balance actvities               Plan of Care:             Times per week: 1+                   Objective Findings: Patient requires use of walker for safety- without walker patient will reach out for support and will lose balance posteriorly   Date:21 Date:  2021 Date:  Date:  Date:    Bed mobility Independent Independent      Sit to stand transfer Mod Independent Mod I      Stand to sit transfer Mod independent Mod I      Commode transfer Not performed Not performed      Standing tolerance fair fair      Ambulation Ambulated 50 ft with RW and CGA-  Ambulated 75ft with RW and CGA      Stairs Not performed Not performed        Exercises:   Exercise/Equipment/Modalities  Date:  Date:  Date:  Date:                                                                               Modality/intervention used:   [ ] Therapeutic Exercise   [ ] Modalities:   [ ] Therapeutic Activity     [ ] Ultrasound   [ ] Elec Stim   [x ] Gait Training     [ ] Cervical Traction  [ ] Lumbar Traction   [ ] Neuromuscular Re-education   [ ] Cold/hotpack  [ ] Iontophoresis   [ ] Instruction in HEP     [ ] Rajendra Starr   [ ] Manual Therapy   [ ] Aquatic Therapy     Other Therapeutic Activities:    Home Exercise Program/Education provided to patient: Manual Treatments:     Communication with other providers:      Adverse reactions to treatment:     Treatment/Activity Tolerance:   [ ] Patient limited by fatigue [ ] Patient limited by pain [ ] Patient limited by other medical complications [ x] Patient tolerated therapy well  [ ] Other:     Post Tx Pain Ratin/10     Safety Precautions:   [ ] left in bed  [x ] left in chair [ ] call light within reach  [ ] bed alarm on  [ ] personal alarm on  [ ] other staff present:    Plan:   [x ] Continue per plan of care [ ] Riki Guillen current plan   [ ] Plan of care initiated [ ] Hold pending MD visit [ ] Discharge     Plan for Next Session:     Time In: 1345  Time Out: 1355  Total Treatment Minutes: 10'  Billed Units: 1    Signed: Juan Pablo Ignacio PTA  2021, 2:02 PM

## 2021-08-09 NOTE — PROGRESS NOTES
Psychiatric Progress Note    Lev Feliciano  [id number]  08/09/21    CHIEF COMPLAINT: I get upset    HPI: Lev Feliciano is a 81 yo  female who presents for exacerbation of Neurocognitive D/O alzehimers type with behavioral disturbance. Pt noted recent exacarbation of mood and confusion with agitationf. Pt noted she currently feels safe and comfortable on the unit. Pt was in agreement with treatment team.  Pt was polite and cordial during the interview process. 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 \"  Pt noted she is sleeping \"10 hours last night. \" Per staff this was aided with prn haldol and scheduled  ativan. Patient was kept up and busy all day. Pt noted her apptetite is reduced. Pt rated her depresssion a \"7,\" on a scale of zero to ten with ten being the worst and zero being none. Pt rated her anxiety a \"7,\" on the same scale. Patient sobbing and extremely upset today. She wants to be left alone. She continues to be redirectable. Pt denied any thoughts to harm herself or anyone else.   Pt continues to note auditory or visiual hallucintations.           Allergies   Allergen Reactions    Penicillins Hives    Sulfa Antibiotics Other (See Comments)     Reaction unknown       Medications Prior to Admission: amLODIPine (NORVASC) 5 MG tablet, Take 5 mg by mouth daily  vitamin D3 (CHOLECALCIFEROL) 125 MCG (5000 UT) TABS tablet, Take 50,000 Units by mouth daily  fenofibrate (TRIGLIDE) 160 MG tablet, Take 160 mg by mouth daily  levothyroxine (SYNTHROID) 25 MCG tablet, Take 25 mcg by mouth daily  losartan (COZAAR) 100 MG tablet, Take 100 mg by mouth daily  oxybutynin (DITROPAN) 5 MG tablet, Take 5 mg by mouth daily  potassium chloride (KLOR-CON) 10 MEQ extended release tablet, Take 10 mEq by mouth daily  simvastatin (ZOCOR) 40 MG tablet, Take 40 mg by mouth daily  venlafaxine (EFFEXOR XR) 75 MG extended release capsule, Take 75 mg by mouth daily    Past Medical History:   Diagnosis Date    Essential hypertension     Hyperlipidemia         Patient Active Problem List   Diagnosis    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 (HCC)    Hypothyroidism due to acquired atrophy of thyroid    Essential hypertension    Overactive bladder    Hypokalemia    Other hyperlipidemia       Review of Systems    OBJECTIVE  Vital Signs:  Vitals:    08/09/21 0810   BP: (!) 83/49   Pulse: 66   Resp: 18   Temp: 97.4 °F (36.3 °C)   SpO2: 96%       Labs:  Recent Results (from the past 48 hour(s))   Ammonia    Collection Time: 08/08/21  6:00 AM   Result Value Ref Range    Ammonia 28 11 - 51 UMOL/L   Comprehensive Metabolic Panel w/ Reflex to MG    Collection Time: 08/08/21  6:00 AM   Result Value Ref Range    Sodium 139 135 - 145 MMOL/L    Potassium 3.7 3.5 - 5.1 MMOL/L    Chloride 102 99 - 110 mMol/L    CO2 27 21 - 32 MMOL/L    BUN 22 6 - 23 MG/DL    CREATININE 0.9 0.6 - 1.1 MG/DL    Glucose 90 70 - 99 MG/DL    Calcium 10.1 8.3 - 10.6 MG/DL    Albumin 3.9 3.4 - 5.0 GM/DL    Total Protein 7.1 6.4 - 8.2 GM/DL    Total Bilirubin 0.4 0.0 - 1.0 MG/DL    ALT 10 10 - 40 U/L    AST 18 15 - 37 IU/L    Alkaline Phosphatase 66 40 - 129 IU/L    GFR Non-African American >60 >60 mL/min/1.73m2    GFR African American >60 >60 mL/min/1.73m2    Anion Gap 10 4 - 16   Valproic acid level, total    Collection Time: 08/08/21  6:00 AM   Result Value Ref Range    Valproic Acid Lvl 64.6 50 - 100 UG/ML    DOSE AMOUNT DOSE AMT. GIVEN - UNKNOWN     DOSE TIME DOSE TIME GIVEN - UNKNOWN        PSYCHIATRIC ASSESSMENT / MENTAL STATUS EXAM:   Vitals: Blood pressure (!) 83/49, pulse 66, temperature 97.4 °F (36.3 °C), temperature source Temporal, resp. rate 18, height 5' 2\" (1.575 m), weight 142 lb (64.4 kg), SpO2 96 %. CONSTITUTIONAL:    Appearance: appears stated age. alert and oriented to person, place.  no acute therapy group or individual.  Pt was in agreement with treatment plan. The risks benefits and side effects of medications were discussed with the patient, including alternatives and no treatment.     Electronically signed by GURDEEP Pollock CNP on 8/9/2021 at 11:46 AM

## 2021-08-09 NOTE — CARE COORDINATION
Spoke with sister, Melisa Sid and with admissions rep @ [facility] to update both on how patient is doing and to confirm that the discharge plan remains for patient to discharge to [facility] once the physician feels she is ready. Sister confirmed patient uses [facility] in [facility] for prescriptions.

## 2021-08-09 NOTE — GROUP NOTE
Group Therapy Note    Date: 8/9/2021    Group Start Time: 0808  Group End Time: 0840    Number of Participants: 6/6    Type: Morning Goals Group/ Community Meeting    Group Topic/Objective: Set Goal For The Day and to review Unit Rules and Regulations. Patient's Goal: Pt shrugged her shoulders and would not vocally respond. Notes:  Pt was pleasant to speak with. Pt reported feeling \"okay\" and sleeping \"better than I did the other night. \" Pt's speech was unintelligible in response to remainder of questions. Depression (0-10): Pt shook her head no    Anxiety (0-10): Pt shook her head no    Irritability/Aggitation (0-10): Pt shook her head no    Status After Intervention:  Improved    Participation Level:  Active Listener and Minimal    Participation Quality: Appropriate     Speech:  Normal at first, then unintelligible     Thought Process/Content: Logical    Affective Functioning: Flat    Mood: Flat    Level of consciousness:  Alert    Response to Learning: Able to verbalize current knowledge/experience    Endings: None Reported    Modes of Intervention: Education, Support and Socialization    Discipline Responsible: Music Therapist-Board Certified    Electronically signed by PRETTY Hoffman on 8/9/2021 at 10:04 AM

## 2021-08-10 PROCEDURE — 99232 SBSQ HOSP IP/OBS MODERATE 35: CPT | Performed by: PSYCHIATRY & NEUROLOGY

## 2021-08-10 PROCEDURE — 6370000000 HC RX 637 (ALT 250 FOR IP): Performed by: PSYCHIATRY & NEUROLOGY

## 2021-08-10 PROCEDURE — 1240000000 HC EMOTIONAL WELLNESS R&B

## 2021-08-10 PROCEDURE — 6370000000 HC RX 637 (ALT 250 FOR IP): Performed by: PHYSICIAN ASSISTANT

## 2021-08-10 RX ADMIN — BENZTROPINE MESYLATE 1 MG: 1 TABLET ORAL at 07:39

## 2021-08-10 RX ADMIN — LORAZEPAM 0.5 MG: 0.5 TABLET ORAL at 20:57

## 2021-08-10 RX ADMIN — NYSTATIN 500000 UNITS: 100000 SUSPENSION ORAL at 21:08

## 2021-08-10 RX ADMIN — ATORVASTATIN CALCIUM 20 MG: 20 TABLET, FILM COATED ORAL at 07:39

## 2021-08-10 RX ADMIN — POTASSIUM CHLORIDE 10 MEQ: 10 TABLET, EXTENDED RELEASE ORAL at 07:38

## 2021-08-10 RX ADMIN — BENZTROPINE MESYLATE 1 MG: 1 TABLET ORAL at 20:57

## 2021-08-10 RX ADMIN — OXYBUTYNIN CHLORIDE 5 MG: 5 TABLET, EXTENDED RELEASE ORAL at 07:38

## 2021-08-10 RX ADMIN — DIVALPROEX SODIUM 500 MG: 125 CAPSULE ORAL at 20:56

## 2021-08-10 RX ADMIN — CHOLECALCIFEROL TAB 125 MCG (5000 UNIT) 5000 UNITS: 125 TAB at 07:38

## 2021-08-10 RX ADMIN — LEVOTHYROXINE SODIUM 25 MCG: 25 TABLET ORAL at 07:38

## 2021-08-10 RX ADMIN — NYSTATIN 500000 UNITS: 100000 SUSPENSION ORAL at 14:18

## 2021-08-10 RX ADMIN — AMLODIPINE BESYLATE 5 MG: 5 TABLET ORAL at 07:38

## 2021-08-10 RX ADMIN — NYSTATIN 500000 UNITS: 100000 SUSPENSION ORAL at 18:46

## 2021-08-10 RX ADMIN — DIVALPROEX SODIUM 250 MG: 125 CAPSULE ORAL at 07:38

## 2021-08-10 RX ADMIN — NYSTATIN 500000 UNITS: 100000 SUSPENSION ORAL at 07:38

## 2021-08-10 RX ADMIN — LOSARTAN POTASSIUM 100 MG: 100 TABLET, FILM COATED ORAL at 07:38

## 2021-08-10 RX ADMIN — OLANZAPINE 15 MG: 10 TABLET ORAL at 20:57

## 2021-08-10 ASSESSMENT — PAIN SCALES - GENERAL: PAINLEVEL_OUTOF10: 0

## 2021-08-10 NOTE — GROUP NOTE
Group Therapy Note    Date: 8/10/2021    Group Start Time: 1530  Group End Time: 1600    Number of Participants: 2/3    Type: Exercise/Recreation Group    Group Topic/Objective: Chair Exercise    Notes:  Pt participated actively in the exercises and completed all exercises. Status After Intervention:  Improved    Participation Level:  Active Listener and Interactive    Participation Quality: Appropriate, Attentive and Sharing    Speech:  normal    Thought Process/Content: Logical    Affective Functioning: Blunted    Mood: Blunted    Level of consciousness:  Alert and Attentive    Response to Learning: Able to verbalize current knowledge/experience and Able to verbalize/acknowledge new learning    Endings: None Reported    Modes of Intervention: Activity and Movement    Discipline Responsible: Certified Therapeutic Recreation Specialist     Electronically signed by Marily Velasco [address] [place] [address], [place] on 8/11/2021 at 9:34 AM

## 2021-08-10 NOTE — GROUP NOTE
Group Therapy Note    Date: 8/10/2021    Group Start Time: 0830  Group End Time: 0900    Number of Participants: 4/4    Type: Morning Goals Group/ Community Meeting    Group Topic/Objective: Set Goal For The Day and to review Unit Rules and Regulations. Patient's Goal:  Pt refused to state a goal.     Notes:  Pt minimally engaged in group. Pt avoided eye contact with therapist and peers. Pt states she is feeling \"Ok\" and had restful sleep. Pt would not answer remaining questions. Depression (0-10): Pt would not answer. Anxiety (0-10): Pt would not answer. Irritability/Aggitation (0-10): Pt would not answer.      Status After Intervention:  Unchanged    Participation Level: Minimal    Participation Quality: Resistant    Speech:  pressured    Thought Process/Content: Logical    Affective Functioning: Blunted    Mood: Blunted    Level of consciousness:  Inattentive    Response to Learning: Resistant    Endings: None Reported    Modes of Intervention: Education, Support and Socialization    Discipline Responsible: Certified Therapeutic Recreation Specialist     Electronically signed by SWATI Be MA on 8/10/2021 at 9:45 AM

## 2021-08-10 NOTE — GROUP NOTE
Group Therapy Note    Date: 8/10/2021    Group Start Time: 1030  Group End Time: 1100  Group Topic: Psychoeducation    [address] [facility]    Cici Jacobs, CTRS, MA         Group Therapy Note    Attendees: 3/4       Notes:  Pts participated in recreational therapy group; Pet Therapy. Volunteer brought pet therapy trained dog on the unit and allowed pts to interact with pts. Purpose was to encourage socialization, improve mood, and introduce coping skill. Pt encouraged to attend, pt refused.        Discipline Responsible: Certified Therapeutic Recreation Specialist       Signature:  Cici Jacobs, [address], [address] [facility]

## 2021-08-10 NOTE — PLAN OF CARE
Problem: Altered Mood, Depressive Behavior:  Goal: Absence of self-harm  Description: Absence of self-harm  Outcome: Met This Shift     Problem: Falls - Risk of:  Goal: Absence of physical injury  Description: Absence of physical injury  Outcome: Met This Shift     Problem: Injury - Risk of, Physical Injury:  Goal: Absence of physical injury  Description: Absence of physical injury  Outcome: Met This Shift     Problem: Mood - Altered:  Goal: Absence of abusive behavior  Description: Absence of abusive behavior  Outcome: Met This Shift     Problem: Skin Integrity:  Goal: Will show no infection signs and symptoms  Description: Will show no infection signs and symptoms  Outcome: Ongoing  Goal: Absence of new skin breakdown  Description: Absence of new skin breakdown  Outcome: Ongoing     Problem: Altered Mood, Depressive Behavior:  Goal: Patient specific goal  Description: Patient specific goal  Outcome: Ongoing     Problem: Altered Mood, Deterioration in Function:  Goal: Ability to perform activities of daily living will improve  Description: Ability to perform activities of daily living will improve  Outcome: Ongoing  Goal: Able to verbalize reality based thinking  Description: Able to verbalize reality based thinking  Outcome: Ongoing  Goal: Skin appearance normal  Description: Skin appearance normal  Outcome: Ongoing  Goal: Maintenance of adequate nutrition will improve  Description: Maintenance of adequate nutrition will improve  Outcome: Ongoing  Goal: Ability to tolerate increased activity will improve  Description: Ability to tolerate increased activity will improve  Outcome: Ongoing  Goal: Participates in care planning  Description: Participates in care planning  Outcome: Ongoing  Goal: Patient specific goal  Description: Patient specific goal  Outcome: Ongoing     Problem: Falls - Risk of:  Goal: Will remain free from falls  Description: Will remain free from falls  Outcome: Ongoing     Problem: Confusion - Acute:  Goal: Absence of continued neurological deterioration signs and symptoms  Description: Absence of continued neurological deterioration signs and symptoms  Outcome: Ongoing  Goal: Mental status will be restored to baseline  Description: Mental status will be restored to baseline  Outcome: Ongoing     Problem: Discharge Planning:  Goal: Ability to perform activities of daily living will improve  Description: Ability to perform activities of daily living will improve  Outcome: Ongoing  Goal: Participates in care planning  Description: Participates in care planning  Outcome: Ongoing     Problem: Injury - Risk of, Physical Injury:  Goal: Will remain free from falls  Description: Will remain free from falls  Outcome: Ongoing     Problem: Mood - Altered:  Goal: Mood stable  Description: Mood stable  Outcome: Ongoing  Goal: Verbalizations of feeling emotionally comfortable while being cared for will increase  Description: Verbalizations of feeling emotionally comfortable while being cared for will increase  Outcome: Ongoing     Problem: Psychomotor Activity - Altered:  Goal: Absence of psychomotor disturbance signs and symptoms  Description: Absence of psychomotor disturbance signs and symptoms  Outcome: Ongoing     Problem: Sensory Perception - Impaired:  Goal: Demonstrations of improved sensory functioning will increase  Description: Demonstrations of improved sensory functioning will increase  Outcome: Ongoing  Goal: Decrease in sensory misperception frequency  Description: Decrease in sensory misperception frequency  Outcome: Ongoing  Goal: Able to refrain from responding to false sensory perceptions  Description: Able to refrain from responding to false sensory perceptions  Outcome: Ongoing  Goal: Demonstrates accurate environmental perceptions  Description: Demonstrates accurate environmental perceptions  Outcome: Ongoing  Goal: Able to distinguish between reality-based and nonreality-based thinking  Description: Able to distinguish between reality-based and nonreality-based thinking  Outcome: Ongoing  Goal: Able to interrupt nonreality-based thinking  Description: Able to interrupt nonreality-based thinking  Outcome: Ongoing     Problem: Sleep Pattern Disturbance:  Goal: Appears well-rested  Description: Appears well-rested  Outcome: Ongoing

## 2021-08-10 NOTE — PROGRESS NOTES
Psychiatric Progress Note    Octavia Moses  [id number]  08/10/21    CHIEF COMPLAINT: I get upset    HPI: Octavia Moses is a 81 yo  female who presents for exacerbation of Neurocognitive D/O alzehimers type with behavioral disturbance. Pt noted recent exacarbation of mood and confusion with agitationf. Pt noted she currently feels safe and comfortable on the unit. Pt was in agreement with treatment team.  Pt was polite and cordial during the interview process. 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 \"  Pt noted she is sleeping \"not much last night. . last night. \"  Pt noted her apptetite is reduced. Pt rated her depresssion a \"2,\" on a scale of zero to ten with ten being the worst and zero being none. Pt rated her anxiety a \"1,\" on the same scale. Pt denied any thoughts to harm herself or anyone else.   Pt continues to note auditory or visiual hallucintations.           Allergies   Allergen Reactions    Penicillins Hives    Sulfa Antibiotics Other (See Comments)     Reaction unknown       Medications Prior to Admission: amLODIPine (NORVASC) 5 MG tablet, Take 5 mg by mouth daily  vitamin D3 (CHOLECALCIFEROL) 125 MCG (5000 UT) TABS tablet, Take 50,000 Units by mouth daily  fenofibrate (TRIGLIDE) 160 MG tablet, Take 160 mg by mouth daily  levothyroxine (SYNTHROID) 25 MCG tablet, Take 25 mcg by mouth daily  losartan (COZAAR) 100 MG tablet, Take 100 mg by mouth daily  oxybutynin (DITROPAN) 5 MG tablet, Take 5 mg by mouth daily  potassium chloride (KLOR-CON) 10 MEQ extended release tablet, Take 10 mEq by mouth daily  simvastatin (ZOCOR) 40 MG tablet, Take 40 mg by mouth daily  venlafaxine (EFFEXOR XR) 75 MG extended release capsule, Take 75 mg by mouth daily    Past Medical History:   Diagnosis Date    Essential hypertension     Hyperlipidemia         Patient Active Problem List   Diagnosis    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 ([facility] [place] [address]    Hypothyroidism due to acquired atrophy of thyroid    Essential hypertension    Overactive bladder    Hypokalemia    Other hyperlipidemia       Review of Systems    OBJECTIVE  Vital Signs:  Vitals:    08/10/21 0745   BP: 117/70   Pulse: 73   Resp: 16   Temp: 98.4 °F (36.9 °C)   SpO2: 97%       Labs:  No results found for this or any previous visit (from the past 48 hour(s)). PSYCHIATRIC ASSESSMENT / MENTAL STATUS EXAM:   Vitals: Blood pressure 117/70, pulse 73, temperature 98.4 °F (36.9 °C), temperature source Temporal, resp. rate 16, height 5' 2\" (1.575 m), weight 142 lb (64.4 kg), SpO2 97 %. CONSTITUTIONAL:    Appearance: appears stated age. alert and oriented to person, place. no acute distress. Adequate grooming and hygeine. Good eye contact. No prominent physical abnormalities. Attitude: Manner is cooperative and pleasant  Motor: No psychomotor agitation, retardation or abnormal movements noted  Speech: Clearly articulated; normal rate, volume, tone & amount. Language: intact understanding and production  Mood: depressed  Affect: euthymic, full range, non-labile, congruent with mood and content of speech  Thought Production: Spontaneous. Thought Form: linear at times yet Noted tangentiality and circumstantiality with loosening of associations. Thought Content/Perceptions: No NETTIE, noted both AVH, noted delusion  Insight: poor  Judgment: poor  Memory: Immediate, recent, and remote appear impaired, though not formally tested. Attention: maintained throughout interview  Fund of knowledge: Average  Gait/Balance: WNL/WNL         IMPRESSION:   Neurocognitive D/O Alzheimer type with behavioral disturba c  Schizophrenia   Tardive Dyskineasa   [place] movements    PLAN:  Psychiatric management:medication initiation and titration, group and individual therapy, safe and theraputic environment.   Status of problem/condition: ?Improving  Medical co-morbidities: Management per [facility] group, appreciate assistance  Legal Status:Pending  Disposition:estimated LOS: Pending. The treatment team reviewed with the patient the diagnosis and treatment recommendations to include the risks, benefits, and side effects of chosen medications. The patient verbalized understanding and agreed with the treatment regimen as outlined above. The patient was encouraged to participate in groups. Medical records, Labs, Diagnotic tests reviewed  q15 min safety checks for safety  Interval History. Review current labs  Continue current medications - start ativan 0.5 mg PO QHS for TD and insomina  Supportive Therapy Provided  Pt had an opportunity to ask questions and address concerns  Pt encouraged to continue therapy group or individual.  Pt was in agreement with treatment plan. The risks benefits and side effects of medications were discussed with the patient, including alternatives and no treatment.     Electronically signed by Lucina Dixon DO on 8/10/2021 at 10:48 AM

## 2021-08-11 LAB
SARS-COV-2, NAAT: NOT DETECTED
SOURCE: NORMAL

## 2021-08-11 PROCEDURE — 6370000000 HC RX 637 (ALT 250 FOR IP): Performed by: PSYCHIATRY & NEUROLOGY

## 2021-08-11 PROCEDURE — 6370000000 HC RX 637 (ALT 250 FOR IP): Performed by: PHYSICIAN ASSISTANT

## 2021-08-11 PROCEDURE — 1240000000 HC EMOTIONAL WELLNESS R&B

## 2021-08-11 PROCEDURE — 87635 SARS-COV-2 COVID-19 AMP PRB: CPT

## 2021-08-11 PROCEDURE — U0003 INFECTIOUS AGENT DETECTION BY NUCLEIC ACID (DNA OR RNA); SEVERE ACUTE RESPIRATORY SYNDROME CORONAVIRUS 2 (SARS-COV-2) (CORONAVIRUS DISEASE [COVID-19]), AMPLIFIED PROBE TECHNIQUE, MAKING USE OF HIGH THROUGHPUT TECHNOLOGIES AS DESCRIBED BY CMS-2020-01-R: HCPCS

## 2021-08-11 PROCEDURE — 99232 SBSQ HOSP IP/OBS MODERATE 35: CPT | Performed by: PSYCHIATRY & NEUROLOGY

## 2021-08-11 RX ORDER — DOXEPIN HYDROCHLORIDE 10 MG/1
10 CAPSULE ORAL NIGHTLY
Qty: 30 CAPSULE | Refills: 0
Start: 2021-08-11

## 2021-08-11 RX ORDER — TRAZODONE HYDROCHLORIDE 50 MG/1
50 TABLET ORAL NIGHTLY PRN
Qty: 30 TABLET | Refills: 0
Start: 2021-08-11

## 2021-08-11 RX ORDER — DIVALPROEX SODIUM 125 MG/1
500 CAPSULE, COATED PELLETS ORAL NIGHTLY
Qty: 60 CAPSULE | Refills: 0
Start: 2021-08-11

## 2021-08-11 RX ORDER — DOXEPIN HYDROCHLORIDE 10 MG/1
10 CAPSULE ORAL NIGHTLY
Status: DISCONTINUED | OUTPATIENT
Start: 2021-08-11 | End: 2021-08-12 | Stop reason: HOSPADM

## 2021-08-11 RX ORDER — LORAZEPAM 1 MG/1
1 TABLET ORAL NIGHTLY
Qty: 30 TABLET | Refills: 0 | Status: SHIPPED | OUTPATIENT
Start: 2021-08-11 | End: 2021-09-10

## 2021-08-11 RX ORDER — VALBENAZINE 80 MG/1
80 CAPSULE ORAL NIGHTLY
Qty: 30 CAPSULE | Refills: 3 | Status: SHIPPED | OUTPATIENT
Start: 2021-08-11

## 2021-08-11 RX ORDER — OLANZAPINE 10 MG/1
10 TABLET ORAL NIGHTLY
Qty: 30 TABLET | Refills: 0 | Status: SHIPPED | OUTPATIENT
Start: 2021-08-11

## 2021-08-11 RX ORDER — BENZTROPINE MESYLATE 1 MG/1
1 TABLET ORAL 2 TIMES DAILY
Qty: 60 TABLET | Refills: 3 | Status: SHIPPED | OUTPATIENT
Start: 2021-08-11

## 2021-08-11 RX ORDER — DIVALPROEX SODIUM 125 MG/1
250 CAPSULE, COATED PELLETS ORAL DAILY
Qty: 60 CAPSULE | Refills: 3
Start: 2021-08-12

## 2021-08-11 RX ORDER — OLANZAPINE 10 MG/1
10 TABLET ORAL NIGHTLY
Status: DISCONTINUED | OUTPATIENT
Start: 2021-08-11 | End: 2021-08-12 | Stop reason: HOSPADM

## 2021-08-11 RX ORDER — LORAZEPAM 1 MG/1
1 TABLET ORAL NIGHTLY
Status: DISCONTINUED | OUTPATIENT
Start: 2021-08-11 | End: 2021-08-12 | Stop reason: HOSPADM

## 2021-08-11 RX ADMIN — AMLODIPINE BESYLATE 5 MG: 5 TABLET ORAL at 08:12

## 2021-08-11 RX ADMIN — NYSTATIN 500000 UNITS: 100000 SUSPENSION ORAL at 13:28

## 2021-08-11 RX ADMIN — ATORVASTATIN CALCIUM 20 MG: 20 TABLET, FILM COATED ORAL at 08:12

## 2021-08-11 RX ADMIN — TRAZODONE HYDROCHLORIDE 50 MG: 50 TABLET ORAL at 02:46

## 2021-08-11 RX ADMIN — NYSTATIN 500000 UNITS: 100000 SUSPENSION ORAL at 20:39

## 2021-08-11 RX ADMIN — BENZTROPINE MESYLATE 1 MG: 1 TABLET ORAL at 20:40

## 2021-08-11 RX ADMIN — NYSTATIN 500000 UNITS: 100000 SUSPENSION ORAL at 17:10

## 2021-08-11 RX ADMIN — LEVOTHYROXINE SODIUM 25 MCG: 25 TABLET ORAL at 06:07

## 2021-08-11 RX ADMIN — OXYBUTYNIN CHLORIDE 5 MG: 5 TABLET, EXTENDED RELEASE ORAL at 08:12

## 2021-08-11 RX ADMIN — DIVALPROEX SODIUM 250 MG: 125 CAPSULE ORAL at 08:12

## 2021-08-11 RX ADMIN — LORAZEPAM 1 MG: 1 TABLET ORAL at 20:39

## 2021-08-11 RX ADMIN — CHOLECALCIFEROL TAB 125 MCG (5000 UNIT) 5000 UNITS: 125 TAB at 08:12

## 2021-08-11 RX ADMIN — TRAZODONE HYDROCHLORIDE 50 MG: 50 TABLET ORAL at 20:40

## 2021-08-11 RX ADMIN — DOXEPIN HYDROCHLORIDE 10 MG: 10 CAPSULE ORAL at 20:40

## 2021-08-11 RX ADMIN — BENZTROPINE MESYLATE 1 MG: 1 TABLET ORAL at 08:12

## 2021-08-11 RX ADMIN — POTASSIUM CHLORIDE 10 MEQ: 10 TABLET, EXTENDED RELEASE ORAL at 08:12

## 2021-08-11 RX ADMIN — LOSARTAN POTASSIUM 100 MG: 100 TABLET, FILM COATED ORAL at 08:12

## 2021-08-11 RX ADMIN — NYSTATIN 500000 UNITS: 100000 SUSPENSION ORAL at 08:12

## 2021-08-11 RX ADMIN — OLANZAPINE 10 MG: 10 TABLET ORAL at 20:39

## 2021-08-11 RX ADMIN — DIVALPROEX SODIUM 500 MG: 125 CAPSULE ORAL at 20:39

## 2021-08-11 ASSESSMENT — PAIN SCALES - GENERAL
PAINLEVEL_OUTOF10: 0

## 2021-08-11 NOTE — PROGRESS NOTES
Attempted 1:1 session at ~1115. Pt refused to work with therapist at this time. Therapist will attempt again at a later time.      Electronically signed by SWATI Loo MA on 8/11/2021 at 11:35 AM

## 2021-08-11 NOTE — PROGRESS NOTES
Psychiatric Progress Note    Remy Barton  [id number]  08/11/21    CHIEF COMPLAINT: I get upset    HPI: Remy Barton is a 81 yo  female who presents for exacerbation of Neurocognitive D/O alzehimers type with behavioral disturbance. Pt noted recent exacarbation of mood and confusion with agitationf. Pt noted she currently feels safe and comfortable on the unit. Pt was in agreement with treatment team.  Pt was polite and cordial during the interview process. 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 \"  Pt noted she is sleeping \"not much last night. . last night. \"  Pt noted her apptetite is reduced. Pt rated her depresssion a \"1,\" on a scale of zero to ten with ten being the worst and zero being none. Pt rated her anxiety a \"1,\" on the same scale. Pt denied any thoughts to harm herself or anyone else.   Pt continues to note chronic auditory or visiual hallucintations.           Allergies   Allergen Reactions    Penicillins Hives    Sulfa Antibiotics Other (See Comments)     Reaction unknown       Medications Prior to Admission: amLODIPine (NORVASC) 5 MG tablet, Take 5 mg by mouth daily  vitamin D3 (CHOLECALCIFEROL) 125 MCG (5000 UT) TABS tablet, Take 50,000 Units by mouth daily  fenofibrate (TRIGLIDE) 160 MG tablet, Take 160 mg by mouth daily  levothyroxine (SYNTHROID) 25 MCG tablet, Take 25 mcg by mouth daily  losartan (COZAAR) 100 MG tablet, Take 100 mg by mouth daily  oxybutynin (DITROPAN) 5 MG tablet, Take 5 mg by mouth daily  potassium chloride (KLOR-CON) 10 MEQ extended release tablet, Take 10 mEq by mouth daily  simvastatin (ZOCOR) 40 MG tablet, Take 40 mg by mouth daily  venlafaxine (EFFEXOR XR) 75 MG extended release capsule, Take 75 mg by mouth daily    Past Medical History:   Diagnosis Date    Essential hypertension     Hyperlipidemia         Patient Active Problem List   Diagnosis    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 ([facility] [address]    Hypothyroidism due to acquired atrophy of thyroid    Essential hypertension    Overactive bladder    Hypokalemia    Other hyperlipidemia       Review of Systems    OBJECTIVE  Vital Signs:  Vitals:    08/11/21 0815   BP:    Pulse: 67   Resp: 12   Temp: 97.5 °F (36.4 °C)   SpO2: 96%       Labs:  No results found for this or any previous visit (from the past 48 hour(s)). PSYCHIATRIC ASSESSMENT / MENTAL STATUS EXAM:   Vitals: Blood pressure (!) 141/71, pulse 67, temperature 97.5 °F (36.4 °C), temperature source Temporal, resp. rate 12, height 5' 2\" (1.575 m), weight 142 lb (64.4 kg), SpO2 96 %. CONSTITUTIONAL:    Appearance: appears stated age. alert and oriented to person, place. no acute distress. Adequate grooming and hygeine. Good eye contact. No prominent physical abnormalities. Attitude: Manner is cooperative and pleasant  Motor: No psychomotor agitation, retardation or abnormal movements noted  Speech: Clearly articulated; normal rate, volume, tone & amount. Language: intact understanding and production  Mood: depressed  Affect: euthymic, full range, non-labile, congruent with mood and content of speech  Thought Production: Spontaneous. Thought Form: linear at times yet Noted tangentiality and circumstantiality with loosening of associations. Thought Content/Perceptions: No NETTIE, noted both AVH, noted delusion  Insight: poor  Judgment: poor  Memory: Immediate, recent, and remote appear impaired, though not formally tested. Attention: maintained throughout interview  Fund of knowledge: Average  Gait/Balance: WNL/WNL         IMPRESSION:   Neurocognitive D/O Alzheimer type with behavioral disturba c  Schizophrenia   Tardive Dyskineasa   [place] movements    PLAN:  Psychiatric management:medication initiation and titration, group and individual therapy, safe and theraputic environment.   Status of problem/condition: ?Improving  Medical co-morbidities: Management per [facility] group, appreciate assistance  Legal Status:Pending  Disposition:estimated LOS: Pending. The treatment team reviewed with the patient the diagnosis and treatment recommendations to include the risks, benefits, and side effects of chosen medications. The patient verbalized understanding and agreed with the treatment regimen as outlined above. The patient was encouraged to participate in groups. Medical records, Labs, Diagnotic tests reviewed  q15 min safety checks for safety  Interval History. Review current labs  Continue current medications - reduce olanzapine to 10 mg PO QHS, start doxepine 10 mg PO QHS for mood  Supportive Therapy Provided  Pt had an opportunity to ask questions and address concerns  Pt encouraged to continue therapy group or individual.  Pt was in agreement with treatment plan. The risks benefits and side effects of medications were discussed with the patient, including alternatives and no treatment.     Electronically signed by Ondina Webster DO on 8/11/2021 at 11:11 AM

## 2021-08-11 NOTE — DISCHARGE SUMMARY
Department of Psychiatry    Discharge Summary    Sunshine Morrison  [id number]    Admission date:   7/23/2021    Discharge:   Date: 8/12/2021  [facility]    Inpatient Provider: Dannie Fuentes D.O. Unit: [facility]    Diagnosis on Discharge: Active Hospital Problems    Diagnosis Date Noted    Hypokalemia [E87.6] 07/23/2021     Priority: Medium    Hypothyroidism due to acquired atrophy of thyroid [E03.4] 07/23/2021     Priority: Low    Essential hypertension [I10] 07/23/2021     Priority: Low    Other hyperlipidemia [E78.49] 07/23/2021     Priority: Low    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 ([facility] [address] [G30.9, F02.81] 07/23/2021    Overactive bladder [N32.81] 07/23/2021       Reason for Admission:   Pt is a 81 yo  female who presents for exacerbation of Neurocognitive D/O alzehimers type with behavioral disturbance. Pt noted recent exacarbation of mood and confusion with agitationf. Pt noted she currently feels safe and comfortable on the unit. Pt was in agreement with treatment team.  Pt was polite and cordial during the interview process. Hospital Course:   Patient was seen and evaluated by a multidisciplinary treatment team, in this evaluation patient was able to contribute freely. Patient was able to provide informed consent and outline the risks and benefits of medications. Sunshine Morrison was compliant with medications. 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 Patient was able to provide informed consent and outline the risks and benefits of medications. Siri Myers was compliant with medications.     Pt noted a significant reduction in her depression and anxiety during her  stay on [facility]. Pt noted that she slowly improved to the point that she   was comfortable and safe to D/c to an assisted facility  Pt noted he felt her medications were  working well and denied any current side effects. Treatment team encouraged   to stay out of bed and try to find activities to do, verbalized  understanding. Patient reported that she felt safe on the unit and comfortable  for discharge. Pt Denied suicidal/homicidal ideations, denied any problems  or concerns with medications or side effects. patient voiced progression towards  treatment goals and was offered a copy of updated treatment plan completed  during visit today. Denied any immediate needs or concerns. Pt denied access to guns or weapons. Pt throughout her stay on [facility] pt felt like his medications were working and  felt comfortable being discharged on these medications. Pt was advised to take  all medications as prescribed, follow up with all scheduled appointments and  abstain from any alcohol or illicit substances. Pt was in agreement. Pt felt  safe and comfortable to be discharge and to follow up with outpt mental health. Pt was very optimistic about his D/C and returning to his home. Pt stated that she   was doing \"good,\" today. Pt stated that she slept \"about 6 hours,\" last night. Pt stated that her appetite is \"good. \"  Pt stated that she rates her depression a  \"0,\" on a scale of 0-10 with 10 being the worst and 0 being none. Pt stated  that he rates his anxiety an \"1/10,\" on the same scale. Pt denies any auditory  or visual hallucinations. Pt denied any thoughts to harm herself or anyone else. Pt felt safe and comfortable for D/C. The Pt was educated primarily by verbal means about her diagnoses and their  manifestations in her life.   The option for treatment including group individual  therapy programming was offered to her and the use of medications with all their  potential risks, benefits, and side-effects were discussed with the pt at  length. Pt was given the opportunity to ask questions and she participated in the  treatment and planning process. Pt felt ready and eager to be discharged from  the from the [facility] unit. Pt felt she was safe for this disposition. Complications: none      Treatment options, medications and alternatives reviewed with Celia Gonzalez and they agree with the plan to discharge. Discharge on regular diet, continue activity as tolerated. Patient appears to be in stable condition and close to their baseline functioning. The patient denies suicidal or homicidal ideations and is showing future orientation. Patient no longer presented an imminent risk of danger to themselves and/or others. At the time of discharge it appears that the patient has received the maximum medical benefit from this hospitalization and can be appropriately managed with community treatment.            Medication List      ASK your doctor about these medications    amLODIPine 5 MG tablet  Commonly known as: NORVASC     fenofibrate 160 MG tablet  Commonly known as: TRIGLIDE     levothyroxine 25 MCG tablet  Commonly known as: SYNTHROID     losartan 100 MG tablet  Commonly known as: COZAAR     oxybutynin 5 MG tablet  Commonly known as: DITROPAN     potassium chloride 10 MEQ extended release tablet  Commonly known as: KLOR-CON     simvastatin 40 MG tablet  Commonly known as: ZOCOR     venlafaxine 75 MG extended release capsule  Commonly known as: EFFEXOR XR     vitamin D3 125 MCG (5000 UT) Tabs tablet  Commonly known as: CHOLECALCIFEROL            Social History     Socioeconomic History    Marital status: Single     Spouse name: Not on file    Number of children: Not on file    Years of education: Not on file    Highest education level: Not on file   Occupational History    Not on file   Tobacco Use    Smoking status: Never Smoker    Smokeless tobacco: Never Used   Vaping Use    Vaping Use: Never used   Substance and Sexual Activity    Alcohol use: Not Currently    Drug use: Never    Sexual activity: Not Currently   Other Topics Concern    Not on file   Social History Narrative    Not on file     Social Determinants of Health     Financial Resource Strain:     Difficulty of Paying Living Expenses:    Food Insecurity:     Worried About Running Out of Food in the Last Year:     [address] in the Last Year:    Transportation Needs:     Lack of Transportation (Medical):  Lack of Transportation (Non-Medical):    Physical Activity:     Days of Exercise per Week:     Minutes of Exercise per Session:    Stress:     Feeling of Stress :    Social Connections:     Frequency of Communication with Friends and Family:     Frequency of Social Gatherings with Friends and Family:     Attends Worship Services:     Active Member of Clubs or Organizations:     Attends Club or Organization Meetings:     Marital Status:    Intimate Partner Violence:     Fear of Current or Ex-Partner:     Emotionally Abused:     Physically Abused:     Sexually Abused:        Past Medical History:   Diagnosis Date    Essential hypertension     Hyperlipidemia       History reviewed. No pertinent surgical history. Social History     Tobacco Use    Smoking status: Never Smoker    Smokeless tobacco: Never Used   Substance Use Topics    Alcohol use: Not Currently      History reviewed. No pertinent family history.      Medications Prior to Admission: amLODIPine (NORVASC) 5 MG tablet, Take 5 mg by mouth daily  vitamin D3 (CHOLECALCIFEROL) 125 MCG (5000 UT) TABS tablet, Take 50,000 Units by mouth daily  fenofibrate (TRIGLIDE) 160 MG tablet, Take 160 mg by mouth daily  levothyroxine (SYNTHROID) 25 MCG tablet, Take 25 mcg by mouth daily  losartan (COZAAR) 100 MG tablet, Take 100 mg by mouth daily  oxybutynin (DITROPAN) 5 MG tablet, Take 5 mg by mouth daily  potassium chloride (KLOR-CON) 10 MEQ extended release tablet, Take 10 mEq by mouth daily  simvastatin (ZOCOR) 40 MG tablet, Take 40 mg by mouth daily  venlafaxine (EFFEXOR XR) 75 MG extended release capsule, Take 75 mg by mouth daily  Allergies   Allergen Reactions    Penicillins Hives    Sulfa Antibiotics Other (See Comments)     Reaction unknown        he patient verbalized understanding and agreement with the above information  LEGAL STATUS ON DISCHARGE:  Voluntary  DISCHARGE DISPOSITION:  D/C pt to [facility]  DISCHARGE CONDITION:  Stable for outpatient treatment  DISCHARGE DIET:  Resume previous home diet    ACTI VITES:  As tolerated    FOLLOWUP APPOINTMENT(S):  Apt scheduled  CONSULTS:  [employer] POA was offered and reviewed with pt:      Talked to pts poa,went over patients benefits and other matters. Reveiwed financial options /programs ect. .. CORE MEASURES  -Was the patient discharged on two or more Antipsychotics? No       -Was Hemoglobin A1C or fasting Glucose measure done within the last 12 months? yes  -Lipid Panel measure done within the last 12 months? yes  -Pt was offered an FDA approved medication for Chemical Dependency: yes  -Pt was offered for discharged on tobacco/nicotine cessation and/or codependency cessation via medication assistance: yes    More than 30 mins was spent face to face with the patient to discuss the diagnosis, treatment recommendations, and prognosis. Safety planning was also reviewed. The patient agreed to go to the nearest ER or call 911 if they experienced an emergency        The patient was admitted to the psychiatric unit and monitored for stabilization. A multidisciplinary team met with the patient on a daily basis. The diagnosis, treatment recommendations, and prognosis were reviewed with the patient.       Objective:  Vital signs in last 24 hours:  Vitals:    08/11/21 0815   BP:    Pulse: 67   Resp: 12   Temp: 97.5 °F (36.4 °C)   SpO2: 96%       Labs:      Recent Results (from the past 504 hour(s))   TSH without Reflex    Collection Time: 07/23/21 10:30 PM   Result Value Ref Range    TSH, High Sensitivity 0.910 0.270 - 4.20 uIu/ml   T4, free    Collection Time: 07/23/21 10:30 PM   Result Value Ref Range    T4 Free 1.20 0.9 - 1.8 NG/DL   EKG 12 lead    Collection Time: 07/24/21  5:14 AM   Result Value Ref Range    Ventricular Rate 65 BPM    Atrial Rate 65 BPM    P-R Interval 178 ms    QRS Duration 84 ms    Q-T Interval 452 ms    QTc Calculation (Bazett) 470 ms    P Axis 68 degrees    R Axis 23 degrees    T Axis 79 degrees    Diagnosis       Normal sinus rhythm  Normal ECG  No previous ECGs available  Confirmed by [facility] CAROLA ZHAO ([id number]) on 7/25/2021 8:43:00 PM     Comprehensive Metabolic Panel w/ Reflex to MG    Collection Time: 07/24/21  5:50 AM   Result Value Ref Range    Sodium 139 135 - 145 MMOL/L    Potassium 3.9 3.5 - 5.1 MMOL/L    Chloride 101 99 - 110 mMol/L    CO2 30 21 - 32 MMOL/L    BUN 15 6 - 23 MG/DL    CREATININE 0.8 0.6 - 1.1 MG/DL    Glucose 79 70 - 99 MG/DL    Calcium 9.6 8.3 - 10.6 MG/DL    Albumin 3.3 (L) 3.4 - 5.0 GM/DL    Total Protein 6.6 6.4 - 8.2 GM/DL    Total Bilirubin 0.8 0.0 - 1.0 MG/DL    ALT 11 10 - 40 U/L    AST 27 15 - 37 IU/L    Alkaline Phosphatase 65 40 - 129 IU/L    GFR Non-African American >60 >60 mL/min/1.73m2    GFR African American >60 >60 mL/min/1.73m2    Anion Gap 8 4 - 16   CBC auto differential    Collection Time: 07/24/21  5:50 AM   Result Value Ref Range    WBC 6.4 4.0 - 10.5 K/CU MM    RBC 4.89 4.2 - 5.4 M/CU MM    Hemoglobin 14.2 12.5 - 16.0 GM/DL    Hematocrit 45.6 37 - 47 %    MCV 93.3 78 - 100 FL    MCH 29.0 27 - 31 PG    MCHC 31.1 (L) 32.0 - 36.0 %    RDW 13.1 11.7 - 14.9 %    Platelets 970 454 - 446 K/CU MM    MPV 12.6 (H) 7.5 - 11.1 FL    Differential Type AUTOMATED DIFFERENTIAL     Segs Relative 69.5 (H) 36 - 66 %    Lymphocytes % 19.5 (L) 24 - 44 %    Monocytes % 8.9 (H) 0 - 4 %    Eosinophils % 1.6 0 - 3 % Basophils % 0.3 0 - 1 %    Segs Absolute 4.4 K/CU MM    Lymphocytes Absolute 1.2 K/CU MM    Monocytes Absolute 0.6 K/CU MM    Eosinophils Absolute 0.1 K/CU MM    Basophils Absolute 0.0 K/CU MM    Immature Neutrophil % 0.2 0 - 0.43 %    Total Immature Neutrophil 0.01 K/CU MM   Hemoglobin A1c    Collection Time: 07/24/21  5:50 AM   Result Value Ref Range    Hemoglobin A1C 5.2 4.2 - 6.3 %    eAG 103 mg/dL   RPR Reflex to Titer and TPPA    Collection Time: 07/25/21  5:50 AM   Result Value Ref Range    RPR NON REACTIVE NON REACTIVE   Basic metabolic panel    Collection Time: 07/31/21  6:00 AM   Result Value Ref Range    Sodium 139 135 - 145 MMOL/L    Potassium 4.3 3.5 - 5.1 MMOL/L    Chloride 101 99 - 110 mMol/L    CO2 32 21 - 32 MMOL/L    Anion Gap 6 4 - 16    BUN 28 (H) 6 - 23 MG/DL    CREATININE 1.1 0.6 - 1.1 MG/DL    Glucose 89 70 - 99 MG/DL    Calcium 9.8 8.3 - 10.6 MG/DL    GFR Non- 49 (L) >60 mL/min/1.73m2    GFR  59 (L) >60 mL/min/1.73m2   Vitamin D 25 hydroxy    Collection Time: 07/31/21  6:00 AM   Result Value Ref Range    Vit D, 25-Hydroxy 32.10 >20 NG/ML   Vitamin B12 & folate    Collection Time: 07/31/21  6:00 AM   Result Value Ref Range    Vitamin B-12 664.3 211 - 911 pg/ml    Folate 3.8 3.1 - 17.5 NG/ML   Ammonia    Collection Time: 07/31/21  8:35 AM   Result Value Ref Range    Ammonia 13 11 - 51 UMOL/L   Lipid panel    Collection Time: 08/01/21  5:30 AM   Result Value Ref Range    Triglycerides 171 (H) <150 MG/DL    Cholesterol 145 <200 MG/DL    HDL 35 (L) >40 MG/DL    LDL Direct 80 <100 MG/DL   Comprehensive Metabolic Panel w/ Reflex to MG    Collection Time: 08/07/21  6:00 AM   Result Value Ref Range    Sodium 142 135 - 145 MMOL/L    Potassium 4.4 3.5 - 5.1 MMOL/L    Chloride 106 99 - 110 mMol/L    CO2 25 21 - 32 MMOL/L    BUN 25 (H) 6 - 23 MG/DL    CREATININE 1.0 0.6 - 1.1 MG/DL    Glucose 86 70 - 99 MG/DL    Calcium 9.8 8.3 - 10.6 MG/DL    Albumin 3.4 3.4 - 5.0 GM/DL Total Protein 5.5 (L) 6.4 - 8.2 GM/DL    Total Bilirubin 0.3 0.0 - 1.0 MG/DL    ALT 11 10 - 40 U/L    AST 14 (L) 15 - 37 IU/L    Alkaline Phosphatase 61 40 - 129 IU/L    GFR Non- 55 (L) >60 mL/min/1.73m2    GFR African American >60 >60 mL/min/1.73m2    Anion Gap 11 4 - 16   CBC auto differential    Collection Time: 08/07/21  6:00 AM   Result Value Ref Range    WBC 5.8 4.0 - 10.5 K/CU MM    RBC 4.12 (L) 4.2 - 5.4 M/CU MM    Hemoglobin 11.9 (L) 12.5 - 16.0 GM/DL    Hematocrit 38.5 37 - 47 %    MCV 93.4 78 - 100 FL    MCH 28.9 27 - 31 PG    MCHC 30.9 (L) 32.0 - 36.0 %    RDW 12.6 11.7 - 14.9 %    Platelets 699 262 - 763 K/CU MM    MPV 11.6 (H) 7.5 - 11.1 FL    Differential Type AUTOMATED DIFFERENTIAL     Segs Relative 61.9 36 - 66 %    Lymphocytes % 26.9 24 - 44 %    Monocytes % 8.8 (H) 0 - 4 %    Eosinophils % 1.7 0 - 3 %    Basophils % 0.5 0 - 1 %    Segs Absolute 3.6 K/CU MM    Lymphocytes Absolute 1.6 K/CU MM    Monocytes Absolute 0.5 K/CU MM    Eosinophils Absolute 0.1 K/CU MM    Basophils Absolute 0.0 K/CU MM    Immature Neutrophil % 0.2 0 - 0.43 %    Total Immature Neutrophil 0.01 K/CU MM   Ammonia    Collection Time: 08/08/21  6:00 AM   Result Value Ref Range    Ammonia 28 11 - 51 UMOL/L   Comprehensive Metabolic Panel w/ Reflex to MG    Collection Time: 08/08/21  6:00 AM   Result Value Ref Range    Sodium 139 135 - 145 MMOL/L    Potassium 3.7 3.5 - 5.1 MMOL/L    Chloride 102 99 - 110 mMol/L    CO2 27 21 - 32 MMOL/L    BUN 22 6 - 23 MG/DL    CREATININE 0.9 0.6 - 1.1 MG/DL    Glucose 90 70 - 99 MG/DL    Calcium 10.1 8.3 - 10.6 MG/DL    Albumin 3.9 3.4 - 5.0 GM/DL    Total Protein 7.1 6.4 - 8.2 GM/DL    Total Bilirubin 0.4 0.0 - 1.0 MG/DL    ALT 10 10 - 40 U/L    AST 18 15 - 37 IU/L    Alkaline Phosphatase 66 40 - 129 IU/L    GFR Non-African American >60 >60 mL/min/1.73m2    GFR African American >60 >60 mL/min/1.73m2    Anion Gap 10 4 - 16   Valproic acid level, total    Collection Time: 08/08/21 6:00 AM   Result Value Ref Range    Valproic Acid Lvl 64.6 50 - 100 UG/ML    DOSE AMOUNT DOSE AMT.  GIVEN - UNKNOWN     DOSE TIME DOSE TIME GIVEN - UNKNOWN        40 Minutes

## 2021-08-11 NOTE — PROGRESS NOTES
Pt has been oriented to self, time during the shift. Pt is med compliant and is tolerating meals and fluids. Pt is ambulatory with a front wheeled walker. Pt denies SI/HI/AVH along with depression. Pt states anxiety a 5 on 0-10 scale with 10 severe. Pt has had intermittent crying during the shift, stating she is ready to go home. Emotional support given.

## 2021-08-11 NOTE — GROUP NOTE
Group Therapy Note    Date: 8/11/2021    Group Start Time: 0830  Group End Time: 0900    Number of Participants: 3/4    Type: Morning Goals Group/ Community Meeting    Group Topic/Objective: Set Goal For The Day and to review Unit Rules and Regulations. Patient's Goal:  Pt's speech unintelligible. Notes:  Pt's speech unintelligible at times throughout the group. Pt states she is feeling \"good\" and was unable to state anything she is grateful for. Pt reports restful sleep of ~6 hours. Depression (0-10): 0    Anxiety (0-10): 5    Irritability/Aggitation (0-10): 0    Status After Intervention:  Improved    Participation Level:  Active Listener and Interactive    Participation Quality: Appropriate, Attentive and Sharing    Speech:  pressured    Thought Process/Content: Logical    Affective Functioning: Blunted    Mood: Blunted    Level of consciousness:  Alert and Attentive    Response to Learning: Able to verbalize current knowledge/experience    Endings: None Reported    Modes of Intervention: Education, Support and Socialization    Discipline Responsible: Certified Therapeutic Recreation Specialist     Electronically signed by Kaye Ley MA on 8/11/2021 at 9:14 AM

## 2021-08-11 NOTE — CARE COORDINATION
Patient set for discharge on 8/12/21. [facility] will pick her up @ 11:00 a.m. to transport to [facility]. Faxed prescription for Ingrezza to pharmacy in [place], [address] . Pharmacist will mail meds to sisterAmandeep's home.  is aware and in agreement to making sure they get to facility. Spoke with admissions rep, Emili Quintero, who states she has [facility] access and can print clinicals for coordination of care. Patient set up with a follow up appointment from her stay at [facility] unit on 8/26/21 @ 1:00 pm with Johnna Garsia, KILLIAN @ [facility]. Sister, Amandeep Mendes aware and in agreement.

## 2021-08-11 NOTE — GROUP NOTE
Group Therapy Note    Date: 8/11/2021    Group Start Time: 3266  Group End Time: 2055  Group Topic: Healthy Living/Wellness    [address] [place], [place]        Group Therapy Note    Attendees: 0/4       Notes:  Pt sleeping and allowed to continue to rest.       Discipline Responsible: Certified Therapeutic Recreation Specialist       Signature:  Claude Dates, [place]

## 2021-08-11 NOTE — PROGRESS NOTES
Behavioral Services                                              Medicare Re-Certification    I certify that the inpatient psychiatric hospital services furnished since the previous certification/re-certification were, and continue to be, medically necessary for;    [x] (1) Treatment which could reasonably be expected to improve the patient's condition,    [x] (2) Or for diagnostic study. Estimated length of stay/service 7 days    Plan for post-hospital care out pt mental health    This patient continues to need, on a daily basis, active treatment furnished directly by or requiring the supervision of inpatient psychiatric personnel.     Electronically signed by Shantelle Brothers DO on 8/11/2021 at 11:10 AM

## 2021-08-11 NOTE — DISCHARGE INSTR - COC
Continuity of Care Form    Patient Name: Doreen Rust   :  1950  MRN:  [id number]    Admit date:  2021  Discharge date:  ***    Code Status Order: Full Code   Advance Directives:      Admitting Physician:  David Pollack DO  PCP: Adelina Rios    Discharging Nurse: [facility] Unit/Room#: 3150/8517-90  Discharging Unit Phone Number: ***    Emergency Contact:   Extended Emergency Contact Information  Primary Emergency Contact: [facility] [address] [facility] Phone: [phone]  Mobile Phone: [phone]  Relation: Brother/Sister  Preferred language: English   needed? No  Secondary Emergency Contact: [facility] Phone: [phone]  Relation: Brother/Sister    Past Surgical History:  History reviewed. No pertinent surgical history. Immunization History: There is no immunization history on file for this patient.     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 ([facility] [address] G30.9, F02.81    Hypothyroidism due to acquired atrophy of thyroid E03.4    Essential hypertension I10    Overactive bladder N32.81    Hypokalemia E87.6    Other hyperlipidemia E78.49       Isolation/Infection:   Isolation            No Isolation          Patient Infection Status       None to display            Nurse Assessment:  Last Vital Signs: BP (!) 141/71   Pulse 67   Temp 97.5 °F (36.4 °C) (Temporal)   Resp 12   Ht 5' 2\" (1.575 m)   Wt 142 lb (64.4 kg)   SpO2 96%   BMI 25.97 kg/m²     Last documented pain score (0-10 scale): Pain Level: 0  Last Weight:   Wt Readings from Last 1 Encounters:   21 142 lb (64.4 kg)     Mental Status:  {IP PT MENTAL STATUS::::0}    IV Access:  {MH ROBERT IV ACCESS:535803473:::0}    Nursing Mobility/ADLs:  Walking   {CHP DME ADLs:438469822:::0}  Transfer  {CHP DME ADLs:317713147:::0}  Bathing  {CHP DME ADLs:002591862:::0}  Dressing  {CHP DME ADLs:980461613:::0}  Toileting  {CHP DME ADLs:453850562:::0}  Feeding  {CHP DME ADLs:481736475:::0}  Med Admin  {CHP DME ADLs:002052658:::0}  Med Delivery   { ROBERT MED Delivery:417729311:::0}    Wound Care Documentation and Therapy:        Elimination:  Continence: Bowel: {YES / US:}  Bladder: {YES / UE:42698}  Urinary Catheter: {Urinary Catheter:723068243:::0}   Colostomy/Ileostomy/Ileal Conduit: {YES / ER:02543}       Date of Last BM: ***    Intake/Output Summary (Last 24 hours) at 2021 1124  Last data filed at 2021 0910  Gross per 24 hour   Intake 240 ml   Output --   Net 240 ml     No intake/output data recorded.     Safety Concerns:     [facility] Safety Concerns:410546583:::0}    Impairments/Disabilities:      [facility] Impairments/Disabilities:507319383:::0}    Nutrition Therapy:  Current Nutrition Therapy:   [facility] Diet List:800738697:::0}    Routes of Feeding: {CHP DME Other Feedings:080467267:::0}  Liquids: {Slp liquid thickness:01760}  Daily Fluid Restriction: {CHP DME Yes amt example:317562346:::0}  Last Modified Barium Swallow with Video (Video Swallowing Test): {Done Not Done TKPA:543371883:::7}    Treatments at the Time of Hospital Discharge:   Respiratory Treatments: ***  Oxygen Therapy:  {Therapy; copd oxygen:07346:::0}  Ventilator:    { CC Vent List:437040691:::0}    Rehab Therapies: {THERAPEUTIC INTERVENTION:0377964755}  Weight Bearing Status/Restrictions: [employer] Weight Bearin:::0}  Other Medical Equipment (for information only, NOT a DME order):  {EQUIPMENT:634091138}  Other Treatments: ***    Patient's personal belongings (please select all that are sent with patient):  {CHP DME Belongings:271885253:::0}    RN SIGNATURE:  {Esignature:281119595:::0}    CASE MANAGEMENT/SOCIAL WORK SECTION    Inpatient Status Date: ***    Readmission Risk Assessment Score:  Readmission Risk              Risk of Unplanned Readmission:  14           Discharging to Facility/ Agency   Name: Address:  Phone:  Fax:    Dialysis Facility (if applicable)   Name:  Address:  Dialysis Schedule:  Phone:  Fax:    / signature: {Esignature:131091543:::0}    PHYSICIAN SECTION    Prognosis: Good    Condition at Discharge: Stable    Rehab Potential (if transferring to Rehab): Good    Recommended Labs or Other Treatments After Discharge: Leana Ou for severe Tardive D. Physician Certification: I certify the above information and transfer of Halima Mena  is necessary for the continuing treatment of the diagnosis listed and that she requires [facility] for greater 30 days.      Update Admission H&P: Changes in H&P as follows - pt stabilized on current medications    PHYSICIAN SIGNATURE:  Electronically signed by Asif Guadalupe DO on 8/11/21 at 11:25 AM EDT

## 2021-08-12 VITALS
RESPIRATION RATE: 18 BRPM | TEMPERATURE: 97.5 F | WEIGHT: 142 LBS | BODY MASS INDEX: 26.13 KG/M2 | SYSTOLIC BLOOD PRESSURE: 112 MMHG | DIASTOLIC BLOOD PRESSURE: 63 MMHG | HEIGHT: 62 IN | HEART RATE: 56 BPM | OXYGEN SATURATION: 95 %

## 2021-08-12 PROCEDURE — 6370000000 HC RX 637 (ALT 250 FOR IP): Performed by: PSYCHIATRY & NEUROLOGY

## 2021-08-12 PROCEDURE — 6370000000 HC RX 637 (ALT 250 FOR IP): Performed by: PHYSICIAN ASSISTANT

## 2021-08-12 RX ADMIN — LOSARTAN POTASSIUM 100 MG: 100 TABLET, FILM COATED ORAL at 09:17

## 2021-08-12 RX ADMIN — OXYBUTYNIN CHLORIDE 5 MG: 5 TABLET, EXTENDED RELEASE ORAL at 09:16

## 2021-08-12 RX ADMIN — POTASSIUM CHLORIDE 10 MEQ: 10 TABLET, EXTENDED RELEASE ORAL at 09:16

## 2021-08-12 RX ADMIN — ATORVASTATIN CALCIUM 20 MG: 20 TABLET, FILM COATED ORAL at 09:15

## 2021-08-12 RX ADMIN — AMLODIPINE BESYLATE 5 MG: 5 TABLET ORAL at 09:16

## 2021-08-12 RX ADMIN — CHOLECALCIFEROL TAB 125 MCG (5000 UNIT) 5000 UNITS: 125 TAB at 09:15

## 2021-08-12 RX ADMIN — LEVOTHYROXINE SODIUM 25 MCG: 25 TABLET ORAL at 07:38

## 2021-08-12 RX ADMIN — NYSTATIN 500000 UNITS: 100000 SUSPENSION ORAL at 09:15

## 2021-08-12 RX ADMIN — BENZTROPINE MESYLATE 1 MG: 1 TABLET ORAL at 09:17

## 2021-08-12 RX ADMIN — DIVALPROEX SODIUM 250 MG: 125 CAPSULE ORAL at 09:16

## 2021-08-12 ASSESSMENT — PAIN SCALES - GENERAL: PAINLEVEL_OUTOF10: 0

## 2021-08-12 NOTE — PROGRESS NOTES
Pt was watching TV this evening, keeping to herself and eating snacks. She is med compliant. She went to Bed around 11:30pm and has been sleeping throughout the night. She had a couple of episodes of crying, but is easily re-directable and calmed down. Denies SI/HI/SVH. She wants to go home.

## 2021-08-12 NOTE — GROUP NOTE
Group Therapy Note    Date: 8/12/2021    Group Start Time: 1000  Group End Time: 2057     Number of Participants: 2/5    Type: Spirituality    Group Topic/Objective: Religous discussion with [employer]. Notes:  Pt being showered at time of group.      Discipline Responsible: Psycho-    Electronically signed by SWATI Valdes MA on 8/12/2021 at 10:35 AM

## 2021-08-12 NOTE — PROGRESS NOTES
Discharged to [facility] per [place] via stretcher. All documented belongings with patient. Nurse to nurse report called to [facility] at [facility].

## 2021-08-12 NOTE — GROUP NOTE
Group Therapy Note    Date: 8/12/2021    Group Start Time: 0830  Group End Time: 0900  =  Number of Participants: 3/5    Type: Morning Goals Group/ Community Meeting    Group Topic/Objective: Set Goal For The Day and to review Unit Rules and Regulations. Patient's Goal:  Pt's speech unintelligible and therapist unable to understand. Notes:  Pt's speech often unintellibe often during group. Therapist noted increase d/t lethargy. Pt states she is feeling \"sore\" and [facility] \"      Depression (0-10): 0    Anxiety (0-10): 0    Irritability/Aggitation (0-10): 0    Status After Intervention:  Improved    Participation Level: Interactive    Participation Quality: Lethargic    Speech:  pressured    Thought Process/Content: Logical    Affective Functioning: Blunted    Mood: Blunted    Level of consciousness:  Alert and Attentive    Response to Learning: Able to verbalize current knowledge/experience    Endings: None Reported    Modes of Intervention: Education, Support and Socialization    Discipline Responsible: Certified Therapeutic Recreation Specialist     Electronically signed by Saurav Shahid MA on 8/12/2021 at 9:24 AM

## 2021-08-13 ENCOUNTER — TELEPHONE (OUTPATIENT)
Dept: PSYCHIATRY | Age: 71
End: 2021-08-13

## 2021-08-19 ENCOUNTER — TELEPHONE (OUTPATIENT)
Dept: PSYCHIATRY | Age: 71
End: 2021-08-19

## 2023-03-23 NOTE — GROUP NOTE
Group Therapy Note    Date: 7/24/2021    Group Start Time: 4109  Group End Time: 7358    Number of Participants: 4/4    Type: Morning Goals Group/ Community Meeting    Group Topic/Objective: Set Goal For The Day and to review Unit Rules and Regulations. Patient's Goal:  Unintelligible     Notes:  Pt was very vocal during group. Pt's vocalizations were all unintelligible, and pt was making constant, non purposeful movements of her upper extremities and her tongue. Pt was sitting in chair eatting breakfast and was swaying back in forth randomly. Depression (0-10): Unable to report d/t pt's disease progression    Anxiety (0-10): Unable to report d/t pt's disease progression     Irritability/Aggitation (0-10):  Unable to report d/t pt's disease progression    Status After Intervention:  Unchanged    Participation Level: Minimal and Unable to report d/t pt's disease progression; pt vocalized but it was unintelligible    Participation Quality: Unable to report d/t pt's disease progression    Speech:  Unintelligible    Thought Process/Content: Unable to report d/t pt's disease progression    Affective Functioning: Flat    Mood: Flat    Level of consciousness:  Alert    Response to Learning: Unable to report d/t pt's disease progression    Endings: None Reported    Modes of Intervention: Education, Support and Socialization    Discipline Responsible: Music Therapist-Board Certified    Electronically signed by PRETTY Moon on 7/24/2021 at 8:58 AM Propranolol Counseling:  I discussed with the patient the risks of propranolol including but not limited to low heart rate, low blood pressure, low blood sugar, restlessness and increased cold sensitivity. They should call the office if they experience any of these side effects.